# Patient Record
Sex: FEMALE | Race: WHITE | Employment: FULL TIME | URBAN - METROPOLITAN AREA
[De-identification: names, ages, dates, MRNs, and addresses within clinical notes are randomized per-mention and may not be internally consistent; named-entity substitution may affect disease eponyms.]

---

## 2017-01-12 RX ORDER — NEBIVOLOL 10 MG/1
10 TABLET ORAL DAILY
Qty: 90 TABLET | Refills: 1 | Status: SHIPPED | OUTPATIENT
Start: 2017-01-12 | End: 2017-05-10

## 2017-03-21 RX ORDER — AZITHROMYCIN 250 MG/1
TABLET, FILM COATED ORAL
Qty: 1 PACKET | Refills: 0 | Status: SHIPPED | OUTPATIENT
Start: 2017-03-21 | End: 2017-03-29

## 2017-03-24 ENCOUNTER — OFFICE VISIT (OUTPATIENT)
Dept: PRIMARY CARE CLINIC | Age: 62
End: 2017-03-24

## 2017-03-24 VITALS
OXYGEN SATURATION: 99 % | SYSTOLIC BLOOD PRESSURE: 110 MMHG | RESPIRATION RATE: 20 BRPM | TEMPERATURE: 100.4 F | HEIGHT: 65 IN | HEART RATE: 104 BPM | DIASTOLIC BLOOD PRESSURE: 70 MMHG

## 2017-03-24 DIAGNOSIS — M54.50 ACUTE BILATERAL LOW BACK PAIN WITHOUT SCIATICA: ICD-10-CM

## 2017-03-24 DIAGNOSIS — J02.9 SORE THROAT: ICD-10-CM

## 2017-03-24 DIAGNOSIS — R50.81 FEVER IN OTHER DISEASES: Primary | ICD-10-CM

## 2017-03-24 LAB
BILIRUBIN, POC: ABNORMAL
BLOOD URINE, POC: ABNORMAL
CLARITY, POC: CLEAR
COLOR, POC: YELLOW
GLUCOSE URINE, POC: ABNORMAL
INFLUENZA A ANTIBODY: ABNORMAL
INFLUENZA B ANTIBODY: ABNORMAL
KETONES, POC: ABNORMAL
LEUKOCYTE EST, POC: ABNORMAL
NITRITE, POC: ABNORMAL
PH, POC: 6
PROTEIN, POC: ABNORMAL
S PYO AG THROAT QL: NORMAL
SPECIFIC GRAVITY, POC: 1.02
UROBILINOGEN, POC: 3.5

## 2017-03-24 PROCEDURE — 3017F COLORECTAL CA SCREEN DOC REV: CPT | Performed by: FAMILY MEDICINE

## 2017-03-24 PROCEDURE — 87804 INFLUENZA ASSAY W/OPTIC: CPT | Performed by: FAMILY MEDICINE

## 2017-03-24 PROCEDURE — G8484 FLU IMMUNIZE NO ADMIN: HCPCS | Performed by: FAMILY MEDICINE

## 2017-03-24 PROCEDURE — 99213 OFFICE O/P EST LOW 20 MIN: CPT | Performed by: FAMILY MEDICINE

## 2017-03-24 PROCEDURE — 81003 URINALYSIS AUTO W/O SCOPE: CPT | Performed by: FAMILY MEDICINE

## 2017-03-24 PROCEDURE — 87880 STREP A ASSAY W/OPTIC: CPT | Performed by: FAMILY MEDICINE

## 2017-03-24 PROCEDURE — G8427 DOCREV CUR MEDS BY ELIG CLIN: HCPCS | Performed by: FAMILY MEDICINE

## 2017-03-24 PROCEDURE — 3014F SCREEN MAMMO DOC REV: CPT | Performed by: FAMILY MEDICINE

## 2017-03-24 PROCEDURE — G8420 CALC BMI NORM PARAMETERS: HCPCS | Performed by: FAMILY MEDICINE

## 2017-03-24 PROCEDURE — 1036F TOBACCO NON-USER: CPT | Performed by: FAMILY MEDICINE

## 2017-03-24 ASSESSMENT — ENCOUNTER SYMPTOMS
ABDOMINAL PAIN: 1
SHORTNESS OF BREATH: 0
CHEST TIGHTNESS: 1
BACK PAIN: 1
COUGH: 1
SORE THROAT: 1

## 2017-03-26 LAB — URINE CULTURE, ROUTINE: NORMAL

## 2017-03-29 ENCOUNTER — OFFICE VISIT (OUTPATIENT)
Dept: PRIMARY CARE CLINIC | Age: 62
End: 2017-03-29

## 2017-03-29 VITALS
HEART RATE: 59 BPM | SYSTOLIC BLOOD PRESSURE: 116 MMHG | WEIGHT: 119 LBS | HEIGHT: 69 IN | TEMPERATURE: 98.2 F | DIASTOLIC BLOOD PRESSURE: 80 MMHG | BODY MASS INDEX: 17.63 KG/M2 | RESPIRATION RATE: 16 BRPM | OXYGEN SATURATION: 98 %

## 2017-03-29 DIAGNOSIS — J10.1 INFLUENZA B: ICD-10-CM

## 2017-03-29 DIAGNOSIS — R05.9 COUGH: ICD-10-CM

## 2017-03-29 DIAGNOSIS — J40 BRONCHITIS: Primary | ICD-10-CM

## 2017-03-29 PROCEDURE — 96372 THER/PROPH/DIAG INJ SC/IM: CPT | Performed by: FAMILY MEDICINE

## 2017-03-29 PROCEDURE — G8427 DOCREV CUR MEDS BY ELIG CLIN: HCPCS | Performed by: FAMILY MEDICINE

## 2017-03-29 PROCEDURE — 3017F COLORECTAL CA SCREEN DOC REV: CPT | Performed by: FAMILY MEDICINE

## 2017-03-29 PROCEDURE — 3014F SCREEN MAMMO DOC REV: CPT | Performed by: FAMILY MEDICINE

## 2017-03-29 PROCEDURE — 99214 OFFICE O/P EST MOD 30 MIN: CPT | Performed by: FAMILY MEDICINE

## 2017-03-29 PROCEDURE — 1036F TOBACCO NON-USER: CPT | Performed by: FAMILY MEDICINE

## 2017-03-29 PROCEDURE — G8419 CALC BMI OUT NRM PARAM NOF/U: HCPCS | Performed by: FAMILY MEDICINE

## 2017-03-29 PROCEDURE — G8484 FLU IMMUNIZE NO ADMIN: HCPCS | Performed by: FAMILY MEDICINE

## 2017-03-29 RX ORDER — CEFTRIAXONE 1 G/1
1 INJECTION, POWDER, FOR SOLUTION INTRAMUSCULAR; INTRAVENOUS ONCE
Status: COMPLETED | OUTPATIENT
Start: 2017-03-29 | End: 2017-03-29

## 2017-03-29 RX ORDER — MOXIFLOXACIN HYDROCHLORIDE 400 MG/1
400 TABLET ORAL DAILY
Qty: 10 TABLET | Refills: 0 | Status: SHIPPED | OUTPATIENT
Start: 2017-03-29 | End: 2018-03-26 | Stop reason: SDUPTHER

## 2017-03-29 RX ADMIN — CEFTRIAXONE 1 G: 1 INJECTION, POWDER, FOR SOLUTION INTRAMUSCULAR; INTRAVENOUS at 11:40

## 2017-03-29 ASSESSMENT — ENCOUNTER SYMPTOMS
CONSTIPATION: 0
RHINORRHEA: 0
SHORTNESS OF BREATH: 0
WHEEZING: 0
HEMOPTYSIS: 0
HEARTBURN: 0
EYE DISCHARGE: 0
NAUSEA: 0
EYE REDNESS: 0
CHOKING: 0
FACIAL SWELLING: 0
ABDOMINAL PAIN: 0
PHOTOPHOBIA: 0
SORE THROAT: 1
COUGH: 1
STRIDOR: 0
DIARRHEA: 0
APNEA: 0
COLOR CHANGE: 0
CHEST TIGHTNESS: 0
EYE PAIN: 0

## 2017-04-03 ENCOUNTER — NURSE ONLY (OUTPATIENT)
Dept: PRIMARY CARE CLINIC | Age: 62
End: 2017-04-03

## 2017-04-03 DIAGNOSIS — J30.2 SEASONAL ALLERGIC RHINITIS, UNSPECIFIED ALLERGIC RHINITIS TRIGGER: Primary | ICD-10-CM

## 2017-04-03 PROCEDURE — 96372 THER/PROPH/DIAG INJ SC/IM: CPT | Performed by: FAMILY MEDICINE

## 2017-04-03 RX ORDER — TRIAMCINOLONE ACETONIDE 40 MG/ML
80 INJECTION, SUSPENSION INTRA-ARTICULAR; INTRAMUSCULAR ONCE
Status: COMPLETED | OUTPATIENT
Start: 2017-04-03 | End: 2017-04-03

## 2017-04-03 RX ADMIN — TRIAMCINOLONE ACETONIDE 80 MG: 40 INJECTION, SUSPENSION INTRA-ARTICULAR; INTRAMUSCULAR at 13:02

## 2017-04-05 ENCOUNTER — TELEPHONE (OUTPATIENT)
Dept: PRIMARY CARE CLINIC | Age: 62
End: 2017-04-05

## 2017-05-10 ENCOUNTER — OFFICE VISIT (OUTPATIENT)
Dept: PRIMARY CARE CLINIC | Age: 62
End: 2017-05-10

## 2017-05-10 VITALS
DIASTOLIC BLOOD PRESSURE: 80 MMHG | HEIGHT: 64 IN | OXYGEN SATURATION: 99 % | TEMPERATURE: 96.5 F | HEART RATE: 62 BPM | WEIGHT: 124.3 LBS | BODY MASS INDEX: 21.22 KG/M2 | SYSTOLIC BLOOD PRESSURE: 126 MMHG

## 2017-05-10 DIAGNOSIS — I10 ESSENTIAL HYPERTENSION: Primary | ICD-10-CM

## 2017-05-10 DIAGNOSIS — R05.9 COUGH: ICD-10-CM

## 2017-05-10 DIAGNOSIS — Z12.39 BREAST CANCER SCREENING: ICD-10-CM

## 2017-05-10 DIAGNOSIS — K58.9 IRRITABLE BOWEL SYNDROME, UNSPECIFIED TYPE: ICD-10-CM

## 2017-05-10 PROCEDURE — 3014F SCREEN MAMMO DOC REV: CPT | Performed by: FAMILY MEDICINE

## 2017-05-10 PROCEDURE — 1036F TOBACCO NON-USER: CPT | Performed by: FAMILY MEDICINE

## 2017-05-10 PROCEDURE — G8427 DOCREV CUR MEDS BY ELIG CLIN: HCPCS | Performed by: FAMILY MEDICINE

## 2017-05-10 PROCEDURE — 99214 OFFICE O/P EST MOD 30 MIN: CPT | Performed by: FAMILY MEDICINE

## 2017-05-10 PROCEDURE — 3017F COLORECTAL CA SCREEN DOC REV: CPT | Performed by: FAMILY MEDICINE

## 2017-05-10 PROCEDURE — G8420 CALC BMI NORM PARAMETERS: HCPCS | Performed by: FAMILY MEDICINE

## 2017-05-10 RX ORDER — ATENOLOL 25 MG/1
25 TABLET ORAL DAILY
Qty: 90 TABLET | Refills: 1 | Status: SHIPPED | OUTPATIENT
Start: 2017-05-10 | End: 2017-12-04 | Stop reason: SDUPTHER

## 2017-05-10 RX ORDER — PREDNISONE 10 MG/1
TABLET ORAL
Qty: 24 TABLET | Refills: 0 | Status: SHIPPED | OUTPATIENT
Start: 2017-05-10 | End: 2017-05-24

## 2017-05-10 ASSESSMENT — PATIENT HEALTH QUESTIONNAIRE - PHQ9
SUM OF ALL RESPONSES TO PHQ9 QUESTIONS 1 & 2: 0
1. LITTLE INTEREST OR PLEASURE IN DOING THINGS: 0
SUM OF ALL RESPONSES TO PHQ QUESTIONS 1-9: 0
2. FEELING DOWN, DEPRESSED OR HOPELESS: 0

## 2017-05-10 ASSESSMENT — ENCOUNTER SYMPTOMS
NAUSEA: 0
SINUS PRESSURE: 0
ORTHOPNEA: 0
COUGH: 1
ABDOMINAL PAIN: 0
SHORTNESS OF BREATH: 0
SORE THROAT: 0
DIARRHEA: 0
RHINORRHEA: 0
VOMITING: 0
BACK PAIN: 0
WHEEZING: 0
BLURRED VISION: 0
HEMOPTYSIS: 0
HEARTBURN: 0
CONSTIPATION: 0

## 2017-07-07 ENCOUNTER — NURSE ONLY (OUTPATIENT)
Dept: PRIMARY CARE CLINIC | Age: 62
End: 2017-07-07

## 2017-07-07 DIAGNOSIS — J30.9 ALLERGIC RHINITIS, UNSPECIFIED ALLERGIC RHINITIS TRIGGER, UNSPECIFIED RHINITIS SEASONALITY: Primary | ICD-10-CM

## 2017-07-07 PROCEDURE — 96372 THER/PROPH/DIAG INJ SC/IM: CPT | Performed by: FAMILY MEDICINE

## 2017-07-07 RX ORDER — TRIAMCINOLONE ACETONIDE 40 MG/ML
80 INJECTION, SUSPENSION INTRA-ARTICULAR; INTRAMUSCULAR ONCE
Status: COMPLETED | OUTPATIENT
Start: 2017-07-07 | End: 2017-07-07

## 2017-07-07 RX ADMIN — TRIAMCINOLONE ACETONIDE 80 MG: 40 INJECTION, SUSPENSION INTRA-ARTICULAR; INTRAMUSCULAR at 10:18

## 2017-08-17 ENCOUNTER — NURSE ONLY (OUTPATIENT)
Dept: PRIMARY CARE CLINIC | Age: 62
End: 2017-08-17

## 2017-08-17 VITALS — SYSTOLIC BLOOD PRESSURE: 142 MMHG | DIASTOLIC BLOOD PRESSURE: 94 MMHG

## 2017-08-17 DIAGNOSIS — I10 ESSENTIAL HYPERTENSION: Primary | ICD-10-CM

## 2017-09-27 DIAGNOSIS — F41.1 GAD (GENERALIZED ANXIETY DISORDER): ICD-10-CM

## 2017-09-27 RX ORDER — ALPRAZOLAM 0.25 MG/1
0.25 TABLET ORAL 3 TIMES DAILY PRN
Qty: 30 TABLET | Refills: 0 | Status: SHIPPED | OUTPATIENT
Start: 2017-09-27 | End: 2017-11-13

## 2017-10-20 ENCOUNTER — NURSE ONLY (OUTPATIENT)
Dept: PRIMARY CARE CLINIC | Age: 62
End: 2017-10-20

## 2017-10-20 DIAGNOSIS — J30.9 ALLERGIC RHINITIS, UNSPECIFIED CHRONICITY, UNSPECIFIED SEASONALITY, UNSPECIFIED TRIGGER: Primary | ICD-10-CM

## 2017-10-20 PROCEDURE — 96372 THER/PROPH/DIAG INJ SC/IM: CPT | Performed by: FAMILY MEDICINE

## 2017-10-20 RX ORDER — TRIAMCINOLONE ACETONIDE 40 MG/ML
80 INJECTION, SUSPENSION INTRA-ARTICULAR; INTRAMUSCULAR ONCE
Status: COMPLETED | OUTPATIENT
Start: 2017-10-20 | End: 2017-10-20

## 2017-10-20 RX ADMIN — TRIAMCINOLONE ACETONIDE 80 MG: 40 INJECTION, SUSPENSION INTRA-ARTICULAR; INTRAMUSCULAR at 09:03

## 2017-10-26 ENCOUNTER — TELEPHONE (OUTPATIENT)
Dept: PRIMARY CARE CLINIC | Age: 62
End: 2017-10-26

## 2017-10-26 NOTE — TELEPHONE ENCOUNTER
Pt requesting referral for urologist.   Pt has history of kidney stones. She had a CT done and it showed a lot of kidney problems. PATIENT:  [phone]    Please call patient when ready.

## 2017-11-07 ENCOUNTER — OFFICE VISIT (OUTPATIENT)
Dept: PRIMARY CARE CLINIC | Age: 62
End: 2017-11-07

## 2017-11-07 VITALS
HEART RATE: 64 BPM | SYSTOLIC BLOOD PRESSURE: 120 MMHG | BODY MASS INDEX: 21.34 KG/M2 | WEIGHT: 125 LBS | HEIGHT: 64 IN | TEMPERATURE: 97.1 F | RESPIRATION RATE: 16 BRPM | OXYGEN SATURATION: 97 % | DIASTOLIC BLOOD PRESSURE: 84 MMHG

## 2017-11-07 DIAGNOSIS — I10 ESSENTIAL HYPERTENSION: Primary | ICD-10-CM

## 2017-11-07 DIAGNOSIS — J01.00 SUBACUTE MAXILLARY SINUSITIS: ICD-10-CM

## 2017-11-07 PROCEDURE — 3017F COLORECTAL CA SCREEN DOC REV: CPT | Performed by: FAMILY MEDICINE

## 2017-11-07 PROCEDURE — 99213 OFFICE O/P EST LOW 20 MIN: CPT | Performed by: FAMILY MEDICINE

## 2017-11-07 PROCEDURE — 96372 THER/PROPH/DIAG INJ SC/IM: CPT | Performed by: FAMILY MEDICINE

## 2017-11-07 PROCEDURE — G8420 CALC BMI NORM PARAMETERS: HCPCS | Performed by: FAMILY MEDICINE

## 2017-11-07 PROCEDURE — G8484 FLU IMMUNIZE NO ADMIN: HCPCS | Performed by: FAMILY MEDICINE

## 2017-11-07 PROCEDURE — G8427 DOCREV CUR MEDS BY ELIG CLIN: HCPCS | Performed by: FAMILY MEDICINE

## 2017-11-07 PROCEDURE — 1036F TOBACCO NON-USER: CPT | Performed by: FAMILY MEDICINE

## 2017-11-07 PROCEDURE — 3014F SCREEN MAMMO DOC REV: CPT | Performed by: FAMILY MEDICINE

## 2017-11-07 RX ORDER — CEFTRIAXONE 1 G/1
1 INJECTION, POWDER, FOR SOLUTION INTRAMUSCULAR; INTRAVENOUS ONCE
Status: COMPLETED | OUTPATIENT
Start: 2017-11-07 | End: 2017-11-07

## 2017-11-07 RX ORDER — AZITHROMYCIN 250 MG/1
TABLET, FILM COATED ORAL
Qty: 6 TABLET | Refills: 0 | Status: SHIPPED | OUTPATIENT
Start: 2017-11-07 | End: 2018-01-07

## 2017-11-07 RX ADMIN — CEFTRIAXONE 1 G: 1 INJECTION, POWDER, FOR SOLUTION INTRAMUSCULAR; INTRAVENOUS at 16:00

## 2017-11-07 ASSESSMENT — ENCOUNTER SYMPTOMS
WHEEZING: 0
SINUS PRESSURE: 1
VOMITING: 0
SORE THROAT: 1
CONSTIPATION: 0
DIARRHEA: 0
SHORTNESS OF BREATH: 0
COUGH: 1
BACK PAIN: 0
ABDOMINAL PAIN: 0
NAUSEA: 0
SINUS PAIN: 1

## 2017-11-07 NOTE — PROGRESS NOTES
01/10/2017    DR. FAUSTIN    HYSTERECTOMY, TOTAL ABDOMINAL  10/08    [facility]    THUMB AMPUTATION  11/2007    joint replacement    TOE FUSION  4/2002    TOTAL HIP ARTHROPLASTY Left 10/6/2016    HIP TOTAL ARTHROPLASTY, LEFT, YOUNG MDM, ACCOLADE 2, TXA-IV  performed by Kinsey Massey MD at [facility] OR     Family History   Problem Relation Age of Onset    Coronary Art Dis Father      Social History     Social History    Marital status:      Spouse name: N/A    Number of children: N/A    Years of education: N/A     Occupational History    Not on file. Social History Main Topics    Smoking status: Former Smoker     Quit date: 1/1/1996    Smokeless tobacco: Never Used    Alcohol use Yes      Comment: rare    Drug use: No    Sexual activity: Not on file     Other Topics Concern    Not on file     Social History Narrative    No narrative on file     Allergies:  Levaquin [levofloxacin in d5w]    Review of Systems   Constitutional: Negative for chills, fatigue and fever. HENT: Positive for congestion, postnasal drip, sinus pain, sinus pressure and sore throat. Negative for ear pain. Respiratory: Positive for cough. Negative for shortness of breath and wheezing. Cardiovascular: Negative for chest pain and palpitations. Gastrointestinal: Negative for abdominal pain, constipation, diarrhea, nausea and vomiting. Musculoskeletal: Negative for back pain and neck pain. Neurological: Positive for headaches. Negative for dizziness. Objective:   /84 (Site: Right Arm, Position: Sitting, Cuff Size: Medium Adult)   Pulse 64   Temp 97.1 °F (36.2 °C) (Tympanic)   Resp 16   Ht 5' 4\" (1.626 m)   Wt 125 lb (56.7 kg)   SpO2 97%   Breastfeeding? No   BMI 21.46 kg/m²     Physical Exam   Constitutional: She is oriented to person, place, and time. She appears well-developed and well-nourished. HENT:   Head: Normocephalic and atraumatic.    Nose: Mucosal edema, rhinorrhea, nose

## 2017-11-13 ENCOUNTER — TELEPHONE (OUTPATIENT)
Dept: PRIMARY CARE CLINIC | Age: 62
End: 2017-11-13

## 2017-11-13 RX ORDER — GUAIFENESIN 600 MG/1
600 TABLET, EXTENDED RELEASE ORAL 2 TIMES DAILY
Qty: 60 TABLET | Refills: 0 | Status: SHIPPED | OUTPATIENT
Start: 2017-11-13 | End: 2018-04-12

## 2017-11-13 RX ORDER — ALPRAZOLAM 0.5 MG/1
0.5 TABLET ORAL 3 TIMES DAILY
Qty: 90 TABLET | Refills: 0 | Status: SHIPPED | OUTPATIENT
Start: 2017-11-13 | End: 2017-12-13

## 2017-11-13 RX ORDER — FEXOFENADINE HCL 180 MG/1
180 TABLET ORAL DAILY
Qty: 30 TABLET | Refills: 0 | Status: SHIPPED | OUTPATIENT
Start: 2017-11-13 | End: 2018-03-16

## 2017-12-04 DIAGNOSIS — I10 ESSENTIAL HYPERTENSION: ICD-10-CM

## 2017-12-04 RX ORDER — ATENOLOL 25 MG/1
TABLET ORAL
Qty: 90 TABLET | Refills: 1 | Status: SHIPPED | OUTPATIENT
Start: 2017-12-04 | End: 2018-05-29 | Stop reason: SDUPTHER

## 2018-01-24 ENCOUNTER — NURSE ONLY (OUTPATIENT)
Dept: PRIMARY CARE CLINIC | Age: 63
End: 2018-01-24
Payer: COMMERCIAL

## 2018-01-24 DIAGNOSIS — J30.2 CHRONIC SEASONAL ALLERGIC RHINITIS, UNSPECIFIED TRIGGER: Primary | ICD-10-CM

## 2018-01-24 PROCEDURE — 96372 THER/PROPH/DIAG INJ SC/IM: CPT | Performed by: FAMILY MEDICINE

## 2018-01-24 RX ORDER — TRIAMCINOLONE ACETONIDE 40 MG/ML
80 INJECTION, SUSPENSION INTRA-ARTICULAR; INTRAMUSCULAR ONCE
Status: COMPLETED | OUTPATIENT
Start: 2018-01-24 | End: 2018-01-24

## 2018-01-24 RX ADMIN — TRIAMCINOLONE ACETONIDE 80 MG: 40 INJECTION, SUSPENSION INTRA-ARTICULAR; INTRAMUSCULAR at 10:40

## 2018-01-26 ENCOUNTER — TELEPHONE (OUTPATIENT)
Dept: PRIMARY CARE CLINIC | Age: 63
End: 2018-01-26

## 2018-01-26 RX ORDER — NEBIVOLOL 10 MG/1
TABLET ORAL
Qty: 90 TABLET | Refills: 1 | Status: SHIPPED | OUTPATIENT
Start: 2018-01-26 | End: 2018-02-26

## 2018-01-30 ENCOUNTER — TELEPHONE (OUTPATIENT)
Dept: PRIMARY CARE CLINIC | Age: 63
End: 2018-01-30

## 2018-02-06 RX ORDER — ALPRAZOLAM 0.5 MG/1
0.5 TABLET ORAL 3 TIMES DAILY PRN
Qty: 90 TABLET | Refills: 0 | Status: SHIPPED | OUTPATIENT
Start: 2018-02-06 | End: 2018-03-08

## 2018-02-21 ENCOUNTER — OFFICE VISIT (OUTPATIENT)
Dept: PRIMARY CARE CLINIC | Age: 63
End: 2018-02-21
Payer: COMMERCIAL

## 2018-02-21 VITALS
HEIGHT: 64 IN | TEMPERATURE: 96.7 F | BODY MASS INDEX: 20.83 KG/M2 | RESPIRATION RATE: 16 BRPM | WEIGHT: 122 LBS | DIASTOLIC BLOOD PRESSURE: 82 MMHG | OXYGEN SATURATION: 98 % | SYSTOLIC BLOOD PRESSURE: 126 MMHG | HEART RATE: 61 BPM

## 2018-02-21 DIAGNOSIS — J34.89 SINUS PRESSURE: ICD-10-CM

## 2018-02-21 DIAGNOSIS — H61.23 BILATERAL IMPACTED CERUMEN: Primary | ICD-10-CM

## 2018-02-21 PROCEDURE — 99213 OFFICE O/P EST LOW 20 MIN: CPT | Performed by: NURSE PRACTITIONER

## 2018-02-21 PROCEDURE — G8420 CALC BMI NORM PARAMETERS: HCPCS | Performed by: NURSE PRACTITIONER

## 2018-02-21 PROCEDURE — 69210 REMOVE IMPACTED EAR WAX UNI: CPT | Performed by: NURSE PRACTITIONER

## 2018-02-21 PROCEDURE — G8427 DOCREV CUR MEDS BY ELIG CLIN: HCPCS | Performed by: NURSE PRACTITIONER

## 2018-02-21 PROCEDURE — G8484 FLU IMMUNIZE NO ADMIN: HCPCS | Performed by: NURSE PRACTITIONER

## 2018-02-21 PROCEDURE — 3017F COLORECTAL CA SCREEN DOC REV: CPT | Performed by: NURSE PRACTITIONER

## 2018-02-21 PROCEDURE — 1036F TOBACCO NON-USER: CPT | Performed by: NURSE PRACTITIONER

## 2018-02-21 PROCEDURE — 3014F SCREEN MAMMO DOC REV: CPT | Performed by: NURSE PRACTITIONER

## 2018-02-21 RX ORDER — CEFDINIR 300 MG/1
600 CAPSULE ORAL DAILY
Qty: 20 CAPSULE | Refills: 0 | Status: SHIPPED | OUTPATIENT
Start: 2018-02-21 | End: 2018-03-16

## 2018-02-21 ASSESSMENT — ENCOUNTER SYMPTOMS
SORE THROAT: 0
SINUS PRESSURE: 1
SINUS PAIN: 1
COUGH: 1
SHORTNESS OF BREATH: 0
WHEEZING: 0
RHINORRHEA: 1

## 2018-02-21 NOTE — PROGRESS NOTES
Subjective:      Patient ID: Haylie Agustin is a 58 y.o. female who presents today for:  Chief Complaint   Patient presents with    Sinusitis     pt has c.o sinus pressure, drianage, headache, cough and congestion. Sinusitis   This is a new problem. The current episode started in the past 7 days. The problem is unchanged. There has been no fever. Associated symptoms include congestion, coughing, ear pain (on left), headaches and sinus pressure (on left). Pertinent negatives include no chills, shortness of breath or sore throat. Past treatments include nothing. Past Medical History:   Diagnosis Date    Allergic Conjunctivitis 5/15/2013    Allergic rhinitis 5/15/2013    Anxiety 10/12/2015    Anxiety, not on Paxil, Lexapro 1/12/2016    Breast cyst 6/2006    aspirated    Carotid stenosis 8/2008    Cervical arthritis ([address] 2/20/2014    Chronic sinusitis 5/15/2013    Colitis     collagenus    Decreased libido 2/20/2014    Depression 1/5/2016    Family discord,  1/5/2016    Family discord,  10/12/2015    Fatigue 2/20/2014    ADILENE (generalized anxiety disorder)     HA (headache) 8/6/2015    Health maintenance examination 4/21/2015    HTN (hypertension)     HTN (hypertension), controlled w/ wt loss 6/9/2014    IBS (irritable bowel syndrome)     Infiltrate noted on imaging study 5/2/2016    Neck pain on left side 2/20/2014    Nocturnal headaches 8/6/2015    Primary insomnia 1/5/2016    Rosacea     S/P hysterectomy 4/21/2015    S/P total hysterectomy 2/20/2014    Sinusitis 8/6/2015    Social isolation 1/5/2016    Stress, daughter 8/6/2015    Uterine fibroid 10/2008     Past Surgical History:   Procedure Laterality Date    BACK SURGERY      BREAST BIOPSY  10/2009    COLONOSCOPY  01/10/2017    DR. FAUSTIN    HYSTERECTOMY, TOTAL ABDOMINAL  10/08    [facility]    THUMB AMPUTATION  11/2007    joint replacement    TOE FUSION  4/2002    TOTAL HIP ARTHROPLASTY Left 10/6/2016 [levofloxacin in d5w]    Review of Systems   Constitutional: Negative for chills, fatigue and fever. HENT: Positive for congestion, ear pain (on left), postnasal drip, rhinorrhea, sinus pain and sinus pressure (on left). Negative for ear discharge and sore throat. Respiratory: Positive for cough. Negative for shortness of breath and wheezing. Musculoskeletal: Negative for myalgias. Neurological: Positive for headaches. Objective:   /82   Pulse 61   Temp 96.7 °F (35.9 °C) (Tympanic)   Resp 16   Ht 5' 4\" (1.626 m)   Wt 122 lb (55.3 kg)   SpO2 98%   BMI 20.94 kg/m²     Physical Exam   Constitutional: She is oriented to person, place, and time. She appears well-developed and well-nourished. HENT:   Head: Normocephalic. Right Ear: External ear normal.   Left Ear: External ear normal.   Ears:    Nose: Rhinorrhea present. Mouth/Throat: Uvula is midline, oropharynx is clear and moist and mucous membranes are normal.   Eyes: Conjunctivae are normal. Right eye exhibits no discharge. Left eye exhibits no discharge. Neck: Normal range of motion. Cardiovascular: Normal rate, regular rhythm and normal heart sounds. Pulmonary/Chest: Effort normal and breath sounds normal. No accessory muscle usage. No respiratory distress. She has no decreased breath sounds. She has no wheezes. She has no rhonchi. She has no rales. Lymphadenopathy:     She has no cervical adenopathy. Neurological: She is alert and oriented to person, place, and time. Skin: Skin is warm and dry. Psychiatric: She has a normal mood and affect. Her behavior is normal.       Assessment:      1. Bilateral impacted cerumen  OR REMOVAL IMPACTED CERUMEN INSTRUMENTATION UNILAT    carbamide peroxide (DEBROX) 6.5 % otic solution   2.  Sinus pressure  cefdinir (OMNICEF) 300 MG capsule       Plan:      Orders Placed This Encounter   Procedures    OR REMOVAL IMPACTED CERUMEN INSTRUMENTATION UNILAT     Ear wax was removed with a

## 2018-02-21 NOTE — PATIENT INSTRUCTIONS
loss of hearing. ? Watch closely for changes in your health, and be sure to contact your doctor if:  ? · You have pain or reduced hearing after 1 week of home treatment. ? · You have any new symptoms, such as nausea or balance problems. Where can you learn more? Go to https://chpepiceweb.[employer]. org and sign in to your [employer] account. Enter S389 in the [employer] box to learn more about \"Earwax Blockage: Care Instructions. \"     If you do not have an account, please click on the \"Sign Up Now\" link. Current as of: March 20, 2017  Content Version: 11.5  © 7697-1793 Healthwise, [employer]. Care instructions adapted under license by [facility] ([facility]). If you have questions about a medical condition or this instruction, always ask your healthcare professional. [address] any warranty or liability for your use of this information.

## 2018-02-26 ENCOUNTER — OFFICE VISIT (OUTPATIENT)
Dept: PRIMARY CARE CLINIC | Age: 63
End: 2018-02-26
Payer: COMMERCIAL

## 2018-02-26 VITALS
RESPIRATION RATE: 16 BRPM | OXYGEN SATURATION: 99 % | DIASTOLIC BLOOD PRESSURE: 88 MMHG | SYSTOLIC BLOOD PRESSURE: 120 MMHG | HEIGHT: 64 IN | BODY MASS INDEX: 20.83 KG/M2 | TEMPERATURE: 97.4 F | HEART RATE: 64 BPM | WEIGHT: 122 LBS

## 2018-02-26 DIAGNOSIS — J30.1 ACUTE SEASONAL ALLERGIC RHINITIS DUE TO POLLEN: ICD-10-CM

## 2018-02-26 DIAGNOSIS — I10 ESSENTIAL HYPERTENSION: ICD-10-CM

## 2018-02-26 DIAGNOSIS — H61.22 HEARING LOSS OF LEFT EAR DUE TO CERUMEN IMPACTION: Primary | ICD-10-CM

## 2018-02-26 PROCEDURE — 1036F TOBACCO NON-USER: CPT | Performed by: FAMILY MEDICINE

## 2018-02-26 PROCEDURE — G8420 CALC BMI NORM PARAMETERS: HCPCS | Performed by: FAMILY MEDICINE

## 2018-02-26 PROCEDURE — 3014F SCREEN MAMMO DOC REV: CPT | Performed by: FAMILY MEDICINE

## 2018-02-26 PROCEDURE — 99213 OFFICE O/P EST LOW 20 MIN: CPT | Performed by: FAMILY MEDICINE

## 2018-02-26 PROCEDURE — G8484 FLU IMMUNIZE NO ADMIN: HCPCS | Performed by: FAMILY MEDICINE

## 2018-02-26 PROCEDURE — 3017F COLORECTAL CA SCREEN DOC REV: CPT | Performed by: FAMILY MEDICINE

## 2018-02-26 PROCEDURE — G8427 DOCREV CUR MEDS BY ELIG CLIN: HCPCS | Performed by: FAMILY MEDICINE

## 2018-02-26 NOTE — PROGRESS NOTES
0.005 % ophthalmic solution Use 1 Drop in both eyes daily at bedtime.  Multiple Vitamins-Minerals (WOMENS ONE DAILY) TABS Take  by mouth. Current Facility-Administered Medications on File Prior to Visit   Medication Dose Route Frequency Provider Last Rate Last Dose    cloNIDine (CATAPRES) tablet 0.1 mg  0.1 mg Oral Once Kriss Lopez CNP           Review of Systems   HENT: Positive for ear discharge, hearing loss and sinus pressure. Negative for ear pain, postnasal drip, rhinorrhea, sinus pain, sore throat, tinnitus and voice change. Respiratory: Negative for chest tightness and shortness of breath. Cardiovascular: Negative for chest pain, palpitations and leg swelling. Objective    Vitals:    02/26/18 1733   BP: 120/88   Site: Left Arm   Position: Sitting   Cuff Size: Small Adult   Pulse: 64   Resp: 16   Temp: 97.4 °F (36.3 °C)   TempSrc: Tympanic   SpO2: 99%   Weight: 122 lb (55.3 kg)   Height: 5' 4\" (1.626 m)     Physical Exam   Constitutional: She is oriented to person, place, and time. She appears well-developed and well-nourished. HENT:   Head: Normocephalic and atraumatic. Right Ear: No foreign bodies. No middle ear effusion. No decreased hearing is noted. Left Ear: A foreign body is present. A middle ear effusion is present. Decreased hearing is noted. Eyes: Conjunctivae and EOM are normal. Pupils are equal, round, and reactive to light. Neck: Normal range of motion. Neck supple. No thyromegaly present. Cardiovascular: Normal rate, regular rhythm and normal heart sounds. No murmur heard. Pulmonary/Chest: Effort normal and breath sounds normal. She has no wheezes. She exhibits no tenderness. Abdominal: Soft. Bowel sounds are normal. There is no tenderness. Musculoskeletal: Normal range of motion. She exhibits no edema or tenderness. Lymphadenopathy:     She has no cervical adenopathy. Neurological: She is alert and oriented to person, place, and time.  She has

## 2018-03-10 ASSESSMENT — ENCOUNTER SYMPTOMS
SINUS PRESSURE: 1
SORE THROAT: 0
CHEST TIGHTNESS: 0
SHORTNESS OF BREATH: 0
SINUS PAIN: 0
VOICE CHANGE: 0
RHINORRHEA: 0

## 2018-03-12 ENCOUNTER — TELEPHONE (OUTPATIENT)
Dept: PRIMARY CARE CLINIC | Age: 63
End: 2018-03-12

## 2018-03-12 RX ORDER — CEFDINIR 300 MG/1
600 CAPSULE ORAL DAILY
Qty: 20 CAPSULE | Refills: 0 | Status: SHIPPED | OUTPATIENT
Start: 2018-03-12 | End: 2018-03-16

## 2018-03-14 ENCOUNTER — TELEPHONE (OUTPATIENT)
Dept: PRIMARY CARE CLINIC | Age: 63
End: 2018-03-14

## 2018-03-16 ENCOUNTER — OFFICE VISIT (OUTPATIENT)
Dept: PRIMARY CARE CLINIC | Age: 63
End: 2018-03-16
Payer: COMMERCIAL

## 2018-03-16 VITALS
OXYGEN SATURATION: 95 % | TEMPERATURE: 96.2 F | HEIGHT: 64 IN | WEIGHT: 120 LBS | HEART RATE: 66 BPM | DIASTOLIC BLOOD PRESSURE: 98 MMHG | BODY MASS INDEX: 20.49 KG/M2 | RESPIRATION RATE: 16 BRPM | SYSTOLIC BLOOD PRESSURE: 172 MMHG

## 2018-03-16 DIAGNOSIS — I10 ESSENTIAL HYPERTENSION: Primary | ICD-10-CM

## 2018-03-16 DIAGNOSIS — F41.1 GAD (GENERALIZED ANXIETY DISORDER): ICD-10-CM

## 2018-03-16 PROCEDURE — 93000 ELECTROCARDIOGRAM COMPLETE: CPT | Performed by: FAMILY MEDICINE

## 2018-03-16 PROCEDURE — 3017F COLORECTAL CA SCREEN DOC REV: CPT | Performed by: FAMILY MEDICINE

## 2018-03-16 PROCEDURE — G8427 DOCREV CUR MEDS BY ELIG CLIN: HCPCS | Performed by: FAMILY MEDICINE

## 2018-03-16 PROCEDURE — 3014F SCREEN MAMMO DOC REV: CPT | Performed by: FAMILY MEDICINE

## 2018-03-16 PROCEDURE — G8420 CALC BMI NORM PARAMETERS: HCPCS | Performed by: FAMILY MEDICINE

## 2018-03-16 PROCEDURE — 99214 OFFICE O/P EST MOD 30 MIN: CPT | Performed by: FAMILY MEDICINE

## 2018-03-16 PROCEDURE — G8484 FLU IMMUNIZE NO ADMIN: HCPCS | Performed by: FAMILY MEDICINE

## 2018-03-16 PROCEDURE — 1036F TOBACCO NON-USER: CPT | Performed by: FAMILY MEDICINE

## 2018-03-16 RX ORDER — ALPRAZOLAM 0.25 MG/1
0.25 TABLET ORAL 3 TIMES DAILY PRN
Qty: 40 TABLET | Refills: 0 | Status: SHIPPED | OUTPATIENT
Start: 2018-03-16 | End: 2018-04-12 | Stop reason: SDUPTHER

## 2018-03-16 RX ORDER — AMLODIPINE BESYLATE 5 MG/1
5 TABLET ORAL DAILY
Qty: 30 TABLET | Refills: 2 | Status: SHIPPED | OUTPATIENT
Start: 2018-03-16 | End: 2018-05-15

## 2018-03-16 ASSESSMENT — ENCOUNTER SYMPTOMS
WHEEZING: 0
BLURRED VISION: 0
STRIDOR: 0
EYE DISCHARGE: 0
ABDOMINAL PAIN: 0
DIARRHEA: 1
EYE PAIN: 0
CONSTIPATION: 0
FACIAL SWELLING: 0
EYE REDNESS: 0
ORTHOPNEA: 0
NAUSEA: 0
SHORTNESS OF BREATH: 1
COLOR CHANGE: 0
PHOTOPHOBIA: 0
CHOKING: 0
APNEA: 0
CHEST TIGHTNESS: 1

## 2018-03-22 ENCOUNTER — TELEPHONE (OUTPATIENT)
Dept: PRIMARY CARE CLINIC | Age: 63
End: 2018-03-22

## 2018-03-22 ENCOUNTER — OFFICE VISIT (OUTPATIENT)
Dept: PRIMARY CARE CLINIC | Age: 63
End: 2018-03-22
Payer: COMMERCIAL

## 2018-03-22 VITALS
HEIGHT: 64 IN | WEIGHT: 120 LBS | RESPIRATION RATE: 16 BRPM | DIASTOLIC BLOOD PRESSURE: 82 MMHG | TEMPERATURE: 97.2 F | OXYGEN SATURATION: 95 % | SYSTOLIC BLOOD PRESSURE: 130 MMHG | BODY MASS INDEX: 20.49 KG/M2 | HEART RATE: 64 BPM

## 2018-03-22 DIAGNOSIS — F41.1 GAD (GENERALIZED ANXIETY DISORDER): ICD-10-CM

## 2018-03-22 DIAGNOSIS — I10 ESSENTIAL HYPERTENSION: Primary | ICD-10-CM

## 2018-03-22 DIAGNOSIS — K58.9 IRRITABLE BOWEL SYNDROME, UNSPECIFIED TYPE: ICD-10-CM

## 2018-03-22 PROCEDURE — 3017F COLORECTAL CA SCREEN DOC REV: CPT | Performed by: FAMILY MEDICINE

## 2018-03-22 PROCEDURE — G8484 FLU IMMUNIZE NO ADMIN: HCPCS | Performed by: FAMILY MEDICINE

## 2018-03-22 PROCEDURE — 3014F SCREEN MAMMO DOC REV: CPT | Performed by: FAMILY MEDICINE

## 2018-03-22 PROCEDURE — 1036F TOBACCO NON-USER: CPT | Performed by: FAMILY MEDICINE

## 2018-03-22 PROCEDURE — G8420 CALC BMI NORM PARAMETERS: HCPCS | Performed by: FAMILY MEDICINE

## 2018-03-22 PROCEDURE — 99213 OFFICE O/P EST LOW 20 MIN: CPT | Performed by: FAMILY MEDICINE

## 2018-03-22 PROCEDURE — G8427 DOCREV CUR MEDS BY ELIG CLIN: HCPCS | Performed by: FAMILY MEDICINE

## 2018-03-22 RX ORDER — LOSARTAN POTASSIUM 100 MG/1
TABLET ORAL
Qty: 30 TABLET | Refills: 2 | Status: SHIPPED | OUTPATIENT
Start: 2018-03-22 | End: 2018-04-12 | Stop reason: SDUPTHER

## 2018-03-22 ASSESSMENT — ENCOUNTER SYMPTOMS
NAUSEA: 0
EYE REDNESS: 0
EYE PAIN: 0
CHEST TIGHTNESS: 0
CONSTIPATION: 0
PHOTOPHOBIA: 0
APNEA: 0
FACIAL SWELLING: 0
ABDOMINAL PAIN: 0
COLOR CHANGE: 0
CHOKING: 0
DIARRHEA: 0
STRIDOR: 0
EYE DISCHARGE: 0
WHEEZING: 0

## 2018-03-22 NOTE — PROGRESS NOTES
documentation, as scribed by Juan Carlos Portillo in my presence, and it is both accurate and complete.  Electronically signed by: Kristin Livingston DO    3/25/18 6:09 PM    Kristin Livingston DO

## 2018-03-26 ENCOUNTER — TELEPHONE (OUTPATIENT)
Dept: PRIMARY CARE CLINIC | Age: 63
End: 2018-03-26

## 2018-03-26 DIAGNOSIS — J40 BRONCHITIS: ICD-10-CM

## 2018-03-26 DIAGNOSIS — R05.9 COUGH: ICD-10-CM

## 2018-03-26 RX ORDER — MOXIFLOXACIN HYDROCHLORIDE 400 MG/1
400 TABLET ORAL DAILY
Qty: 10 TABLET | Refills: 0 | Status: SHIPPED | OUTPATIENT
Start: 2018-03-26 | End: 2018-04-30 | Stop reason: SDUPTHER

## 2018-04-12 ENCOUNTER — OFFICE VISIT (OUTPATIENT)
Dept: PRIMARY CARE CLINIC | Age: 63
End: 2018-04-12
Payer: COMMERCIAL

## 2018-04-12 VITALS
RESPIRATION RATE: 12 BRPM | OXYGEN SATURATION: 99 % | SYSTOLIC BLOOD PRESSURE: 108 MMHG | HEIGHT: 64 IN | HEART RATE: 70 BPM | TEMPERATURE: 97 F | WEIGHT: 119.7 LBS | DIASTOLIC BLOOD PRESSURE: 72 MMHG | BODY MASS INDEX: 20.43 KG/M2

## 2018-04-12 DIAGNOSIS — I10 ESSENTIAL HYPERTENSION: ICD-10-CM

## 2018-04-12 DIAGNOSIS — F41.1 GAD (GENERALIZED ANXIETY DISORDER): ICD-10-CM

## 2018-04-12 PROCEDURE — G8420 CALC BMI NORM PARAMETERS: HCPCS | Performed by: FAMILY MEDICINE

## 2018-04-12 PROCEDURE — 1036F TOBACCO NON-USER: CPT | Performed by: FAMILY MEDICINE

## 2018-04-12 PROCEDURE — 3014F SCREEN MAMMO DOC REV: CPT | Performed by: FAMILY MEDICINE

## 2018-04-12 PROCEDURE — 3017F COLORECTAL CA SCREEN DOC REV: CPT | Performed by: FAMILY MEDICINE

## 2018-04-12 PROCEDURE — 99213 OFFICE O/P EST LOW 20 MIN: CPT | Performed by: FAMILY MEDICINE

## 2018-04-12 PROCEDURE — G8427 DOCREV CUR MEDS BY ELIG CLIN: HCPCS | Performed by: FAMILY MEDICINE

## 2018-04-12 RX ORDER — ALPRAZOLAM 0.25 MG/1
0.25 TABLET ORAL 3 TIMES DAILY PRN
Qty: 60 TABLET | Refills: 0 | Status: SHIPPED | OUTPATIENT
Start: 2018-04-12 | End: 2018-05-15 | Stop reason: SDUPTHER

## 2018-04-12 RX ORDER — AMLODIPINE BESYLATE 5 MG/1
5 TABLET ORAL DAILY
Qty: 30 TABLET | Refills: 0 | Status: CANCELLED | OUTPATIENT
Start: 2018-04-12

## 2018-04-12 RX ORDER — LOSARTAN POTASSIUM 100 MG/1
TABLET ORAL
Qty: 90 TABLET | Refills: 0 | Status: SHIPPED | OUTPATIENT
Start: 2018-04-12 | End: 2018-05-15 | Stop reason: SDUPTHER

## 2018-04-12 RX ORDER — ATENOLOL 25 MG/1
TABLET ORAL
Qty: 90 TABLET | Refills: 1 | Status: CANCELLED | OUTPATIENT
Start: 2018-04-12

## 2018-04-12 RX ORDER — AMLODIPINE BESYLATE 5 MG/1
5 TABLET ORAL DAILY
Qty: 90 TABLET | Refills: 0 | Status: CANCELLED | OUTPATIENT
Start: 2018-04-12

## 2018-04-12 ASSESSMENT — ENCOUNTER SYMPTOMS
PHOTOPHOBIA: 0
WHEEZING: 0
FACIAL SWELLING: 0
EYE PAIN: 0
APNEA: 0
NAUSEA: 0
STRIDOR: 0
DIARRHEA: 0
EYE REDNESS: 0
COLOR CHANGE: 0
ABDOMINAL PAIN: 0
CHEST TIGHTNESS: 0
BLURRED VISION: 0
CONSTIPATION: 0
ORTHOPNEA: 0
EYE DISCHARGE: 0
SHORTNESS OF BREATH: 0
CHOKING: 0

## 2018-04-30 DIAGNOSIS — R05.9 COUGH: ICD-10-CM

## 2018-04-30 DIAGNOSIS — J40 BRONCHITIS: ICD-10-CM

## 2018-04-30 RX ORDER — MOXIFLOXACIN HYDROCHLORIDE 400 MG/1
400 TABLET ORAL DAILY
Qty: 10 TABLET | Refills: 0 | Status: SHIPPED | OUTPATIENT
Start: 2018-04-30 | End: 2018-05-15

## 2018-04-30 RX ORDER — AZELASTINE HYDROCHLORIDE 137 UG/1
1 SPRAY, METERED NASAL 2 TIMES DAILY
Qty: 1 BOTTLE | Refills: 5 | Status: SHIPPED | OUTPATIENT
Start: 2018-04-30 | End: 2019-02-16

## 2018-05-15 ENCOUNTER — OFFICE VISIT (OUTPATIENT)
Dept: PRIMARY CARE CLINIC | Age: 63
End: 2018-05-15
Payer: COMMERCIAL

## 2018-05-15 VITALS
WEIGHT: 121 LBS | DIASTOLIC BLOOD PRESSURE: 86 MMHG | TEMPERATURE: 97 F | BODY MASS INDEX: 20.66 KG/M2 | RESPIRATION RATE: 14 BRPM | HEART RATE: 56 BPM | HEIGHT: 64 IN | OXYGEN SATURATION: 95 % | SYSTOLIC BLOOD PRESSURE: 132 MMHG

## 2018-05-15 DIAGNOSIS — J30.1 ACUTE SEASONAL ALLERGIC RHINITIS DUE TO POLLEN: ICD-10-CM

## 2018-05-15 DIAGNOSIS — I10 ESSENTIAL HYPERTENSION: Primary | ICD-10-CM

## 2018-05-15 DIAGNOSIS — F41.1 GAD (GENERALIZED ANXIETY DISORDER): ICD-10-CM

## 2018-05-15 PROCEDURE — G8427 DOCREV CUR MEDS BY ELIG CLIN: HCPCS | Performed by: FAMILY MEDICINE

## 2018-05-15 PROCEDURE — 1036F TOBACCO NON-USER: CPT | Performed by: FAMILY MEDICINE

## 2018-05-15 PROCEDURE — 3017F COLORECTAL CA SCREEN DOC REV: CPT | Performed by: FAMILY MEDICINE

## 2018-05-15 PROCEDURE — 99214 OFFICE O/P EST MOD 30 MIN: CPT | Performed by: FAMILY MEDICINE

## 2018-05-15 PROCEDURE — G8420 CALC BMI NORM PARAMETERS: HCPCS | Performed by: FAMILY MEDICINE

## 2018-05-15 PROCEDURE — 96372 THER/PROPH/DIAG INJ SC/IM: CPT | Performed by: FAMILY MEDICINE

## 2018-05-15 RX ORDER — ALPRAZOLAM 0.25 MG/1
0.25 TABLET ORAL 3 TIMES DAILY PRN
Qty: 60 TABLET | Refills: 1 | Status: SHIPPED | OUTPATIENT
Start: 2018-05-15 | End: 2018-11-09 | Stop reason: SDUPTHER

## 2018-05-15 RX ORDER — TRIAMCINOLONE ACETONIDE 40 MG/ML
80 INJECTION, SUSPENSION INTRA-ARTICULAR; INTRAMUSCULAR ONCE
Status: COMPLETED | OUTPATIENT
Start: 2018-05-15 | End: 2018-05-15

## 2018-05-15 RX ORDER — LOSARTAN POTASSIUM 100 MG/1
TABLET ORAL
Qty: 90 TABLET | Refills: 1 | Status: SHIPPED | OUTPATIENT
Start: 2018-05-15 | End: 2018-06-26 | Stop reason: SDUPTHER

## 2018-05-15 RX ADMIN — TRIAMCINOLONE ACETONIDE 80 MG: 40 INJECTION, SUSPENSION INTRA-ARTICULAR; INTRAMUSCULAR at 11:15

## 2018-05-15 ASSESSMENT — ENCOUNTER SYMPTOMS
RESPIRATORY NEGATIVE: 1
EYE REDNESS: 0
ABDOMINAL PAIN: 0
PHOTOPHOBIA: 0
WHEEZING: 0
BACK PAIN: 0
SHORTNESS OF BREATH: 0
EYE ITCHING: 0
ORTHOPNEA: 0
BLURRED VISION: 0
CONSTIPATION: 0
DIARRHEA: 0
GASTROINTESTINAL NEGATIVE: 1
EYES NEGATIVE: 1

## 2018-05-15 ASSESSMENT — PATIENT HEALTH QUESTIONNAIRE - PHQ9
SUM OF ALL RESPONSES TO PHQ9 QUESTIONS 1 & 2: 0
2. FEELING DOWN, DEPRESSED OR HOPELESS: 0
1. LITTLE INTEREST OR PLEASURE IN DOING THINGS: 0
SUM OF ALL RESPONSES TO PHQ QUESTIONS 1-9: 0

## 2018-05-29 DIAGNOSIS — I10 ESSENTIAL HYPERTENSION: ICD-10-CM

## 2018-05-30 ENCOUNTER — OFFICE VISIT (OUTPATIENT)
Dept: PRIMARY CARE CLINIC | Age: 63
End: 2018-05-30
Payer: COMMERCIAL

## 2018-05-30 VITALS
WEIGHT: 119.9 LBS | BODY MASS INDEX: 20.47 KG/M2 | HEART RATE: 77 BPM | SYSTOLIC BLOOD PRESSURE: 108 MMHG | OXYGEN SATURATION: 98 % | DIASTOLIC BLOOD PRESSURE: 78 MMHG | HEIGHT: 64 IN | TEMPERATURE: 97.8 F

## 2018-05-30 DIAGNOSIS — T14.8XXA BRUISING: Primary | ICD-10-CM

## 2018-05-30 DIAGNOSIS — I10 ESSENTIAL HYPERTENSION: ICD-10-CM

## 2018-05-30 PROCEDURE — G8420 CALC BMI NORM PARAMETERS: HCPCS | Performed by: FAMILY MEDICINE

## 2018-05-30 PROCEDURE — G8427 DOCREV CUR MEDS BY ELIG CLIN: HCPCS | Performed by: FAMILY MEDICINE

## 2018-05-30 PROCEDURE — 1036F TOBACCO NON-USER: CPT | Performed by: FAMILY MEDICINE

## 2018-05-30 PROCEDURE — 3017F COLORECTAL CA SCREEN DOC REV: CPT | Performed by: FAMILY MEDICINE

## 2018-05-30 PROCEDURE — 99213 OFFICE O/P EST LOW 20 MIN: CPT | Performed by: FAMILY MEDICINE

## 2018-05-30 RX ORDER — ATENOLOL 25 MG/1
25 TABLET ORAL 2 TIMES DAILY
Qty: 180 TABLET | Refills: 1 | Status: SHIPPED | OUTPATIENT
Start: 2018-05-30 | End: 2018-12-12 | Stop reason: SDUPTHER

## 2018-05-30 ASSESSMENT — ENCOUNTER SYMPTOMS
EYE REDNESS: 0
CHEST TIGHTNESS: 0
EYE PAIN: 0
DIARRHEA: 0
STRIDOR: 0
CHOKING: 0
ABDOMINAL PAIN: 0
COLOR CHANGE: 0
PHOTOPHOBIA: 0
FACIAL SWELLING: 0
CONSTIPATION: 0
SHORTNESS OF BREATH: 0
EYE DISCHARGE: 0
APNEA: 0
WHEEZING: 0
NAUSEA: 0

## 2018-05-31 DIAGNOSIS — T14.8XXA BRUISING: ICD-10-CM

## 2018-05-31 LAB
APTT: 25.7 SEC (ref 21.6–35.4)
BASOPHILS ABSOLUTE: 0 K/UL (ref 0–0.2)
BASOPHILS RELATIVE PERCENT: 0.7 %
EOSINOPHILS ABSOLUTE: 0.1 K/UL (ref 0–0.7)
EOSINOPHILS RELATIVE PERCENT: 1.4 %
HCT VFR BLD CALC: 42.2 % (ref 37–47)
HEMOGLOBIN: 14 G/DL (ref 12–16)
INR BLD: 1
LYMPHOCYTES ABSOLUTE: 1.9 K/UL (ref 1–4.8)
LYMPHOCYTES RELATIVE PERCENT: 32 %
MCH RBC QN AUTO: 32.5 PG (ref 27–31.3)
MCHC RBC AUTO-ENTMCNC: 33.1 % (ref 33–37)
MCV RBC AUTO: 98.3 FL (ref 82–100)
MONOCYTES ABSOLUTE: 0.6 K/UL (ref 0.2–0.8)
MONOCYTES RELATIVE PERCENT: 9.3 %
NEUTROPHILS ABSOLUTE: 3.4 K/UL (ref 1.4–6.5)
NEUTROPHILS RELATIVE PERCENT: 56.6 %
PDW BLD-RTO: 13.7 % (ref 11.5–14.5)
PLATELET # BLD: 258 K/UL (ref 130–400)
PROTHROMBIN TIME: 10.6 SEC (ref 9.6–12.3)
RBC # BLD: 4.3 M/UL (ref 4.2–5.4)
WBC # BLD: 6 K/UL (ref 4.8–10.8)

## 2018-06-03 LAB — VON WILLEBRAND AG: 215 % (ref 52–214)

## 2018-06-23 DIAGNOSIS — I10 ESSENTIAL HYPERTENSION: ICD-10-CM

## 2018-06-23 RX ORDER — LOSARTAN POTASSIUM 100 MG/1
TABLET ORAL
Qty: 90 TABLET | Refills: 0 | OUTPATIENT
Start: 2018-06-23

## 2018-06-26 DIAGNOSIS — I10 ESSENTIAL HYPERTENSION: ICD-10-CM

## 2018-06-27 RX ORDER — LOSARTAN POTASSIUM 100 MG/1
TABLET ORAL
Qty: 90 TABLET | Refills: 1 | Status: SHIPPED | OUTPATIENT
Start: 2018-06-27 | End: 2018-12-24 | Stop reason: SDUPTHER

## 2018-10-09 DIAGNOSIS — Z12.39 SCREENING BREAST EXAMINATION: Primary | ICD-10-CM

## 2018-10-13 ENCOUNTER — HOSPITAL ENCOUNTER (OUTPATIENT)
Dept: WOMENS IMAGING | Age: 63
Discharge: HOME | End: 2018-10-15
Payer: COMMERCIAL

## 2018-10-13 DIAGNOSIS — Z12.39 BREAST CANCER SCREENING: ICD-10-CM

## 2018-10-13 PROCEDURE — 77067 SCR MAMMO BI INCL CAD: CPT

## 2018-10-19 ENCOUNTER — OFFICE VISIT (OUTPATIENT)
Dept: PRIMARY CARE CLINIC | Age: 63
End: 2018-10-19
Payer: COMMERCIAL

## 2018-10-19 VITALS
RESPIRATION RATE: 16 BRPM | BODY MASS INDEX: 20.66 KG/M2 | SYSTOLIC BLOOD PRESSURE: 126 MMHG | HEIGHT: 64 IN | TEMPERATURE: 97.7 F | OXYGEN SATURATION: 96 % | WEIGHT: 121 LBS | HEART RATE: 79 BPM | DIASTOLIC BLOOD PRESSURE: 80 MMHG

## 2018-10-19 DIAGNOSIS — F41.1 GAD (GENERALIZED ANXIETY DISORDER): ICD-10-CM

## 2018-10-19 DIAGNOSIS — J01.90 ACUTE SINUSITIS, RECURRENCE NOT SPECIFIED, UNSPECIFIED LOCATION: ICD-10-CM

## 2018-10-19 DIAGNOSIS — K52.9 COLITIS: ICD-10-CM

## 2018-10-19 DIAGNOSIS — K58.9 IRRITABLE BOWEL SYNDROME, UNSPECIFIED TYPE: ICD-10-CM

## 2018-10-19 DIAGNOSIS — J30.1 SEASONAL ALLERGIC RHINITIS DUE TO POLLEN: ICD-10-CM

## 2018-10-19 DIAGNOSIS — I10 ESSENTIAL HYPERTENSION: Primary | ICD-10-CM

## 2018-10-19 PROCEDURE — 3017F COLORECTAL CA SCREEN DOC REV: CPT | Performed by: FAMILY MEDICINE

## 2018-10-19 PROCEDURE — 1036F TOBACCO NON-USER: CPT | Performed by: FAMILY MEDICINE

## 2018-10-19 PROCEDURE — G8420 CALC BMI NORM PARAMETERS: HCPCS | Performed by: FAMILY MEDICINE

## 2018-10-19 PROCEDURE — 96372 THER/PROPH/DIAG INJ SC/IM: CPT | Performed by: FAMILY MEDICINE

## 2018-10-19 PROCEDURE — G8427 DOCREV CUR MEDS BY ELIG CLIN: HCPCS | Performed by: FAMILY MEDICINE

## 2018-10-19 PROCEDURE — G8482 FLU IMMUNIZE ORDER/ADMIN: HCPCS | Performed by: FAMILY MEDICINE

## 2018-10-19 PROCEDURE — 99214 OFFICE O/P EST MOD 30 MIN: CPT | Performed by: FAMILY MEDICINE

## 2018-10-19 RX ORDER — ALPRAZOLAM 0.25 MG/1
TABLET ORAL
Refills: 1 | COMMUNITY
Start: 2018-10-09

## 2018-10-19 RX ORDER — BUDESONIDE 3 MG/1
CAPSULE, COATED PELLETS ORAL
Refills: 0 | COMMUNITY
Start: 2018-09-10 | End: 2018-12-26 | Stop reason: SDUPTHER

## 2018-10-19 RX ORDER — TRIAMCINOLONE ACETONIDE 40 MG/ML
80 INJECTION, SUSPENSION INTRA-ARTICULAR; INTRAMUSCULAR ONCE
Status: COMPLETED | OUTPATIENT
Start: 2018-10-19 | End: 2018-10-19

## 2018-10-19 RX ORDER — AZITHROMYCIN 250 MG/1
TABLET, FILM COATED ORAL
Qty: 6 TABLET | Refills: 0 | Status: SHIPPED | OUTPATIENT
Start: 2018-10-19 | End: 2018-12-10

## 2018-10-19 RX ADMIN — TRIAMCINOLONE ACETONIDE 80 MG: 40 INJECTION, SUSPENSION INTRA-ARTICULAR; INTRAMUSCULAR at 09:39

## 2018-10-19 ASSESSMENT — ENCOUNTER SYMPTOMS
RESPIRATORY NEGATIVE: 1
NAUSEA: 0
BACK PAIN: 0
SWOLLEN GLANDS: 0
SORE THROAT: 0
SINUS PRESSURE: 1
VOMITING: 0
SHORTNESS OF BREATH: 0
DIARRHEA: 0
RHINORRHEA: 1
WHEEZING: 0
HOARSE VOICE: 0
COUGH: 0
CONSTIPATION: 0
ABDOMINAL PAIN: 0
SINUS PAIN: 1

## 2018-10-19 NOTE — PROGRESS NOTES
tenderness present. No epistaxis. Eyes: Pupils are equal, round, and reactive to light. Conjunctivae and EOM are normal.   Neck: Normal range of motion. Neck supple. No thyromegaly present. Cardiovascular: Normal rate, regular rhythm and normal heart sounds. No murmur heard. Pulmonary/Chest: Effort normal and breath sounds normal. She has no wheezes. She exhibits no tenderness. Abdominal: Soft. Bowel sounds are normal. There is no tenderness. Musculoskeletal: Normal range of motion. She exhibits no edema or tenderness. Lymphadenopathy:     She has no cervical adenopathy. Neurological: She is alert and oriented to person, place, and time. She has normal reflexes. Coordination normal.   Skin: Skin is warm and dry. No rash noted. Psychiatric: She has a normal mood and affect. Thought content normal.   Nursing note and vitals reviewed. Assessment:      Diagnosis Orders   1. Essential hypertension     2. ADILENE (generalized anxiety disorder)  ALPRAZolam (XANAX) 0.25 MG tablet   3. Irritable bowel syndrome, unspecified type     4. Seasonal allergic rhinitis due to pollen  triamcinolone acetonide (KENALOG-40) injection 80 mg   5. Acute sinusitis, recurrence not specified, unspecified location  azithromycin (ZITHROMAX Z-LORAINE) 250 MG tablet   6. Colitis  budesonide (ENTOCORT EC) 3 MG extended release capsule       Plan:        Orders Placed This Encounter   Medications    triamcinolone acetonide (KENALOG-40) injection 80 mg    azithromycin (ZITHROMAX Z-LORAINE) 250 MG tablet     Sig: Take 2 pills today then one daily til finished     Dispense:  6 tablet     Refill:  0       Controlled Substances Monitoring:     Return in about 4 months (around 2/19/2019) for Review of Labs & Diagnostic Testing, Routine follow up. Anisa GREEN am scribing for and in the presence of Loren Salas DO. Electronically signed by : Loren Montgomery DO, personally performed the services described in this documentation, as scribed by [employer] Portillo in my presence, and it is both accurate and complete.  Electronically signed by: Jose Turcios DO    10/27/18 7:43 PM    Jose Turcios DO

## 2018-11-09 DIAGNOSIS — F41.1 GAD (GENERALIZED ANXIETY DISORDER): ICD-10-CM

## 2018-11-09 RX ORDER — ALPRAZOLAM 0.25 MG/1
0.25 TABLET ORAL 3 TIMES DAILY PRN
Qty: 60 TABLET | Refills: 1 | Status: SHIPPED | OUTPATIENT
Start: 2018-11-09 | End: 2019-01-16 | Stop reason: SDUPTHER

## 2018-11-09 RX ORDER — ALPRAZOLAM 0.25 MG/1
TABLET ORAL
Refills: 1 | OUTPATIENT
Start: 2018-11-09

## 2018-12-10 ENCOUNTER — OFFICE VISIT (OUTPATIENT)
Dept: PRIMARY CARE CLINIC | Age: 63
End: 2018-12-10
Payer: COMMERCIAL

## 2018-12-10 ENCOUNTER — TELEPHONE (OUTPATIENT)
Dept: PRIMARY CARE CLINIC | Age: 63
End: 2018-12-10

## 2018-12-10 VITALS
HEART RATE: 86 BPM | SYSTOLIC BLOOD PRESSURE: 134 MMHG | RESPIRATION RATE: 16 BRPM | TEMPERATURE: 97.7 F | HEIGHT: 64 IN | DIASTOLIC BLOOD PRESSURE: 86 MMHG | BODY MASS INDEX: 20.66 KG/M2 | WEIGHT: 121 LBS | OXYGEN SATURATION: 98 %

## 2018-12-10 DIAGNOSIS — J01.00 ACUTE NON-RECURRENT MAXILLARY SINUSITIS: Primary | ICD-10-CM

## 2018-12-10 PROCEDURE — 1036F TOBACCO NON-USER: CPT | Performed by: PHYSICIAN ASSISTANT

## 2018-12-10 PROCEDURE — G8420 CALC BMI NORM PARAMETERS: HCPCS | Performed by: PHYSICIAN ASSISTANT

## 2018-12-10 PROCEDURE — 99213 OFFICE O/P EST LOW 20 MIN: CPT | Performed by: PHYSICIAN ASSISTANT

## 2018-12-10 PROCEDURE — 3017F COLORECTAL CA SCREEN DOC REV: CPT | Performed by: PHYSICIAN ASSISTANT

## 2018-12-10 PROCEDURE — G8427 DOCREV CUR MEDS BY ELIG CLIN: HCPCS | Performed by: PHYSICIAN ASSISTANT

## 2018-12-10 PROCEDURE — G8482 FLU IMMUNIZE ORDER/ADMIN: HCPCS | Performed by: PHYSICIAN ASSISTANT

## 2018-12-10 RX ORDER — MOXIFLOXACIN HYDROCHLORIDE 400 MG/1
400 TABLET ORAL DAILY
Qty: 10 TABLET | Refills: 0 | Status: SHIPPED | OUTPATIENT
Start: 2018-12-10 | End: 2018-12-20

## 2018-12-10 RX ORDER — MOXIFLOXACIN HYDROCHLORIDE 400 MG/1
400 TABLET ORAL DAILY
Qty: 10 TABLET | Refills: 0 | Status: SHIPPED | OUTPATIENT
Start: 2018-12-10 | End: 2018-12-12 | Stop reason: SDUPTHER

## 2018-12-10 RX ORDER — FLUCONAZOLE 150 MG/1
150 TABLET ORAL ONCE
Qty: 2 TABLET | Refills: 0 | Status: SHIPPED | OUTPATIENT
Start: 2018-12-10 | End: 2018-12-10

## 2018-12-10 ASSESSMENT — ENCOUNTER SYMPTOMS
SINUS PRESSURE: 1
HOARSE VOICE: 0
COUGH: 1
SORE THROAT: 0

## 2018-12-10 NOTE — PATIENT INSTRUCTIONS
Patient Education        Sinusitis: Care Instructions  Your Care Instructions    Sinusitis is an infection of the lining of the sinus cavities in your head. Sinusitis often follows a cold. It causes pain and pressure in your head and face. In most cases, sinusitis gets better on its own in 1 to 2 weeks. But some mild symptoms may last for several weeks. Sometimes antibiotics are needed. Follow-up care is a key part of your treatment and safety. Be sure to make and go to all appointments, and call your doctor if you are having problems. It's also a good idea to know your test results and keep a list of the medicines you take. How can you care for yourself at home? · Take an over-the-counter pain medicine, such as acetaminophen (Tylenol), ibuprofen (Advil, Motrin), or naproxen (Aleve). Read and follow all instructions on the label. · If the doctor prescribed antibiotics, take them as directed. Do not stop taking them just because you feel better. You need to take the full course of antibiotics. · Be careful when taking over-the-counter cold or flu medicines and Tylenol at the same time. Many of these medicines have acetaminophen, which is Tylenol. Read the labels to make sure that you are not taking more than the recommended dose. Too much acetaminophen (Tylenol) can be harmful. · Breathe warm, moist air from a steamy shower, a hot bath, or a sink filled with hot water. Avoid cold, dry air. Using a humidifier in your home may help. Follow the directions for cleaning the machine. · Use saline (saltwater) nasal washes to help keep your nasal passages open and wash out mucus and bacteria. You can buy saline nose drops at a grocery store or drugstore. Or you can make your own at home by adding 1 teaspoon of salt and 1 teaspoon of baking soda to 2 cups of distilled water. If you make your own, fill a bulb syringe with the solution, insert the tip into your nostril, and squeeze gently. Patricia Locks your nose.   · Put a hot, wet towel or a warm gel pack on your face 3 or 4 times a day for 5 to 10 minutes each time. · Try a decongestant nasal spray like oxymetazoline (Afrin). Do not use it for more than 3 days in a row. Using it for more than 3 days can make your congestion worse. When should you call for help? Call your doctor now or seek immediate medical care if:    · You have new or worse swelling or redness in your face or around your eyes.     · You have a new or higher fever.    Watch closely for changes in your health, and be sure to contact your doctor if:    · You have new or worse facial pain.     · The mucus from your nose becomes thicker (like pus) or has new blood in it.     · You are not getting better as expected. Where can you learn more? Go to https://[email or web address]:USE [employer] Bankpegranteb.[employer]. org and sign in to your [employer] account. Enter W931 in the BTR box to learn more about \"Sinusitis: Care Instructions. \"     If you do not have an account, please click on the \"Sign Up Now\" link. Current as of: March 28, 2018  Content Version: 11.8  © 9291-7479 Healthwise, [employer]. Care instructions adapted under license by [facility] ([facility]). If you have questions about a medical condition or this instruction, always ask your healthcare professional. [address]  any warranty or liability for your use of this information.

## 2018-12-12 ENCOUNTER — OFFICE VISIT (OUTPATIENT)
Dept: PRIMARY CARE CLINIC | Age: 63
End: 2018-12-12
Payer: COMMERCIAL

## 2018-12-12 VITALS
DIASTOLIC BLOOD PRESSURE: 80 MMHG | HEART RATE: 113 BPM | WEIGHT: 117 LBS | RESPIRATION RATE: 16 BRPM | SYSTOLIC BLOOD PRESSURE: 140 MMHG | TEMPERATURE: 97.3 F | HEIGHT: 64 IN | OXYGEN SATURATION: 98 % | BODY MASS INDEX: 19.97 KG/M2

## 2018-12-12 DIAGNOSIS — F41.1 GAD (GENERALIZED ANXIETY DISORDER): ICD-10-CM

## 2018-12-12 DIAGNOSIS — J30.1 SEASONAL ALLERGIC RHINITIS DUE TO POLLEN: ICD-10-CM

## 2018-12-12 DIAGNOSIS — I10 ESSENTIAL HYPERTENSION: Primary | ICD-10-CM

## 2018-12-12 PROCEDURE — 99214 OFFICE O/P EST MOD 30 MIN: CPT | Performed by: FAMILY MEDICINE

## 2018-12-12 PROCEDURE — 93000 ELECTROCARDIOGRAM COMPLETE: CPT | Performed by: FAMILY MEDICINE

## 2018-12-12 PROCEDURE — G8420 CALC BMI NORM PARAMETERS: HCPCS | Performed by: FAMILY MEDICINE

## 2018-12-12 PROCEDURE — 3017F COLORECTAL CA SCREEN DOC REV: CPT | Performed by: FAMILY MEDICINE

## 2018-12-12 PROCEDURE — 1036F TOBACCO NON-USER: CPT | Performed by: FAMILY MEDICINE

## 2018-12-12 PROCEDURE — G8482 FLU IMMUNIZE ORDER/ADMIN: HCPCS | Performed by: FAMILY MEDICINE

## 2018-12-12 PROCEDURE — G8427 DOCREV CUR MEDS BY ELIG CLIN: HCPCS | Performed by: FAMILY MEDICINE

## 2018-12-12 RX ORDER — ATENOLOL 25 MG/1
25 TABLET ORAL DAILY
Qty: 90 TABLET | Refills: 1 | Status: SHIPPED | OUTPATIENT
Start: 2018-12-12

## 2018-12-12 ASSESSMENT — ENCOUNTER SYMPTOMS
DIARRHEA: 0
RESPIRATORY NEGATIVE: 1
BACK PAIN: 0
EYE REDNESS: 0
SHORTNESS OF BREATH: 0
EYES NEGATIVE: 1
WHEEZING: 0
EYE ITCHING: 0
ABDOMINAL PAIN: 0
GASTROINTESTINAL NEGATIVE: 1
ORTHOPNEA: 0
CONSTIPATION: 0
BLURRED VISION: 0
PHOTOPHOBIA: 0

## 2018-12-12 NOTE — PROGRESS NOTES
Subjective:      Patient ID: Aurora Brian is a 61 y.o. female who presents today for:  Chief Complaint   Patient presents with    Hypertension     pt has been having high /90 to 140/109. Pt admits to headaches and rapid heartbeats started Friday12/8/18, its usually under controll pt takes losartan 100mg and admits to stop taking atenolol 2 months ago due to BP being under control, pt was at walk in yesterday  for sinus infection and prescribed moxifloxacin 400 mg daily pt does admits to alot stress denies EKG. Hypertension   This is a chronic problem. The current episode started more than 1 year ago. The problem is unchanged. Associated symptoms include headaches. Pertinent negatives include no anxiety, blurred vision, chest pain, malaise/fatigue, neck pain, orthopnea, palpitations, peripheral edema, PND, shortness of breath or sweats. (Rapid heartbeats  ) There are no associated agents to hypertension. There are no known risk factors for coronary artery disease. Treatments tried: Losartan. The current treatment provides mild improvement. There are no compliance problems. There is no history of angina, kidney disease, CAD/MI, CVA, heart failure, left ventricular hypertrophy, PVD or retinopathy. There is no history of chronic renal disease, coarctation of the aorta, hyperaldosteronism, hypercortisolism, hyperparathyroidism, a hypertension causing med, pheochromocytoma, renovascular disease, sleep apnea or a thyroid problem.        Past Medical History:   Diagnosis Date    Allergic Conjunctivitis 5/15/2013    Allergic rhinitis 5/15/2013    Anxiety 10/12/2015    Anxiety, not on Paxil, Lexapro 1/12/2016    Breast cyst 6/2006    aspirated    Carotid stenosis 8/2008    Cervical arthritis 2/20/2014    Chronic sinusitis 5/15/2013    Colitis     collagenus    Decreased libido 2/20/2014    Depression 1/5/2016    Family discord,  1/5/2016    Family discord,  10/12/2015    Fatigue Respiratory: Negative. Negative for shortness of breath and wheezing. Cardiovascular: Negative. Negative for chest pain, palpitations, orthopnea and PND. Gastrointestinal: Negative. Negative for abdominal pain, constipation and diarrhea. Genitourinary: Negative. Negative for hematuria, pelvic pain and urgency. Musculoskeletal: Negative. Negative for back pain and neck pain. Skin: Negative. Neurological: Positive for headaches. Psychiatric/Behavioral: Negative. Negative for agitation and behavioral problems. The patient is not nervous/anxious. Objective:   BP (!) 140/80 (Site: Left Upper Arm, Position: Sitting, Cuff Size: Medium Adult)   Pulse 113   Temp 97.3 °F (36.3 °C)   Resp 16   Ht 5' 4\" (1.626 m)   Wt 117 lb (53.1 kg)   SpO2 98%   BMI 20.08 kg/m²     Physical Exam   Constitutional: She is oriented to person, place, and time. She appears well-developed and well-nourished. HENT:   Head: Normocephalic and atraumatic. Eyes: Pupils are equal, round, and reactive to light. Conjunctivae and EOM are normal.   Neck: Normal range of motion. Neck supple. No thyromegaly present. Cardiovascular: Normal rate, regular rhythm and normal heart sounds. No murmur heard. Pulmonary/Chest: Effort normal and breath sounds normal. She has no wheezes. She exhibits no tenderness. Abdominal: Soft. Bowel sounds are normal. There is no tenderness. Musculoskeletal: Normal range of motion. She exhibits no edema or tenderness. Lymphadenopathy:     She has no cervical adenopathy. Neurological: She is alert and oriented to person, place, and time. She has normal reflexes. Coordination normal.   Skin: Skin is warm and dry. No rash noted. Psychiatric: She has a normal mood and affect. Thought content normal.   Nursing note and vitals reviewed. Assessment:      Diagnosis Orders   1. Essential hypertension  EKG 12 Lead    atenolol (TENORMIN) 25 MG tablet   2.  ADILENE (generalized anxiety

## 2018-12-24 DIAGNOSIS — I10 ESSENTIAL HYPERTENSION: ICD-10-CM

## 2018-12-24 RX ORDER — LOSARTAN POTASSIUM 100 MG/1
TABLET ORAL
Qty: 90 TABLET | Refills: 1 | Status: SHIPPED | OUTPATIENT
Start: 2018-12-24

## 2018-12-26 ENCOUNTER — OFFICE VISIT (OUTPATIENT)
Dept: PRIMARY CARE CLINIC | Age: 63
End: 2018-12-26
Payer: COMMERCIAL

## 2018-12-26 VITALS
HEIGHT: 64 IN | TEMPERATURE: 97.4 F | HEART RATE: 57 BPM | BODY MASS INDEX: 20.14 KG/M2 | DIASTOLIC BLOOD PRESSURE: 80 MMHG | SYSTOLIC BLOOD PRESSURE: 128 MMHG | RESPIRATION RATE: 16 BRPM | WEIGHT: 118 LBS | OXYGEN SATURATION: 94 %

## 2018-12-26 DIAGNOSIS — K52.9 COLITIS: ICD-10-CM

## 2018-12-26 DIAGNOSIS — I10 ESSENTIAL HYPERTENSION: Primary | ICD-10-CM

## 2018-12-26 DIAGNOSIS — F41.9 ANXIETY: ICD-10-CM

## 2018-12-26 DIAGNOSIS — E78.5 DYSLIPIDEMIA: ICD-10-CM

## 2018-12-26 PROCEDURE — 99213 OFFICE O/P EST LOW 20 MIN: CPT | Performed by: FAMILY MEDICINE

## 2018-12-26 PROCEDURE — G8427 DOCREV CUR MEDS BY ELIG CLIN: HCPCS | Performed by: FAMILY MEDICINE

## 2018-12-26 PROCEDURE — 3017F COLORECTAL CA SCREEN DOC REV: CPT | Performed by: FAMILY MEDICINE

## 2018-12-26 PROCEDURE — G8420 CALC BMI NORM PARAMETERS: HCPCS | Performed by: FAMILY MEDICINE

## 2018-12-26 PROCEDURE — G8482 FLU IMMUNIZE ORDER/ADMIN: HCPCS | Performed by: FAMILY MEDICINE

## 2018-12-26 PROCEDURE — 1036F TOBACCO NON-USER: CPT | Performed by: FAMILY MEDICINE

## 2018-12-26 RX ORDER — BUDESONIDE 3 MG/1
CAPSULE, COATED PELLETS ORAL
Qty: 64 CAPSULE | Refills: 0 | Status: SHIPPED | OUTPATIENT
Start: 2018-12-26 | End: 2019-01-13 | Stop reason: SDUPTHER

## 2018-12-26 ASSESSMENT — ENCOUNTER SYMPTOMS
ORTHOPNEA: 0
PHOTOPHOBIA: 0
EYE REDNESS: 0
RESPIRATORY NEGATIVE: 1
CONSTIPATION: 0
GASTROINTESTINAL NEGATIVE: 1
DIARRHEA: 0
EYES NEGATIVE: 1
BLURRED VISION: 0
ABDOMINAL PAIN: 0
EYE ITCHING: 0
WHEEZING: 0
SHORTNESS OF BREATH: 0
BACK PAIN: 0

## 2018-12-26 NOTE — PROGRESS NOTES
 IBS (irritable bowel syndrome)     Infiltrate noted on imaging study 5/2/2016    Neck pain on left side 2/20/2014    Nocturnal headaches 8/6/2015    Primary insomnia 1/5/2016    Rosacea     S/P hysterectomy 4/21/2015    S/P total hysterectomy 2/20/2014    Sinusitis 8/6/2015    Social isolation 1/5/2016    Stress, daughter 8/6/2015    Uterine fibroid 10/2008     Past Surgical History:   Procedure Laterality Date    BACK SURGERY      BREAST BIOPSY  10/2009    COLONOSCOPY  01/10/2017    DR. FAUSTIN    HYSTERECTOMY, TOTAL ABDOMINAL  10/08    [facility]    SIGMOIDOSCOPY  07/30/2018    DR FAUSTIN    THUMB AMPUTATION  11/2007    joint replacement    TOE FUSION  4/2002    TOTAL HIP ARTHROPLASTY Left 10/6/2016    HIP TOTAL ARTHROPLASTY, LEFT, YOUNG MDM, ACCOLADE 2, TXA-IV  performed by Kala Hines MD at [facility] [place]     Family History   Problem Relation Age of Onset    Coronary Art Dis Father      Social History     Social History    Marital status:      Spouse name: N/A    Number of children: N/A    Years of education: N/A     Occupational History    Not on file. Social History Main Topics    Smoking status: Former Smoker     Packs/day: 0.25     Years: 5.00     Quit date: 1/1/1996    Smokeless tobacco: Never Used    Alcohol use Yes      Comment: rare    Drug use: No    Sexual activity: Not on file     Other Topics Concern    Not on file     Social History Narrative    No narrative on file     Allergies:  Cefdinir and Levaquin [levofloxacin in d5w]    Review of Systems   Constitutional: Negative. Negative for activity change, appetite change, fatigue and malaise/fatigue. HENT: Negative. Eyes: Negative. Negative for blurred vision, photophobia, redness and itching. Respiratory: Negative. Negative for shortness of breath and wheezing. Cardiovascular: Negative. Negative for chest pain, palpitations, orthopnea and PND. Gastrointestinal: Negative.   Negative for

## 2018-12-29 DIAGNOSIS — E78.5 DYSLIPIDEMIA: ICD-10-CM

## 2018-12-29 LAB
ALBUMIN SERPL-MCNC: 4.1 G/DL (ref 3.9–4.9)
ALP BLD-CCNC: 73 U/L (ref 40–130)
ALT SERPL-CCNC: 23 U/L (ref 0–33)
ANION GAP SERPL CALCULATED.3IONS-SCNC: 14 MEQ/L (ref 7–13)
AST SERPL-CCNC: 27 U/L (ref 0–35)
BILIRUB SERPL-MCNC: 0.5 MG/DL (ref 0–1.2)
BUN BLDV-MCNC: 14 MG/DL (ref 8–23)
CALCIUM SERPL-MCNC: 8.9 MG/DL (ref 8.6–10.2)
CHLORIDE BLD-SCNC: 100 MEQ/L (ref 98–107)
CHOLESTEROL, TOTAL: 221 MG/DL (ref 0–199)
CO2: 28 MEQ/L (ref 22–29)
CREAT SERPL-MCNC: 0.83 MG/DL (ref 0.5–0.9)
GFR AFRICAN AMERICAN: >60
GFR NON-AFRICAN AMERICAN: >60
GLOBULIN: 2.5 G/DL (ref 2.3–3.5)
GLUCOSE BLD-MCNC: 77 MG/DL (ref 74–109)
HDLC SERPL-MCNC: 75 MG/DL (ref 40–59)
LDL CHOLESTEROL CALCULATED: 129 MG/DL (ref 0–129)
POTASSIUM SERPL-SCNC: 4.1 MEQ/L (ref 3.5–5.1)
SODIUM BLD-SCNC: 142 MEQ/L (ref 132–144)
TOTAL PROTEIN: 6.6 G/DL (ref 6.4–8.1)
TRIGL SERPL-MCNC: 86 MG/DL (ref 0–200)

## 2019-01-11 RX ORDER — AZITHROMYCIN 250 MG/1
250 TABLET, FILM COATED ORAL SEE ADMIN INSTRUCTIONS
Qty: 6 TABLET | Refills: 0 | Status: SHIPPED | OUTPATIENT
Start: 2019-01-11 | End: 2019-01-16

## 2019-01-13 DIAGNOSIS — K52.9 COLITIS: ICD-10-CM

## 2019-01-14 RX ORDER — BUDESONIDE 3 MG/1
CAPSULE, COATED PELLETS ORAL
Qty: 64 CAPSULE | Refills: 0 | Status: SHIPPED | OUTPATIENT
Start: 2019-01-14

## 2019-01-16 DIAGNOSIS — F41.1 GAD (GENERALIZED ANXIETY DISORDER): ICD-10-CM

## 2019-01-16 RX ORDER — ALPRAZOLAM 0.25 MG/1
0.25 TABLET ORAL 3 TIMES DAILY PRN
Qty: 60 TABLET | Refills: 1 | Status: SHIPPED | OUTPATIENT
Start: 2019-01-16 | End: 2019-02-15

## 2019-02-16 ENCOUNTER — OFFICE VISIT (OUTPATIENT)
Dept: PRIMARY CARE CLINIC | Age: 64
End: 2019-02-16
Payer: COMMERCIAL

## 2019-02-16 VITALS
WEIGHT: 120 LBS | TEMPERATURE: 97.4 F | HEART RATE: 97 BPM | RESPIRATION RATE: 12 BRPM | HEIGHT: 64 IN | BODY MASS INDEX: 20.49 KG/M2 | DIASTOLIC BLOOD PRESSURE: 70 MMHG | SYSTOLIC BLOOD PRESSURE: 116 MMHG | OXYGEN SATURATION: 98 %

## 2019-02-16 DIAGNOSIS — E78.5 DYSLIPIDEMIA: ICD-10-CM

## 2019-02-16 DIAGNOSIS — J30.1 SEASONAL ALLERGIC RHINITIS DUE TO POLLEN: ICD-10-CM

## 2019-02-16 DIAGNOSIS — F41.1 GAD (GENERALIZED ANXIETY DISORDER): ICD-10-CM

## 2019-02-16 DIAGNOSIS — I10 ESSENTIAL HYPERTENSION: Primary | ICD-10-CM

## 2019-02-16 DIAGNOSIS — R05.9 COUGH: ICD-10-CM

## 2019-02-16 PROCEDURE — 99213 OFFICE O/P EST LOW 20 MIN: CPT | Performed by: FAMILY MEDICINE

## 2019-02-16 PROCEDURE — 3017F COLORECTAL CA SCREEN DOC REV: CPT | Performed by: FAMILY MEDICINE

## 2019-02-16 PROCEDURE — G8420 CALC BMI NORM PARAMETERS: HCPCS | Performed by: FAMILY MEDICINE

## 2019-02-16 PROCEDURE — G8482 FLU IMMUNIZE ORDER/ADMIN: HCPCS | Performed by: FAMILY MEDICINE

## 2019-02-16 PROCEDURE — G8427 DOCREV CUR MEDS BY ELIG CLIN: HCPCS | Performed by: FAMILY MEDICINE

## 2019-02-16 PROCEDURE — 1036F TOBACCO NON-USER: CPT | Performed by: FAMILY MEDICINE

## 2019-02-16 PROCEDURE — 96372 THER/PROPH/DIAG INJ SC/IM: CPT | Performed by: FAMILY MEDICINE

## 2019-02-16 RX ORDER — MONTELUKAST SODIUM 10 MG/1
10 TABLET ORAL DAILY
Qty: 30 TABLET | Refills: 3 | Status: SHIPPED | OUTPATIENT
Start: 2019-02-16 | End: 2019-03-19 | Stop reason: ALTCHOICE

## 2019-02-16 RX ORDER — TRIAMCINOLONE ACETONIDE 40 MG/ML
80 INJECTION, SUSPENSION INTRA-ARTICULAR; INTRAMUSCULAR ONCE
Status: COMPLETED | OUTPATIENT
Start: 2019-02-16 | End: 2019-02-16

## 2019-02-16 RX ADMIN — TRIAMCINOLONE ACETONIDE 80 MG: 40 INJECTION, SUSPENSION INTRA-ARTICULAR; INTRAMUSCULAR at 12:29

## 2019-02-24 ASSESSMENT — ENCOUNTER SYMPTOMS
SINUS PAIN: 0
GASTROINTESTINAL NEGATIVE: 1
RHINORRHEA: 1
SORE THROAT: 0
ABDOMINAL PAIN: 0
PHOTOPHOBIA: 0
WHEEZING: 0
EYE ITCHING: 0
SINUS PRESSURE: 0
SHORTNESS OF BREATH: 0
BACK PAIN: 0
CONSTIPATION: 0
DIARRHEA: 0
EYES NEGATIVE: 1
EYE REDNESS: 0
RESPIRATORY NEGATIVE: 1

## 2019-03-19 ENCOUNTER — OFFICE VISIT (OUTPATIENT)
Dept: PRIMARY CARE CLINIC | Age: 64
End: 2019-03-19
Payer: COMMERCIAL

## 2019-03-19 VITALS
WEIGHT: 119.8 LBS | RESPIRATION RATE: 16 BRPM | HEIGHT: 64 IN | TEMPERATURE: 97 F | DIASTOLIC BLOOD PRESSURE: 78 MMHG | BODY MASS INDEX: 20.45 KG/M2 | SYSTOLIC BLOOD PRESSURE: 124 MMHG | HEART RATE: 64 BPM

## 2019-03-19 DIAGNOSIS — R05.9 COUGH: ICD-10-CM

## 2019-03-19 DIAGNOSIS — J20.9 ACUTE BRONCHITIS, UNSPECIFIED ORGANISM: ICD-10-CM

## 2019-03-19 DIAGNOSIS — J01.00 ACUTE NON-RECURRENT MAXILLARY SINUSITIS: Primary | ICD-10-CM

## 2019-03-19 PROCEDURE — 1036F TOBACCO NON-USER: CPT | Performed by: FAMILY MEDICINE

## 2019-03-19 PROCEDURE — G8420 CALC BMI NORM PARAMETERS: HCPCS | Performed by: FAMILY MEDICINE

## 2019-03-19 PROCEDURE — 3017F COLORECTAL CA SCREEN DOC REV: CPT | Performed by: FAMILY MEDICINE

## 2019-03-19 PROCEDURE — G8482 FLU IMMUNIZE ORDER/ADMIN: HCPCS | Performed by: FAMILY MEDICINE

## 2019-03-19 PROCEDURE — G8427 DOCREV CUR MEDS BY ELIG CLIN: HCPCS | Performed by: FAMILY MEDICINE

## 2019-03-19 PROCEDURE — 99213 OFFICE O/P EST LOW 20 MIN: CPT | Performed by: FAMILY MEDICINE

## 2019-03-19 RX ORDER — DOXYCYCLINE 100 MG/1
100 TABLET ORAL 2 TIMES DAILY
Qty: 20 TABLET | Refills: 0 | Status: SHIPPED | OUTPATIENT
Start: 2019-03-19 | End: 2019-03-29

## 2019-03-19 RX ORDER — PROMETHAZINE HYDROCHLORIDE AND CODEINE PHOSPHATE 6.25; 1 MG/5ML; MG/5ML
5 SYRUP ORAL EVERY 4 HOURS PRN
Qty: 240 ML | Refills: 0 | Status: SHIPPED | OUTPATIENT
Start: 2019-03-19 | End: 2019-03-26

## 2019-03-19 ASSESSMENT — ENCOUNTER SYMPTOMS
HEMOPTYSIS: 0
RHINORRHEA: 0
ABDOMINAL PAIN: 0
GASTROINTESTINAL NEGATIVE: 1
PHOTOPHOBIA: 0
HEARTBURN: 0
SORE THROAT: 0
EYES NEGATIVE: 1
SHORTNESS OF BREATH: 0
EYE REDNESS: 0
CONSTIPATION: 0
EYE ITCHING: 0
BACK PAIN: 0
COUGH: 1
WHEEZING: 0
DIARRHEA: 0

## 2019-03-21 ENCOUNTER — TELEPHONE (OUTPATIENT)
Dept: PRIMARY CARE CLINIC | Age: 64
End: 2019-03-21

## 2019-03-21 RX ORDER — AZITHROMYCIN 250 MG/1
TABLET, FILM COATED ORAL
Qty: 6 TABLET | Refills: 0 | Status: ON HOLD | OUTPATIENT
Start: 2019-03-21 | End: 2020-01-24

## 2019-04-10 ENCOUNTER — TELEPHONE (OUTPATIENT)
Dept: FAMILY MEDICINE CLINIC | Age: 64
End: 2019-04-10

## 2020-01-24 ENCOUNTER — HOSPITAL ENCOUNTER (OUTPATIENT)
Age: 65
Setting detail: OUTPATIENT SURGERY
Discharge: HOME | End: 2020-01-24
Attending: PODIATRIST | Admitting: PODIATRIST
Payer: COMMERCIAL

## 2020-01-24 ENCOUNTER — ANESTHESIA (OUTPATIENT)
Dept: OPERATING ROOM | Age: 65
End: 2020-01-24
Payer: COMMERCIAL

## 2020-01-24 ENCOUNTER — ANESTHESIA EVENT (OUTPATIENT)
Dept: OPERATING ROOM | Age: 65
End: 2020-01-24
Payer: COMMERCIAL

## 2020-01-24 VITALS
DIASTOLIC BLOOD PRESSURE: 80 MMHG | OXYGEN SATURATION: 98 % | HEART RATE: 112 BPM | SYSTOLIC BLOOD PRESSURE: 138 MMHG | RESPIRATION RATE: 16 BRPM | WEIGHT: 117 LBS | BODY MASS INDEX: 19.97 KG/M2 | HEIGHT: 64 IN | TEMPERATURE: 97.8 F

## 2020-01-24 VITALS — DIASTOLIC BLOOD PRESSURE: 55 MMHG | OXYGEN SATURATION: 98 % | SYSTOLIC BLOOD PRESSURE: 99 MMHG

## 2020-01-24 PROCEDURE — C1713 ANCHOR/SCREW BN/BN,TIS/BN: HCPCS | Performed by: PODIATRIST

## 2020-01-24 PROCEDURE — 2580000003 HC RX 258: Performed by: PODIATRIST

## 2020-01-24 PROCEDURE — 2580000003 HC RX 258: Performed by: NURSE PRACTITIONER

## 2020-01-24 PROCEDURE — 3700000000 HC ANESTHESIA ATTENDED CARE: Performed by: PODIATRIST

## 2020-01-24 PROCEDURE — 7100000010 HC PHASE II RECOVERY - FIRST 15 MIN: Performed by: PODIATRIST

## 2020-01-24 PROCEDURE — 3700000001 HC ADD 15 MINUTES (ANESTHESIA): Performed by: PODIATRIST

## 2020-01-24 PROCEDURE — 2500000003 HC RX 250 WO HCPCS: Performed by: PODIATRIST

## 2020-01-24 PROCEDURE — 7100000011 HC PHASE II RECOVERY - ADDTL 15 MIN: Performed by: PODIATRIST

## 2020-01-24 PROCEDURE — 2500000003 HC RX 250 WO HCPCS: Performed by: STUDENT IN AN ORGANIZED HEALTH CARE EDUCATION/TRAINING PROGRAM

## 2020-01-24 PROCEDURE — 3600000003 HC SURGERY LEVEL 3 BASE: Performed by: PODIATRIST

## 2020-01-24 PROCEDURE — 6360000002 HC RX W HCPCS: Performed by: ANESTHESIOLOGY

## 2020-01-24 PROCEDURE — 2709999900 HC NON-CHARGEABLE SUPPLY: Performed by: PODIATRIST

## 2020-01-24 PROCEDURE — 3600000013 HC SURGERY LEVEL 3 ADDTL 15MIN: Performed by: PODIATRIST

## 2020-01-24 RX ORDER — PROPOFOL 10 MG/ML
INJECTION, EMULSION INTRAVENOUS CONTINUOUS PRN
Status: DISCONTINUED | OUTPATIENT
Start: 2020-01-24 | End: 2020-01-24 | Stop reason: SDUPTHER

## 2020-01-24 RX ORDER — LATANOPROST 50 UG/ML
SOLUTION/ DROPS OPHTHALMIC
COMMUNITY
Start: 2019-03-29

## 2020-01-24 RX ORDER — SODIUM CHLORIDE, SODIUM LACTATE, POTASSIUM CHLORIDE, CALCIUM CHLORIDE 600; 310; 30; 20 MG/100ML; MG/100ML; MG/100ML; MG/100ML
INJECTION, SOLUTION INTRAVENOUS CONTINUOUS
Status: DISCONTINUED | OUTPATIENT
Start: 2020-01-24 | End: 2020-01-24 | Stop reason: HOSPADM

## 2020-01-24 RX ORDER — METOCLOPRAMIDE HYDROCHLORIDE 5 MG/ML
10 INJECTION INTRAMUSCULAR; INTRAVENOUS
Status: DISCONTINUED | OUTPATIENT
Start: 2020-01-24 | End: 2020-01-24 | Stop reason: HOSPADM

## 2020-01-24 RX ORDER — ONDANSETRON 2 MG/ML
4 INJECTION INTRAMUSCULAR; INTRAVENOUS
Status: DISCONTINUED | OUTPATIENT
Start: 2020-01-24 | End: 2020-01-24 | Stop reason: HOSPADM

## 2020-01-24 RX ORDER — HYDROCODONE BITARTRATE AND ACETAMINOPHEN 5; 325 MG/1; MG/1
1 TABLET ORAL PRN
Status: DISCONTINUED | OUTPATIENT
Start: 2020-01-24 | End: 2020-01-24 | Stop reason: HOSPADM

## 2020-01-24 RX ORDER — BUPIVACAINE HYDROCHLORIDE 5 MG/ML
INJECTION, SOLUTION EPIDURAL; INTRACAUDAL PRN
Status: DISCONTINUED | OUTPATIENT
Start: 2020-01-24 | End: 2020-01-24 | Stop reason: ALTCHOICE

## 2020-01-24 RX ORDER — HYDROMORPHONE HCL 110MG/55ML
0.5 PATIENT CONTROLLED ANALGESIA SYRINGE INTRAVENOUS EVERY 10 MIN PRN
Status: DISCONTINUED | OUTPATIENT
Start: 2020-01-24 | End: 2020-01-24 | Stop reason: HOSPADM

## 2020-01-24 RX ORDER — LIDOCAINE HYDROCHLORIDE 10 MG/ML
1 INJECTION, SOLUTION EPIDURAL; INFILTRATION; INTRACAUDAL; PERINEURAL
Status: DISCONTINUED | OUTPATIENT
Start: 2020-01-24 | End: 2020-01-24 | Stop reason: HOSPADM

## 2020-01-24 RX ORDER — FENTANYL CITRATE 50 UG/ML
50 INJECTION, SOLUTION INTRAMUSCULAR; INTRAVENOUS EVERY 10 MIN PRN
Status: DISCONTINUED | OUTPATIENT
Start: 2020-01-24 | End: 2020-01-24 | Stop reason: HOSPADM

## 2020-01-24 RX ORDER — SODIUM CHLORIDE 0.9 % (FLUSH) 0.9 %
10 SYRINGE (ML) INJECTION PRN
Status: DISCONTINUED | OUTPATIENT
Start: 2020-01-24 | End: 2020-01-24 | Stop reason: HOSPADM

## 2020-01-24 RX ORDER — DIPHENHYDRAMINE HYDROCHLORIDE 50 MG/ML
12.5 INJECTION INTRAMUSCULAR; INTRAVENOUS
Status: DISCONTINUED | OUTPATIENT
Start: 2020-01-24 | End: 2020-01-24 | Stop reason: HOSPADM

## 2020-01-24 RX ORDER — PROPOFOL 10 MG/ML
INJECTION, EMULSION INTRAVENOUS PRN
Status: DISCONTINUED | OUTPATIENT
Start: 2020-01-24 | End: 2020-01-24 | Stop reason: SDUPTHER

## 2020-01-24 RX ORDER — MEPERIDINE HYDROCHLORIDE 50 MG/ML
12.5 INJECTION INTRAMUSCULAR; INTRAVENOUS; SUBCUTANEOUS EVERY 5 MIN PRN
Status: DISCONTINUED | OUTPATIENT
Start: 2020-01-24 | End: 2020-01-24 | Stop reason: HOSPADM

## 2020-01-24 RX ORDER — SERTRALINE HYDROCHLORIDE 25 MG/1
TABLET, FILM COATED ORAL
COMMUNITY
Start: 2019-06-20

## 2020-01-24 RX ORDER — ESZOPICLONE 2 MG/1
TABLET, FILM COATED ORAL
Status: ON HOLD | COMMUNITY
Start: 2019-06-20 | End: 2020-01-24

## 2020-01-24 RX ORDER — CLINDAMYCIN PHOSPHATE 900 MG/50ML
900 INJECTION INTRAVENOUS EVERY 8 HOURS
Status: DISCONTINUED | OUTPATIENT
Start: 2020-01-24 | End: 2020-01-24 | Stop reason: HOSPADM

## 2020-01-24 RX ORDER — MIDAZOLAM HYDROCHLORIDE 1 MG/ML
INJECTION INTRAMUSCULAR; INTRAVENOUS PRN
Status: DISCONTINUED | OUTPATIENT
Start: 2020-01-24 | End: 2020-01-24 | Stop reason: SDUPTHER

## 2020-01-24 RX ORDER — SODIUM CHLORIDE 0.9 % (FLUSH) 0.9 %
10 SYRINGE (ML) INJECTION EVERY 12 HOURS SCHEDULED
Status: DISCONTINUED | OUTPATIENT
Start: 2020-01-24 | End: 2020-01-24 | Stop reason: HOSPADM

## 2020-01-24 RX ORDER — MAGNESIUM HYDROXIDE 1200 MG/15ML
LIQUID ORAL CONTINUOUS PRN
Status: COMPLETED | OUTPATIENT
Start: 2020-01-24 | End: 2020-01-24

## 2020-01-24 RX ORDER — HYDROCODONE BITARTRATE AND ACETAMINOPHEN 5; 325 MG/1; MG/1
2 TABLET ORAL PRN
Status: DISCONTINUED | OUTPATIENT
Start: 2020-01-24 | End: 2020-01-24 | Stop reason: HOSPADM

## 2020-01-24 RX ORDER — OXYCODONE HYDROCHLORIDE AND ACETAMINOPHEN 5; 325 MG/1; MG/1
1 TABLET ORAL EVERY 6 HOURS PRN
Qty: 20 TABLET | Refills: 0 | Status: SHIPPED | OUTPATIENT
Start: 2020-01-24 | End: 2020-01-29

## 2020-01-24 RX ADMIN — MIDAZOLAM HYDROCHLORIDE 2 MG: 2 INJECTION, SOLUTION INTRAMUSCULAR; INTRAVENOUS at 09:33

## 2020-01-24 RX ADMIN — PROPOFOL 30 MG: 10 INJECTION, EMULSION INTRAVENOUS at 09:38

## 2020-01-24 RX ADMIN — SODIUM CHLORIDE, POTASSIUM CHLORIDE, SODIUM LACTATE AND CALCIUM CHLORIDE: 600; 310; 30; 20 INJECTION, SOLUTION INTRAVENOUS at 08:01

## 2020-01-24 RX ADMIN — CLINDAMYCIN PHOSPHATE 900 MG: 900 INJECTION, SOLUTION INTRAVENOUS at 09:40

## 2020-01-24 RX ADMIN — PROPOFOL 100 MCG/KG/MIN: 10 INJECTION, EMULSION INTRAVENOUS at 09:38

## 2020-01-24 ASSESSMENT — PULMONARY FUNCTION TESTS
PIF_VALUE: 0
PIF_VALUE: 1
PIF_VALUE: 0

## 2020-01-24 ASSESSMENT — PAIN - FUNCTIONAL ASSESSMENT: PAIN_FUNCTIONAL_ASSESSMENT: 0-10

## 2020-01-24 ASSESSMENT — LIFESTYLE VARIABLES: SMOKING_STATUS: 1

## 2020-01-24 ASSESSMENT — PAIN SCALES - GENERAL: PAINLEVEL_OUTOF10: 0

## 2020-01-24 NOTE — ANESTHESIA PRE PROCEDURE
Department of Anesthesiology  Preprocedure Note       Name:  Raul Lennon   Age:  59 y.o.  :  1955                                          MRN:  [id number]         Date:  2020      Surgeon: Domonique Edward):  Rebecca Park DPM    Procedure: AKIN OSTEOTOMY RIGHT 1ST TOE ARTHROPLASTY RIGHT 4TH TOE ARTHREX DA (OUTSIDE PAT DR. WOLFE) 60 MIN (Right Foot)    Medications prior to admission:   Prior to Admission medications    Medication Sig Start Date End Date Taking? Authorizing Provider   azithromycin (ZITHROMAX Z-LORAINE) 250 MG tablet Take 2 pills today then one daily til finished 3/21/19   Elle Monaes,    budesonide (ENTOCORT EC) 3 MG extended release capsule TAKE 3 CAPSULES BY MOUTH EVERY DAY for 6 (SIX) weeks then 2 (TWO) CAPSULES DAILY for 2 (TWO) weeks 19   Elle Grissom, DO   losartan (COZAAR) 100 MG tablet TAKE 1 TABLET DAILY 18   Elle Monaes, DO   atenolol (TENORMIN) 25 MG tablet Take 1 tablet by mouth daily 18   Elle Grissom, DO   ALPRAZolam Marvie Moment) 0.25 MG tablet TAKE 1 TABLET THREE TIMES DAILY AS NEEDED FOR ANXIETY 10/9/18   Historical Provider, MD   cetirizine (ZYRTEC) 10 MG tablet Take 10 mg by mouth daily as needed for Allergies    Historical Provider, MD   Lactobacillus-Inulin ([address] PO) Take by mouth One daily    Historical Provider, MD   latanoprost (XALATAN) 0.005 % ophthalmic solution Use 1 Drop in both eyes daily at bedtime. 16   Historical Provider, MD   Multiple Vitamins-Minerals (WOMENS ONE DAILY) TABS Take  by mouth.     Historical Provider, MD       Current medications:    Current Facility-Administered Medications   Medication Dose Route Frequency Provider Last Rate Last Dose    cloNIDine (CATAPRES) tablet 0.1 mg  0.1 mg Oral Once Debi Chary, APRN - CNP         Current Outpatient Medications   Medication Sig Dispense Refill    azithromycin (ZITHROMAX Z-LORAINE) 250 MG tablet Take 2 pills today then one daily til finished 6 Carotid stenosis 2008    Cervical arthritis 2014    Chronic sinusitis 5/15/2013    Colitis     collagenus    Decreased libido 2014    Depression 2016    Family discord,  2016    Family discord,  10/12/2015    Fatigue 2014    ADILENE (generalized anxiety disorder)     HA (headache) 2015    Health maintenance examination 2015    HTN (hypertension)     HTN (hypertension), controlled w/ wt loss 2014    IBS (irritable bowel syndrome)     Infiltrate noted on imaging study 2016    Neck pain on left side 2014    Nocturnal headaches 2015    Primary insomnia 2016    Rosacea     S/P hysterectomy 2015    S/P total hysterectomy 2014    Sinusitis 2015    Social isolation 2016    Stress, daughter 2015    Uterine fibroid 10/2008       Past Surgical History:        Procedure Laterality Date    BACK SURGERY      BREAST BIOPSY  10/2009    COLONOSCOPY  01/10/2017    DR. FAUSTIN    HYSTERECTOMY, TOTAL ABDOMINAL  10/08    [facility]    SIGMOIDOSCOPY  2018    DR FAUSTIN    THUMB AMPUTATION  2007    joint replacement    TOE FUSION  2002    TOTAL HIP ARTHROPLASTY Left 10/6/2016    HIP TOTAL ARTHROPLASTY, LEFT, YOUNG MDM, ACCOLADE 2, TXA-IV  performed by Lynn Vu MD at [facility] History:    Social History     Tobacco Use    Smoking status: Former Smoker     Packs/day: 0.25     Years: 5.00     Pack years: 1.25     Last attempt to quit: 1996     Years since quittin.0    Smokeless tobacco: Never Used   Substance Use Topics    Alcohol use: Yes     Comment: rare                                Counseling given: Not Answered      Vital Signs (Current): There were no vitals filed for this visit.                                            BP Readings from Last 3 Encounters:   19 124/78   19 116/70   18 128/80       NPO Status: Vascular: negative vascular ROS. Anesthesia Plan      MAC     ASA 3     (Popliteal nerve block)  Induction: intravenous. MIPS: Prophylactic antiemetics administered. Anesthetic plan and risks discussed with patient.         Attending anesthesiologist reviewed and agrees with Latisha Rodriguez MD   1/24/2020

## 2020-01-24 NOTE — OP NOTE
using 4-0 prolene suture in a simple interrupted fashion. The hallux wound was closed in layers with capsular closure of running suture of 4-0 vicryl. The subcutaneous tissue was closed with 4-0 Vicryl suture and skin was closed with 4-0 prolene in simple interrupted fashion. Dr. Elsa Cardenas scrubbed for the entire case. Elements of the case which included but were not limited to incision, dissection, preparation of site, implant, and closure were performed by resident Dr. Maribel Resendez under my direct supervision. All critical elements of this case were performed by Dr. Rashi Spears were dressed with betadine ointment adaptic, fluff dressings, Kerlix roll, and Ace wrap. Patient tolerated the anesthesia and procedure very well, was transferred to the PACU with all vital signs stable and brisk capillary refill time noted in all toes. Patient's intraoperative and postoperative disposition was discussed with the family in the postoperative consultation area. POSITION: Supine. ESTIMATED BLOOD LOSS: Minimal.     MATERIALS: Arthrex dynamite staples, 4-0   Vicryl, 4-0 prolene    DRAINS: None. SPECIMENS: None. PATHOLOGY: None. COMPLICATIONS: None. CONDITION: Satisfactory.      [facility] PGY-2

## 2020-01-24 NOTE — BRIEF OP NOTE
Brief Postoperative Note  ______________________________________________________________    Patient: Donna Morel  YOB: 1955  MRN: [id number]  Date of Procedure: 1/24/2020    Pre-Op Diagnosis: HALLUX VALGUS, HAMMERTOE    Post-Op Diagnosis: Same       Procedure(s):  AKIN OSTEOTOMY RIGHT 1ST TOE   ARTHROPLASTY RIGHT 4TH TOE      Anesthesia: Monitor Anesthesia Care    Surgeon(s):  Radha Gao DPM    Assistant:  Johnnie Gonzalez PGY2      Estimated Blood Loss (mL): less than 5 cc    Complications: None    Specimens:   * No specimens in log *    Implants:  * No implants in log *      Drains: * No LDAs found *    Findings: see op report    Adama Mckee DPM  Date: 1/24/2020  Time: 10:51 AM

## 2021-10-29 ENCOUNTER — HOSPITAL ENCOUNTER (OUTPATIENT)
Dept: WOMENS IMAGING | Age: 66
Discharge: HOME | End: 2021-10-31
Payer: MEDICARE

## 2021-10-29 VITALS — BODY MASS INDEX: 20.08 KG/M2 | HEIGHT: 64 IN

## 2021-10-29 DIAGNOSIS — Z12.31 ENCOUNTER FOR SCREENING MAMMOGRAM FOR BREAST CANCER: ICD-10-CM

## 2021-10-29 PROCEDURE — 77063 BREAST TOMOSYNTHESIS BI: CPT

## 2023-03-12 DIAGNOSIS — E78.5 HYPERLIPIDEMIA, UNSPECIFIED: ICD-10-CM

## 2023-03-12 DIAGNOSIS — I10 ESSENTIAL (PRIMARY) HYPERTENSION: ICD-10-CM

## 2023-03-12 RX ORDER — LOSARTAN POTASSIUM 100 MG/1
TABLET ORAL
Qty: 90 TABLET | Refills: 1 | Status: SHIPPED | OUTPATIENT
Start: 2023-03-12

## 2023-03-12 RX ORDER — SIMVASTATIN 10 MG/1
TABLET, FILM COATED ORAL
Qty: 90 TABLET | Refills: 1 | Status: SHIPPED | OUTPATIENT
Start: 2023-03-12 | End: 2023-09-11

## 2023-03-12 RX ORDER — ATENOLOL 25 MG/1
TABLET ORAL
Qty: 90 TABLET | Refills: 1 | Status: SHIPPED | OUTPATIENT
Start: 2023-03-12 | End: 2023-08-02 | Stop reason: ALTCHOICE

## 2023-03-20 ENCOUNTER — OFFICE VISIT (OUTPATIENT)
Dept: PRIMARY CARE | Facility: CLINIC | Age: 68
End: 2023-03-20
Payer: MEDICARE

## 2023-03-20 VITALS
DIASTOLIC BLOOD PRESSURE: 78 MMHG | HEART RATE: 81 BPM | BODY MASS INDEX: 20.66 KG/M2 | OXYGEN SATURATION: 97 % | RESPIRATION RATE: 16 BRPM | WEIGHT: 121 LBS | HEIGHT: 64 IN | SYSTOLIC BLOOD PRESSURE: 120 MMHG

## 2023-03-20 DIAGNOSIS — J40 BRONCHITIS: ICD-10-CM

## 2023-03-20 DIAGNOSIS — I10 PRIMARY HYPERTENSION: Primary | ICD-10-CM

## 2023-03-20 DIAGNOSIS — J01.80 OTHER ACUTE SINUSITIS, RECURRENCE NOT SPECIFIED: ICD-10-CM

## 2023-03-20 PROBLEM — F41.1 GENERALIZED ANXIETY DISORDER: Status: ACTIVE | Noted: 2023-03-20

## 2023-03-20 PROBLEM — F43.9 STRESS: Status: ACTIVE | Noted: 2023-03-20

## 2023-03-20 PROBLEM — M47.816 DEGENERATIVE ARTHRITIS OF LUMBAR SPINE: Status: ACTIVE | Noted: 2023-03-20

## 2023-03-20 PROBLEM — J30.1 ALLERGIC RHINITIS DUE TO POLLEN: Status: ACTIVE | Noted: 2023-03-20

## 2023-03-20 PROBLEM — E55.9 VITAMIN D DEFICIENCY: Status: ACTIVE | Noted: 2023-03-20

## 2023-03-20 PROBLEM — E78.5 DYSLIPIDEMIA: Status: ACTIVE | Noted: 2023-03-20

## 2023-03-20 PROBLEM — G47.00 INSOMNIA: Status: ACTIVE | Noted: 2023-03-20

## 2023-03-20 PROBLEM — M19.049 DEGENERATIVE JOINT DISEASE OF HAND: Status: ACTIVE | Noted: 2023-03-20

## 2023-03-20 PROCEDURE — 3078F DIAST BP <80 MM HG: CPT | Performed by: FAMILY MEDICINE

## 2023-03-20 PROCEDURE — 1036F TOBACCO NON-USER: CPT | Performed by: FAMILY MEDICINE

## 2023-03-20 PROCEDURE — 3074F SYST BP LT 130 MM HG: CPT | Performed by: FAMILY MEDICINE

## 2023-03-20 PROCEDURE — 96372 THER/PROPH/DIAG INJ SC/IM: CPT | Performed by: FAMILY MEDICINE

## 2023-03-20 PROCEDURE — 1159F MED LIST DOCD IN RCRD: CPT | Performed by: FAMILY MEDICINE

## 2023-03-20 PROCEDURE — 99214 OFFICE O/P EST MOD 30 MIN: CPT | Performed by: FAMILY MEDICINE

## 2023-03-20 RX ORDER — LATANOPROST 50 UG/ML
1 SOLUTION/ DROPS OPHTHALMIC NIGHTLY
COMMUNITY

## 2023-03-20 RX ORDER — BENZONATATE 100 MG/1
100 CAPSULE ORAL 3 TIMES DAILY PRN
Qty: 50 CAPSULE | Refills: 1 | Status: SHIPPED | OUTPATIENT
Start: 2023-03-20 | End: 2023-03-27

## 2023-03-20 RX ORDER — CEFTRIAXONE 1 G/1
1 INJECTION, POWDER, FOR SOLUTION INTRAMUSCULAR; INTRAVENOUS ONCE
Status: COMPLETED | OUTPATIENT
Start: 2023-03-20 | End: 2023-03-20

## 2023-03-20 RX ORDER — CEFTRIAXONE 1 G/1
1 INJECTION, POWDER, FOR SOLUTION INTRAMUSCULAR; INTRAVENOUS ONCE
Status: DISCONTINUED | OUTPATIENT
Start: 2023-03-20 | End: 2023-03-20

## 2023-03-20 RX ORDER — CEFDINIR 300 MG/1
300 CAPSULE ORAL 2 TIMES DAILY
Qty: 20 CAPSULE | Refills: 0 | Status: SHIPPED | OUTPATIENT
Start: 2023-03-20 | End: 2023-03-30

## 2023-03-20 RX ADMIN — CEFTRIAXONE 1 G: 1 INJECTION, POWDER, FOR SOLUTION INTRAMUSCULAR; INTRAVENOUS at 13:28

## 2023-03-20 NOTE — PROGRESS NOTES
Subjective   Patient ID: Tonie Polanco is a 67 y.o. female who presents for Sinusitis (Patient presents in office with a complaint of a sinus infection. Patient admits she has been experiencing a productive cough and congestion. Patient admits she has tried Azithromycin but received no relief. ).    HPI     Review of Systems  12 Systems have been reviewed as follows.  Constitutional: Fever, weight gain, weight loss, appetite change, night sweats, fatigue, chills.  Eyes : blurry, double vision, vision, loss, tearing, redness, pain, sensitivity to light, glaucoma.  Ears, nose, mouth, and throat: Hearing loss, ringing in the ears, ear pain, nasal congestion, nasal drainage, nosebleeds, mouth, throat, irritation tooth problem.  Cardiovascular :chest pain, pressure, heart racing, palpitations, sweating, leg swelling, high or low blood pressure  Pulmonary: Cough, yellow or green sputum, blood and sputum, shortness of breath, wheezing  Gastrointestinal: Nausea, vomiting, diarrhea, constipation, pain, blood in stool, or vomitus, heartburn, difficulty swallowing  Genitourinary: incontinence, abnormal bleeding, abnormal discharge, urinary frequency, urinary hesitancy, pain, impotence sexual problem, infection, urinary retention  Musculoskeletal: Pain, stiffness, joint, redness or warmth, arthritis, back pain, weakness, muscle wasting, sprain or fracture  Neuro: Weight weakness, dizziness, change in voice, change in taste change in vision, change in hearing, loss, or change of sensation, trouble walking, balance problems coordination problems, shaking, speech problem  Endocrine , cold or heat intolerance, blood sugar problem, weight gain or loss missed periods hot flashes, sweats, change in body hair, change in libido, increased thirst, increased urination  Heme/lymph: Swelling, bleeding, problem anemia, bruising, enlarged lymph nodes  Allergic/immunologic: H. plus nasal drip, watery itchy eyes, nasal drainage,  "immunosuppressed  The above were reviewed and noted negative except as noted in HPI and Problem List.    Objective   /78 (BP Location: Left arm, Patient Position: Sitting, BP Cuff Size: Adult)   Pulse 81   Resp 16   Ht 1.626 m (5' 4\")   Wt 54.9 kg (121 lb)   SpO2 97%   BMI 20.77 kg/m²     Physical Exam  Constitutional: Well developed, well nourished, alert and in no acute distress   Eyes: Normal external exam. Pupils equally round and reactive to light with normal accommodation and extraocular movements intact.  Neck: Supple, no lymphadenopathy or masses.   Cardiovascular: Regular rate and rhythm, normal S1 and S2, no murmurs, gallops, or rubs. Radial pulses normal. No peripheral edema.  Pulmonary: No respiratory distress, lungs clear to auscultation bilaterally. No wheezes, rhonchi, rales.  Abdomen: soft,non tender, non distended, without masses or HSM  Skin: Warm, well perfused, normal skin turgor and color.   Neurologic: Cranial nerves II-XII grossly intact.   Psychiatric: Mood calm and affect normal  Musculoskeletal: Moving all extremities without restriction      Assessment/Plan   Problem List Items Addressed This Visit          Respiratory    Bronchitis    Relevant Medications    benzonatate (Tessalon Perles) 100 mg capsule    Other Relevant Orders    Follow Up In [facility] - PCP       Circulatory    HTN (hypertension) - Primary    Relevant Orders    Follow Up In [facility] - PCP    Follow Up In [facility] - PCP       Infectious/Inflammatory    Other acute sinusitis    Relevant Medications    cefdinir (Omnicef) 300 mg capsule    benzonatate (Tessalon Perles) 100 mg capsule    cefTRIAXone (Rocephin) vial 1 g (Start on 3/20/2023  1:00 PM)    Other Relevant Orders    Follow Up In [facility] - PCP    Follow Up In [facility] - PCP       Call back in a week if symptoms do not resolve    "

## 2023-04-14 ENCOUNTER — APPOINTMENT (OUTPATIENT)
Dept: PRIMARY CARE | Facility: CLINIC | Age: 68
End: 2023-04-14
Payer: MEDICARE

## 2023-04-18 ENCOUNTER — OFFICE VISIT (OUTPATIENT)
Dept: PRIMARY CARE | Facility: CLINIC | Age: 68
End: 2023-04-18
Payer: MEDICARE

## 2023-04-18 VITALS
HEIGHT: 64 IN | WEIGHT: 120 LBS | RESPIRATION RATE: 14 BRPM | BODY MASS INDEX: 20.49 KG/M2 | OXYGEN SATURATION: 98 % | HEART RATE: 70 BPM | SYSTOLIC BLOOD PRESSURE: 132 MMHG | DIASTOLIC BLOOD PRESSURE: 74 MMHG

## 2023-04-18 DIAGNOSIS — R07.81 RIB PAIN: Primary | ICD-10-CM

## 2023-04-18 DIAGNOSIS — R05.2 SUBACUTE COUGH: ICD-10-CM

## 2023-04-18 DIAGNOSIS — I10 PRIMARY HYPERTENSION: ICD-10-CM

## 2023-04-18 DIAGNOSIS — R07.81 PLEURITIC PAIN: ICD-10-CM

## 2023-04-18 PROBLEM — R05.9 COUGH: Status: ACTIVE | Noted: 2023-04-18

## 2023-04-18 PROCEDURE — 99214 OFFICE O/P EST MOD 30 MIN: CPT | Performed by: FAMILY MEDICINE

## 2023-04-18 RX ORDER — PREDNISONE 10 MG/1
TABLET ORAL
Qty: 20 TABLET | Refills: 0 | Status: SHIPPED | OUTPATIENT
Start: 2023-04-18 | End: 2023-05-30 | Stop reason: ALTCHOICE

## 2023-04-18 RX ORDER — METHOCARBAMOL 500 MG/1
500 TABLET, FILM COATED ORAL NIGHTLY
Qty: 30 TABLET | Refills: 0 | Status: SHIPPED | OUTPATIENT
Start: 2023-04-18 | End: 2023-05-30 | Stop reason: ALTCHOICE

## 2023-04-18 RX ORDER — CEFUROXIME AXETIL 500 MG/1
500 TABLET ORAL 2 TIMES DAILY
Qty: 20 TABLET | Refills: 0 | Status: SHIPPED | OUTPATIENT
Start: 2023-04-18 | End: 2023-04-28

## 2023-04-18 ASSESSMENT — ENCOUNTER SYMPTOMS
NEUROLOGICAL NEGATIVE: 1
COUGH: 1
CONSTITUTIONAL NEGATIVE: 1
EYES NEGATIVE: 1
PSYCHIATRIC NEGATIVE: 1
GASTROINTESTINAL NEGATIVE: 1
OCCASIONAL FEELINGS OF UNSTEADINESS: 0
CARDIOVASCULAR NEGATIVE: 1
ALLERGIC/IMMUNOLOGIC NEGATIVE: 1
HEMATOLOGIC/LYMPHATIC NEGATIVE: 1
ENDOCRINE NEGATIVE: 1
DEPRESSION: 0
LOSS OF SENSATION IN FEET: 0

## 2023-04-18 NOTE — PROGRESS NOTES
"Subjective   Patient ID: Tonie Polanco is a 67 y.o. female who presents for Cough.    HPI Pt states she is coughing up yellow phelm and states she has a pain under her right rib. Pain is dull and states it is worse at night when she lays down. Pt states that it is irritated with certain positions.     Review of Systems   Constitutional: Negative.    HENT:  Positive for congestion.    Eyes: Negative.    Respiratory:  Positive for cough.    Cardiovascular: Negative.    Gastrointestinal: Negative.    Endocrine: Negative.    Genitourinary: Negative.    Musculoskeletal:  Back pain: rib pain right side..   Skin: Negative.    Allergic/Immunologic: Negative.    Neurological: Negative.    Hematological: Negative.    Psychiatric/Behavioral: Negative.          Objective   /74 (BP Location: Left arm, Patient Position: Sitting, BP Cuff Size: Adult)   Pulse 70   Resp 14   Ht 1.626 m (5' 4\")   Wt 54.4 kg (120 lb)   SpO2 98%   BMI 20.60 kg/m²     Physical Exam CONSTITUTIONAL- NAD, Pt is A & O x3, Vital signs reviewed per chart.  General Appearance- normal , good hygiene,talks easily  EYES-PERRLA conjunctiva and lids- normal  EARS/NOSE-TM's normal, head normocephalic and atraumatic, naso pharynx-no nasal discharge, no trismus,  oropharynx- normal without exudate  NECK- supple, FROM, without mass  thyroid- NT, normal size, no nodule noted  LYMPH- WNL  CV- RRR without murmur, gallop, or other abnormality.  PULM- CTA bilaterally  Respiratory effort- normal respiratory effort , no retractions or nasal flaring  ABDOMEN- normoactive BS's , soft , NT, no hepatosplenomegaly palpated, or masses. No pulsatile masses noted  extremities no edema,NT  SKIN- no abnormal skin lesions on inspection or palpation  neuro- no focal deficits  PSYCH- pleasant, normal judgement and insight     Assessment/Plan   Problem List Items Addressed This Visit       HTN (hypertension)    Pleuritic pain    Relevant Medications    predniSONE (Deltasone) " 10 mg tablet    cefuroxime (Ceftin) 500 mg tablet    methocarbamol (Robaxin) 500 mg tablet    Cough    Relevant Medications    predniSONE (Deltasone) 10 mg tablet    cefuroxime (Ceftin) 500 mg tablet    methocarbamol (Robaxin) 500 mg tablet     Other Visit Diagnoses       Rib pain    -  Primary    Relevant Medications    predniSONE (Deltasone) 10 mg tablet    cefuroxime (Ceftin) 500 mg tablet    methocarbamol (Robaxin) 500 mg tablet    Other Relevant Orders    XR chest 2 views             Scribe Attestation  By signing my name below, I, Vimal Roblero DO  , Scribe   attest that this documentation has been prepared under the direction and in the presence of Vimal Roblero DO.

## 2023-05-01 ENCOUNTER — TELEPHONE (OUTPATIENT)
Dept: PRIMARY CARE | Facility: CLINIC | Age: 68
End: 2023-05-01
Payer: MEDICARE

## 2023-05-01 DIAGNOSIS — J01.00 SUBACUTE MAXILLARY SINUSITIS: Primary | ICD-10-CM

## 2023-05-01 NOTE — TELEPHONE ENCOUNTER
Tonie Polanco     Pt called she has a sinus infection asking for an antibiotic, declined appointment.  Sinus pressure, congestion, sore throat. She completed the medication given by Dr Roblero.    [address]

## 2023-05-02 ENCOUNTER — OFFICE VISIT (OUTPATIENT)
Dept: PRIMARY CARE | Facility: CLINIC | Age: 68
End: 2023-05-02
Payer: MEDICARE

## 2023-05-02 VITALS
OXYGEN SATURATION: 95 % | DIASTOLIC BLOOD PRESSURE: 74 MMHG | SYSTOLIC BLOOD PRESSURE: 106 MMHG | HEIGHT: 64 IN | WEIGHT: 123.4 LBS | BODY MASS INDEX: 21.07 KG/M2 | HEART RATE: 104 BPM

## 2023-05-02 DIAGNOSIS — J06.9 VIRAL UPPER RESPIRATORY TRACT INFECTION: Primary | ICD-10-CM

## 2023-05-02 DIAGNOSIS — R43.2 LOSS OF TASTE: ICD-10-CM

## 2023-05-02 PROCEDURE — U0005 INFEC AGEN DETEC AMPLI PROBE: HCPCS

## 2023-05-02 PROCEDURE — 1159F MED LIST DOCD IN RCRD: CPT | Performed by: FAMILY MEDICINE

## 2023-05-02 PROCEDURE — 1160F RVW MEDS BY RX/DR IN RCRD: CPT | Performed by: FAMILY MEDICINE

## 2023-05-02 PROCEDURE — 3074F SYST BP LT 130 MM HG: CPT | Performed by: FAMILY MEDICINE

## 2023-05-02 PROCEDURE — 1036F TOBACCO NON-USER: CPT | Performed by: FAMILY MEDICINE

## 2023-05-02 PROCEDURE — 99213 OFFICE O/P EST LOW 20 MIN: CPT | Performed by: FAMILY MEDICINE

## 2023-05-02 PROCEDURE — 3078F DIAST BP <80 MM HG: CPT | Performed by: FAMILY MEDICINE

## 2023-05-02 PROCEDURE — U0004 COV-19 TEST NON-CDC HGH THRU: HCPCS

## 2023-05-02 RX ORDER — AMOXICILLIN AND CLAVULANATE POTASSIUM 875; 125 MG/1; MG/1
875 TABLET, FILM COATED ORAL 2 TIMES DAILY
Qty: 20 TABLET | Refills: 0 | Status: SHIPPED | OUTPATIENT
Start: 2023-05-02 | End: 2023-05-12

## 2023-05-02 RX ORDER — CEFTRIAXONE 1 G/1
1 INJECTION, POWDER, FOR SOLUTION INTRAMUSCULAR; INTRAVENOUS ONCE
Status: COMPLETED | OUTPATIENT
Start: 2023-05-02 | End: 2023-05-19

## 2023-05-02 ASSESSMENT — ENCOUNTER SYMPTOMS
ACTIVITY CHANGE: 0
FATIGUE: 0
SHORTNESS OF BREATH: 0
PALPITATIONS: 0
ADENOPATHY: 0
EYE PAIN: 0
ABDOMINAL DISTENTION: 0
SLEEP DISTURBANCE: 0
AGITATION: 0
FEVER: 0
COLOR CHANGE: 0
ARTHRALGIAS: 0
DECREASED CONCENTRATION: 0
SINUS PAIN: 0
ABDOMINAL PAIN: 0
CHILLS: 1
CHEST TIGHTNESS: 0
POLYDIPSIA: 0
MYALGIAS: 0
NECK STIFFNESS: 0
RECTAL PAIN: 0
TROUBLE SWALLOWING: 0
APPETITE CHANGE: 0
PHOTOPHOBIA: 0
CONSTIPATION: 0
RHINORRHEA: 0
BLOOD IN STOOL: 0
CONFUSION: 0
COUGH: 1
SORE THROAT: 0
SPEECH DIFFICULTY: 0
HEADACHES: 1
DYSURIA: 0
DYSPHORIC MOOD: 0
DIZZINESS: 0
SEIZURES: 0
NERVOUS/ANXIOUS: 0
HEMATURIA: 0
FLANK PAIN: 0
STRIDOR: 0
DIARRHEA: 0
POLYPHAGIA: 0
SINUS PRESSURE: 0

## 2023-05-02 NOTE — PROGRESS NOTES
Subjective   Patient ID: Tonie Polanco is a 67 y.o. female who presents for URI.    URI   Associated symptoms include coughing and headaches. Pertinent negatives include no abdominal pain, chest pain, congestion, diarrhea, dysuria, ear pain, rash, rhinorrhea, sinus pain or sore throat.      Patient presents today with c/o headache, yellowish mucus, cough and chills. Onset about 3 days ago. Patient has not taken anything for her symptoms. Patient states her symptoms are worsening. Patient to at home covid test yesterday and it was negative.     Review of Systems   Constitutional:  Positive for chills. Negative for activity change, appetite change, fatigue and fever.   HENT:  Negative for congestion, dental problem, ear discharge, ear pain, mouth sores, rhinorrhea, sinus pressure, sinus pain, sore throat, tinnitus and trouble swallowing.    Eyes:  Negative for photophobia, pain and visual disturbance.   Respiratory:  Positive for cough. Negative for chest tightness, shortness of breath and stridor.    Cardiovascular:  Negative for chest pain and palpitations.   Gastrointestinal:  Negative for abdominal distention, abdominal pain, blood in stool, constipation, diarrhea and rectal pain.   Endocrine: Negative for cold intolerance, heat intolerance, polydipsia, polyphagia and polyuria.   Genitourinary:  Negative for dysuria, flank pain, hematuria and urgency.   Musculoskeletal:  Negative for arthralgias, gait problem, myalgias and neck stiffness.   Skin:  Negative for color change and rash.   Allergic/Immunologic: Negative for environmental allergies and food allergies.   Neurological:  Positive for headaches. Negative for dizziness, seizures, syncope and speech difficulty.   Hematological:  Negative for adenopathy.   Psychiatric/Behavioral:  Negative for agitation, confusion, decreased concentration, dysphoric mood and sleep disturbance. The patient is not nervous/anxious.        Objective   /74   Pulse 104   Ht  "1.626 m (5' 4\")   Wt 56 kg (123 lb 6.4 oz)   SpO2 95%   BMI 21.18 kg/m²     Physical Exam  Vitals reviewed.   Constitutional:       General: She is not in acute distress.     Appearance: Normal appearance. She is normal weight. She is not ill-appearing or diaphoretic.   HENT:      Head: Normocephalic.      Right Ear: Tympanic membrane and external ear normal.      Left Ear: Tympanic membrane and external ear normal.      Nose: Nose normal. No congestion.      Mouth/Throat:      Mouth: Mucous membranes are dry.      Pharynx: No posterior oropharyngeal erythema.   Eyes:      General:         Right eye: No discharge.         Left eye: No discharge.      Extraocular Movements: Extraocular movements intact.      Conjunctiva/sclera: Conjunctivae normal.      Pupils: Pupils are equal, round, and reactive to light.   Cardiovascular:      Rate and Rhythm: Normal rate and regular rhythm.      Pulses: Normal pulses.      Heart sounds: Normal heart sounds. No murmur heard.  Pulmonary:      Effort: No respiratory distress.      Breath sounds: Rhonchi present. No wheezing or rales.      Comments: Moderate congestion on cough wheezing.  Respiratory rate 20  Chest:      Chest wall: Tenderness present.   Abdominal:      General: Abdomen is flat. Bowel sounds are normal. There is no distension.      Palpations: There is no mass.      Tenderness: There is no abdominal tenderness. There is no guarding.   Genitourinary:     Rectum: Normal.   Musculoskeletal:         General: No tenderness. Normal range of motion.      Cervical back: Normal range of motion and neck supple. No tenderness.      Right lower leg: No edema.      Left lower leg: No edema.   Skin:     General: Skin is warm and dry.      Coloration: Skin is not jaundiced.      Findings: No bruising or erythema.   Neurological:      General: No focal deficit present.      Mental Status: She is alert and oriented to person, place, and time. Mental status is at baseline.      " Cranial Nerves: No cranial nerve deficit.      Sensory: No sensory deficit.      Coordination: Coordination normal.      Gait: Gait normal.   Psychiatric:         Mood and Affect: Mood normal.         Thought Content: Thought content normal.         Judgment: Judgment normal.         Assessment/Plan   Problem List Items Addressed This Visit    None  Visit Diagnoses       Viral upper respiratory tract infection    -  Primary    Relevant Medications    cefTRIAXone (Rocephin) vial 1 g    Other Relevant Orders    Sars-CoV-2 PCR, Symptomatic    Influenza A, and B PCR    Loss of taste        Relevant Medications    cefTRIAXone (Rocephin) vial 1 g    Other Relevant Orders    Sars-CoV-2 PCR, Symptomatic    Influenza A, and B PCR              Scribe Attestation  By signing my name below, IArely RMA , [employer]   attest that this documentation has been prepared under the direction and in the presence of Mathew Batista DO.   Provider Attestation - Scribe documentation    All medical record entries made by the Scribe were at my direction and personally dictated by me. I have reviewed the chart and agree that the record accurately reflects my personal performance of the history, physical exam, discussion and plan.

## 2023-05-02 NOTE — LETTER
May 4, 2023     Tonie Polanco  [address] [place] [address] [address]  [place] [address]      Dear MsLatia Polanco:    Below are the results from your recent visit:  INFLUENZA/COVID RESULT NEGATIVE    Resulted Orders   Sars-CoV-2 PCR, Symptomatic   Result Value Ref Range    SARS-COV-2 RESULT NOT DETECTED Not Detected      Comment:      .  This assay is designed to detect the ORF1a/b and E genes of SARS-CoV-2 via   nucleic acid amplification. A Not Detected result does not preclude   2019-nCoV infection since the adequacy of sample collection and/or low viral   burden may result in presence of viral nucleic acids below the clinical   sensitivity of this test method.     Fact sheet for providers: https://www.fda.gov/media/363050/download   Fact sheet for patients: https://www.fda.gov/media/379250/download     This test has received FDA Emergency Use Authorization (EUA) and has been   verified for use by [facility] ([facility]).   This test is only authorized for the duration of time that circumstances   exist to justify the authorization of the emergency use of in vitro   diagnostic tests for the detection of SARS-CoV-2 virus and/or diagnosis of   COVID-19 infection under section 564(b)(1) of the Act, 21 U.S.C.   360bbb-3(b)(1), unless the authorization is terminated or revoked  sooner.    [facility] is certified under CLIA-88 as   qualified to perform high complexity testing. Testing is performed in the   [facility] laboratories located at [address] [place] [address].   Influenza A, and B PCR   Result Value Ref Range    Flu A Result NOT DETECTED Not Detected      Comment:       Respiratory virus testing is performed routinely by PCR    for Influenza A/B and RSV. If Influenza and RSV PCR are    negative, testing for parainfluenza 1,2,3 viruses and    adenovirus is routinely performed for oncology inpatients    and intensive care unit patients at [facility] and is  available   on request on other patients by calling Laboratory Client   Services at [phone].   Not Detected results do not preclude Influenza A/B or RSV   infections since the adequacy of sample collection or low   viral burden may impact the clinical sensitivity of this   test method.    Flu B Result NOT DETECTED Not Detected      Comment:       Respiratory virus testing is performed routinely by PCR    for Influenza A/B and RSV. If Influenza and RSV PCR are    negative, testing for parainfluenza 1,2,3 viruses and    adenovirus is routinely performed for oncology inpatients    and intensive care unit patients at [facility] and is available   on request on other patients by calling Laboratory Client   Services at [phone]   Not Detected results do not preclude Influenza A/B or RSV   infections since the adequacy of sample collection or low   viral burden may impact the clinical sensitivity of this   test method.     The test results show that your current treatment is working. Please continue your current medication and plan.     If you have any questions or concerns, please don't hesitate to call.         Sincerely,        Mathew Batista, DO

## 2023-05-04 LAB
FLU A RESULT: NOT DETECTED
FLU B RESULT: NOT DETECTED
SARS-COV-2 RESULT: NOT DETECTED

## 2023-05-08 DIAGNOSIS — R05.2 SUBACUTE COUGH: ICD-10-CM

## 2023-05-08 RX ORDER — PROMETHAZINE HYDROCHLORIDE AND CODEINE PHOSPHATE 6.25; 1 MG/5ML; MG/5ML
5 SOLUTION ORAL EVERY 6 HOURS PRN
Qty: 120 ML | Refills: 0 | Status: SHIPPED | OUTPATIENT
Start: 2023-05-08 | End: 2023-05-14 | Stop reason: ALTCHOICE

## 2023-05-08 NOTE — TELEPHONE ENCOUNTER
Dr. Batista patient    Patient says that cough syrup was never called in after her last visit. Pharmacy is [facility] on [facility] in [place].     Please advise thank you.

## 2023-05-11 ENCOUNTER — TELEPHONE (OUTPATIENT)
Dept: PRIMARY CARE | Facility: CLINIC | Age: 68
End: 2023-05-11
Payer: MEDICARE

## 2023-05-11 DIAGNOSIS — R05.1 ACUTE COUGH: Primary | ICD-10-CM

## 2023-05-11 NOTE — TELEPHONE ENCOUNTER
Pt chaka pt   [facility] will not fill promethazine-codeine plz send medicine with out codeine.  Thank you!

## 2023-05-14 RX ORDER — BENZONATATE 100 MG/1
100-200 CAPSULE ORAL 3 TIMES DAILY PRN
Qty: 60 CAPSULE | Refills: 1 | Status: SHIPPED | OUTPATIENT
Start: 2023-05-14 | End: 2023-05-30 | Stop reason: ALTCHOICE

## 2023-05-19 PROCEDURE — 96372 THER/PROPH/DIAG INJ SC/IM: CPT | Performed by: FAMILY MEDICINE

## 2023-05-19 RX ADMIN — CEFTRIAXONE 1 G: 1 INJECTION, POWDER, FOR SOLUTION INTRAMUSCULAR; INTRAVENOUS at 10:40

## 2023-05-22 ENCOUNTER — OFFICE VISIT (OUTPATIENT)
Dept: PRIMARY CARE | Facility: CLINIC | Age: 68
End: 2023-05-22
Payer: MEDICARE

## 2023-05-22 VITALS
OXYGEN SATURATION: 95 % | HEART RATE: 67 BPM | WEIGHT: 122 LBS | HEIGHT: 64 IN | SYSTOLIC BLOOD PRESSURE: 102 MMHG | DIASTOLIC BLOOD PRESSURE: 70 MMHG | BODY MASS INDEX: 20.83 KG/M2 | RESPIRATION RATE: 16 BRPM

## 2023-05-22 DIAGNOSIS — J32.9 SINUSITIS, UNSPECIFIED CHRONICITY, UNSPECIFIED LOCATION: ICD-10-CM

## 2023-05-22 DIAGNOSIS — J30.1 SEASONAL ALLERGIC RHINITIS DUE TO POLLEN: ICD-10-CM

## 2023-05-22 DIAGNOSIS — E78.5 DYSLIPIDEMIA: ICD-10-CM

## 2023-05-22 DIAGNOSIS — I10 PRIMARY HYPERTENSION: ICD-10-CM

## 2023-05-22 PROBLEM — J40 BRONCHITIS: Status: RESOLVED | Noted: 2023-03-20 | Resolved: 2023-05-22

## 2023-05-22 PROBLEM — R07.81 PLEURITIC PAIN: Status: RESOLVED | Noted: 2023-04-18 | Resolved: 2023-05-22

## 2023-05-22 PROBLEM — J01.80 OTHER ACUTE SINUSITIS: Status: RESOLVED | Noted: 2023-03-20 | Resolved: 2023-05-22

## 2023-05-22 PROBLEM — R05.9 COUGH: Status: RESOLVED | Noted: 2023-04-18 | Resolved: 2023-05-22

## 2023-05-22 PROCEDURE — 1036F TOBACCO NON-USER: CPT | Performed by: FAMILY MEDICINE

## 2023-05-22 PROCEDURE — 96372 THER/PROPH/DIAG INJ SC/IM: CPT | Performed by: FAMILY MEDICINE

## 2023-05-22 PROCEDURE — 99214 OFFICE O/P EST MOD 30 MIN: CPT | Performed by: FAMILY MEDICINE

## 2023-05-22 PROCEDURE — 1160F RVW MEDS BY RX/DR IN RCRD: CPT | Performed by: FAMILY MEDICINE

## 2023-05-22 PROCEDURE — 3074F SYST BP LT 130 MM HG: CPT | Performed by: FAMILY MEDICINE

## 2023-05-22 PROCEDURE — 3078F DIAST BP <80 MM HG: CPT | Performed by: FAMILY MEDICINE

## 2023-05-22 PROCEDURE — 1159F MED LIST DOCD IN RCRD: CPT | Performed by: FAMILY MEDICINE

## 2023-05-22 RX ORDER — TRIAMCINOLONE ACETONIDE 40 MG/ML
80 INJECTION, SUSPENSION INTRA-ARTICULAR; INTRAMUSCULAR ONCE
Status: COMPLETED | OUTPATIENT
Start: 2023-05-22 | End: 2023-05-22

## 2023-05-22 RX ORDER — BUDESONIDE 3 MG/1
CAPSULE, COATED PELLETS ORAL
COMMUNITY
Start: 2019-01-14

## 2023-05-22 RX ORDER — CEFDINIR 300 MG/1
300 CAPSULE ORAL 2 TIMES DAILY
Qty: 20 CAPSULE | Refills: 0 | Status: SHIPPED | OUTPATIENT
Start: 2023-05-22 | End: 2023-06-01

## 2023-05-22 RX ADMIN — TRIAMCINOLONE ACETONIDE 80 MG: 40 INJECTION, SUSPENSION INTRA-ARTICULAR; INTRAMUSCULAR at 15:19

## 2023-05-22 ASSESSMENT — ENCOUNTER SYMPTOMS
SINUS COMPLAINT: 1
SINUS PRESSURE: 1
SORE THROAT: 1

## 2023-05-22 NOTE — PROGRESS NOTES
Subjective   Patient ID: Tonie Polanco is a 67 y.o. female who presents for Sinus Problem.    Pt also states she had a shingles shot last Thursday and seems to be having an allergic reaction.     Sinus Problem  This is a new problem. The problem is unchanged. Associated symptoms include congestion, sinus pressure and a sore throat. (sweating) Treatments tried: advil. The treatment provided no relief.        Review of Systems   HENT:  Positive for congestion, sinus pressure and sore throat.      12 Systems have been reviewed as follows.  Constitutional: Fever, weight gain, weight loss, appetite change, night sweats, fatigue, chills.  Eyes : blurry, double vision, vision, loss, tearing, redness, pain, sensitivity to light, glaucoma.  Ears, nose, mouth, and throat: Hearing loss, ringing in the ears, ear pain, nasal congestion, nasal drainage, nosebleeds, mouth, throat, irritation tooth problem.  Cardiovascular :chest pain, pressure, heart racing, palpitations, sweating, leg swelling, high or low blood pressure  Pulmonary: Cough, yellow or green sputum, blood and sputum, shortness of breath, wheezing  Gastrointestinal: Nausea, vomiting, diarrhea, constipation, pain, blood in stool, or vomitus, heartburn, difficulty swallowing  Genitourinary: incontinence, abnormal bleeding, abnormal discharge, urinary frequency, urinary hesitancy, pain, impotence sexual problem, infection, urinary retention  Musculoskeletal: Pain, stiffness, joint, redness or warmth, arthritis, back pain, weakness, muscle wasting, sprain or fracture  Neuro: Weight weakness, dizziness, change in voice, change in taste change in vision, change in hearing, loss, or change of sensation, trouble walking, balance problems coordination problems, shaking, speech problem  Endocrine , cold or heat intolerance, blood sugar problem, weight gain or loss missed periods hot flashes, sweats, change in body hair, change in libido, increased thirst, increased  "urination  Heme/lymph: Swelling, bleeding, problem anemia, bruising, enlarged lymph nodes  Allergic/immunologic: H. plus nasal drip, watery itchy eyes, nasal drainage, immunosuppressed  The above were reviewed and noted negative except as noted in HPI and Problem List.      Objective   /70   Pulse 67   Resp 16   Ht 1.626 m (5' 4\")   Wt 55.3 kg (122 lb)   SpO2 95%   BMI 20.94 kg/m²     Physical Exam  Constitutional: Well developed, well nourished, alert and in no acute distress   Eyes: Normal external exam. Pupils equally round and reactive to light with normal accommodation and extraocular movements intact.  Neck: Supple, no lymphadenopathy or masses.   Cardiovascular: Regular rate and rhythm, normal S1 and S2, no murmurs, gallops, or rubs. Radial pulses normal. No peripheral edema.  Pulmonary: No respiratory distress, lungs clear to auscultation bilaterally. No wheezes, rhonchi, rales.  Abdomen: soft,non tender, non distended, without masses or HSM  Skin: Warm, well perfused, normal skin turgor and color.   Neurologic: Cranial nerves II-XII grossly intact.   Psychiatric: Mood calm and affect normal  Musculoskeletal: Moving all extremities without restriction    Assessment/Plan   Problem List Items Addressed This Visit          Circulatory    HTN (hypertension)    Relevant Orders    Follow Up In [facility] - PCP       Other    Allergic rhinitis due to pollen    Relevant Medications    triamcinolone acetonide (Kenalog-40) injection 80 mg (Start on 5/22/2023  2:15 PM)    Dyslipidemia    Relevant Orders    Follow Up In [facility] - PCP     Other Visit Diagnoses       Sinusitis, unspecified chronicity, unspecified location        Relevant Medications    cefdinir (Omnicef) 300 mg capsule    Other Relevant Orders    Follow Up In [facility] - PCP                 Continue current medications and therapy for chronic medical conditions    Provider Attestation - Scribe " documentation    All medical record entries made by the Scribe were at my direction and personally dictated by me. I have reviewed the chart and agree that the record accurately reflects my personal performance of the history, physical exam, discussion and plan.    Scribe Attestation  By signing my name below, I, Solitario Polo MD   , Scribe   attest that this documentation has been prepared under the direction and in the presence of Solitario Polo MD.    Continue zyrtec

## 2023-05-30 ENCOUNTER — OFFICE VISIT (OUTPATIENT)
Dept: PRIMARY CARE | Facility: CLINIC | Age: 68
End: 2023-05-30
Payer: MEDICARE

## 2023-05-30 VITALS
HEART RATE: 73 BPM | OXYGEN SATURATION: 100 % | DIASTOLIC BLOOD PRESSURE: 68 MMHG | RESPIRATION RATE: 16 BRPM | SYSTOLIC BLOOD PRESSURE: 118 MMHG | WEIGHT: 120 LBS | BODY MASS INDEX: 20.6 KG/M2

## 2023-05-30 DIAGNOSIS — J30.1 SEASONAL ALLERGIC RHINITIS DUE TO POLLEN: ICD-10-CM

## 2023-05-30 DIAGNOSIS — F41.1 GENERALIZED ANXIETY DISORDER: ICD-10-CM

## 2023-05-30 DIAGNOSIS — J32.9 CHRONIC SINUSITIS, UNSPECIFIED LOCATION: Primary | ICD-10-CM

## 2023-05-30 DIAGNOSIS — T78.40XS ALLERGY, SEQUELA: ICD-10-CM

## 2023-05-30 DIAGNOSIS — I10 PRIMARY HYPERTENSION: ICD-10-CM

## 2023-05-30 PROCEDURE — 99214 OFFICE O/P EST MOD 30 MIN: CPT | Performed by: FAMILY MEDICINE

## 2023-05-30 RX ORDER — FLUTICASONE FUROATE 27.5 UG/1
1 SPRAY, METERED NASAL
Qty: 10 G | Refills: 5 | Status: SHIPPED | OUTPATIENT
Start: 2023-05-30 | End: 2024-05-29

## 2023-05-30 ASSESSMENT — ENCOUNTER SYMPTOMS
ABDOMINAL PAIN: 0
PHOTOPHOBIA: 0
SPEECH DIFFICULTY: 0
CONSTIPATION: 0
DIARRHEA: 0
HEADACHES: 0
NERVOUS/ANXIOUS: 0
SHORTNESS OF BREATH: 0
APPETITE CHANGE: 0
FATIGUE: 0
SINUS PAIN: 0
COUGH: 0
FEVER: 0
DECREASED CONCENTRATION: 0
NECK STIFFNESS: 0
RECTAL PAIN: 0
ARTHRALGIAS: 0
FLANK PAIN: 0
DYSPHORIC MOOD: 0
EYE PAIN: 0
ADENOPATHY: 0
CONFUSION: 0
SEIZURES: 0
ABDOMINAL DISTENTION: 0
SINUS PRESSURE: 0
CHEST TIGHTNESS: 0
SLEEP DISTURBANCE: 0
MYALGIAS: 0
SORE THROAT: 0
RHINORRHEA: 0
HEMATURIA: 0
TROUBLE SWALLOWING: 0
DYSURIA: 0
ACTIVITY CHANGE: 0
AGITATION: 0
PALPITATIONS: 0
COLOR CHANGE: 0
STRIDOR: 0
POLYDIPSIA: 0
POLYPHAGIA: 0
CONSTITUTIONAL NEGATIVE: 1
BLOOD IN STOOL: 0
DIZZINESS: 0

## 2023-05-30 NOTE — PROGRESS NOTES
Subjective   Patient ID: Tonie Polanco is a 67 y.o. female who presents for Sinusitis, Cough, Nasal Congestion, and loss of smell.    Sinusitis  Pertinent negatives include no congestion, coughing, ear pain, headaches, shortness of breath, sinus pressure or sore throat.        Patient is here to discuss continued sinus infections, continued productive cough but mostly dry, nasal congestion, lost smell sensation and spontaneous sweating.   She is on Cefdinir still and has been on antibiotics for the last 6 weeks. She had the allergy shot on 5/21/23 and takes zyrtec daily.     Review of Systems   Constitutional: Negative.  Negative for activity change, appetite change, fatigue and fever.   HENT:  Negative for congestion, dental problem, ear discharge, ear pain, mouth sores, rhinorrhea, sinus pressure, sinus pain, sore throat, tinnitus and trouble swallowing.    Eyes:  Negative for photophobia, pain and visual disturbance.   Respiratory:  Negative for cough, chest tightness, shortness of breath and stridor.    Cardiovascular:  Negative for chest pain and palpitations.   Gastrointestinal:  Negative for abdominal distention, abdominal pain, blood in stool, constipation, diarrhea and rectal pain.   Endocrine: Negative for cold intolerance, heat intolerance, polydipsia, polyphagia and polyuria.   Genitourinary:  Negative for dysuria, flank pain, hematuria and urgency.   Musculoskeletal:  Negative for arthralgias, gait problem, myalgias and neck stiffness.   Skin:  Negative for color change and rash.   Allergic/Immunologic: Negative for environmental allergies and food allergies.   Neurological:  Negative for dizziness, seizures, syncope, speech difficulty and headaches.   Hematological:  Negative for adenopathy.   Psychiatric/Behavioral:  Negative for agitation, confusion, decreased concentration, dysphoric mood and sleep disturbance. The patient is not nervous/anxious.        Objective   /68 (BP Location: Right  arm, Patient Position: Sitting, BP Cuff Size: Adult)   Pulse 73   Resp 16   Wt 54.4 kg (120 lb)   SpO2 100%   BMI 20.60 kg/m²     Physical Exam  Vitals reviewed.   Constitutional:       General: She is not in acute distress.     Appearance: Normal appearance. She is normal weight. She is not ill-appearing or diaphoretic.   HENT:      Head: Normocephalic.      Right Ear: Tympanic membrane and external ear normal.      Left Ear: Tympanic membrane and external ear normal.      Nose: Nose normal. No congestion.      Mouth/Throat:      Mouth: Mucous membranes are dry.      Pharynx: No posterior oropharyngeal erythema.   Eyes:      General:         Right eye: No discharge.         Left eye: No discharge.      Extraocular Movements: Extraocular movements intact.      Conjunctiva/sclera: Conjunctivae normal.      Pupils: Pupils are equal, round, and reactive to light.   Cardiovascular:      Rate and Rhythm: Normal rate and regular rhythm.      Pulses: Normal pulses.      Heart sounds: Normal heart sounds. No murmur heard.  Pulmonary:      Effort: Pulmonary effort is normal. No respiratory distress.      Breath sounds: Normal breath sounds. No wheezing or rales.   Chest:      Chest wall: No tenderness.   Abdominal:      General: Abdomen is flat. Bowel sounds are normal. There is no distension.      Palpations: There is no mass.      Tenderness: There is no abdominal tenderness. There is no guarding.   Genitourinary:     Rectum: Normal.   Musculoskeletal:         General: No tenderness. Normal range of motion.      Cervical back: Normal range of motion and neck supple. No tenderness.      Right lower leg: No edema.      Left lower leg: No edema.   Skin:     General: Skin is warm and dry.      Coloration: Skin is not jaundiced.      Findings: No bruising or erythema.   Neurological:      General: No focal deficit present.      Mental Status: She is alert and oriented to person, place, and time. Mental status is at  baseline.      Cranial Nerves: No cranial nerve deficit.      Sensory: No sensory deficit.      Coordination: Coordination normal.      Gait: Gait normal.   Psychiatric:         Mood and Affect: Mood normal.         Thought Content: Thought content normal.         Judgment: Judgment normal.         Assessment/Plan   Problem List Items Addressed This Visit          Circulatory    HTN (hypertension)       Other    Allergic rhinitis due to pollen    Generalized anxiety disorder     Other Visit Diagnoses       Chronic sinusitis, unspecified location    -  Primary    Relevant Medications    fluticasone (Flonase Sensimist) 27.5 mcg/actuation nasal spray    Other Relevant Orders    CT sinus wo IV contrast    Referral to ENT    Allergy, sequela             Scribe Attestation  By signing my name below, I, Pebbles LOMELI , Scribe   attest that this documentation has been prepared under the direction and in the presence of Mathew Batista DO.  Provider Attestation - Scribe documentation  All medical record entries made by the Scribe were at my direction and personally dictated by me. I have reviewed the chart and agree that the record accurately reflects my personal performance of the history, physical exam, discussion and plan.

## 2023-05-30 NOTE — PATIENT INSTRUCTIONS
Follow up 5 days post CT scan of sinuses    Continue current medications and therapy for chronic medical conditions.    Patient was advised importance of proper diet/nutrition in addition adequate hydration. Patient was encouraged moderate exercise program to include 30 minutes daily for 5 days of the week or 150 minutes weekly. Patient will follow-up with us as scheduled.    ENT referral ordered    Complete antibiotic therapy/Omnicef    CT scan of sinuses    Start fluticasone Sensimist 1 spray intranasally twice daily    CT sinus ordered     Use Flonase Sensimist

## 2023-06-07 ENCOUNTER — OFFICE VISIT (OUTPATIENT)
Dept: PRIMARY CARE | Facility: CLINIC | Age: 68
End: 2023-06-07
Payer: MEDICARE

## 2023-06-07 ENCOUNTER — APPOINTMENT (OUTPATIENT)
Dept: PRIMARY CARE | Facility: CLINIC | Age: 68
End: 2023-06-07
Payer: MEDICARE

## 2023-06-07 VITALS
HEIGHT: 64 IN | BODY MASS INDEX: 21.24 KG/M2 | SYSTOLIC BLOOD PRESSURE: 104 MMHG | WEIGHT: 124.4 LBS | OXYGEN SATURATION: 97 % | TEMPERATURE: 97.8 F | HEART RATE: 75 BPM | RESPIRATION RATE: 16 BRPM | DIASTOLIC BLOOD PRESSURE: 64 MMHG

## 2023-06-07 DIAGNOSIS — M00.9: ICD-10-CM

## 2023-06-07 DIAGNOSIS — M25.429 REDNESS AND SWELLING OF ELBOW: ICD-10-CM

## 2023-06-07 DIAGNOSIS — R23.8 REDNESS AND SWELLING OF ELBOW: ICD-10-CM

## 2023-06-07 DIAGNOSIS — L03.114 CELLULITIS OF LEFT ELBOW: Primary | ICD-10-CM

## 2023-06-07 PROCEDURE — 99213 OFFICE O/P EST LOW 20 MIN: CPT | Performed by: NURSE PRACTITIONER

## 2023-06-07 RX ORDER — DOXYCYCLINE 100 MG/1
100 CAPSULE ORAL 2 TIMES DAILY
Qty: 20 CAPSULE | Refills: 0 | Status: SHIPPED | OUTPATIENT
Start: 2023-06-07 | End: 2023-06-17

## 2023-06-07 ASSESSMENT — ENCOUNTER SYMPTOMS
OCCASIONAL FEELINGS OF UNSTEADINESS: 0
LOSS OF SENSATION IN FEET: 0
DEPRESSION: 0

## 2023-06-07 ASSESSMENT — PAIN SCALES - GENERAL: PAINLEVEL: 0-NO PAIN

## 2023-06-07 NOTE — PROGRESS NOTES
Subjective   Patient ID: Tonie Polanco is a 67 y.o. female who is with complaint of redness, pain, tenderness, and swelling of the left elbow.    HPI  Patient is a 67 y.o. female who CONSULTED AT [facility] today. Patient is with complaints of redness, pain, tenderness, and swelling of the left elbow. She states that she woke up this morning with the symptoms. She denies any injuries, insect bites or itching. She denies paralysis, paresthesia fever, chills, nor changes in the color of the nail bed nor skin on the tip of finger of involved extremity. Patient has not taken any medication for relief of symptoms.     Review of Systems  General: no weight loss, generally healthy, no fatigue  Head:  no headaches / sinus pain, no vertigo, no injury  Eyes: no diplopia, no tearing, no pain,   Ears: no change in hearing, no tinnitus, no bleeding, no vertigo  Mouth:  no dental difficulties, no gingival bleeding, no sore throat, no loss of sense of taste  Nose: no congestion, no  discharge, no bleeding, no obstruction, no loss of sense of smell  Neck: no stiffness, no pain, no tenderness, no masses, no bruit  Pulmonary: no dyspnea, no wheezing, no hemoptysis, no cough  Cardiovascular: no chest pain, no palpitations, no syncope, no orthopnea  Gastrointestinal: no change in appetite, no dysphagia, no abdominal pains, no diarrhea, no emesis, no melena  Genito Urinary: no dysuria, no urinary urgency, no nocturia, no incontinence, no change in nature of urine  Musculoskeletal: no muscle ache,  no limitation of range of motion, no paresthesia, no numbness  Skin: (+) redness, pain, tenderness and swelling on the left elbow,   Constitutional: no fever, no chills, no night sweats    Objective   Physical Exam  General: ambulatory, in no acute distress  Musculoskeletal: no limitation of range of motion, no paralysis, no deformity  Extremities: full and equal peripheral pulses, no edema, capillary refill is < 2  seconds on all fingers   toes  Skin: (+) erythema, swelling and tenderness on the left elbow,   Assessment/Plan   Problem List Items Addressed This Visit    None  Visit Diagnoses       Cellulitis of left elbow    -  Primary    Relevant Medications    doxycycline (Vibramycin) 100 mg capsule    Redness and swelling of elbow        Relevant Medications    doxycycline (Vibramycin) 100 mg capsule    Infection of left elbow (CMS/HCC)        Relevant Medications    doxycycline (Vibramycin) 100 mg capsule        DISCHARGE SUMMARY:   Patient seen and examined. Diagnosis, differential diagnosis, treatment options, and probable complications discussed and explained to patient. Patient to take medication/s associated with today's consultation. Patient may also take OTC analgesic for pain if needed. Patient advised on daily wound cleaning and care. Patient was advised to come back if there is worsening or persistent symptoms.     Patient advised to follow up in 5- 7 days. Patient verbalized understanding about plan of care.

## 2023-06-07 NOTE — PROGRESS NOTES
Subjective   Patient ID: Tonie Polanco is a 67 y.o. female who presents for elbow    HPI Patient states woke up with left elbow swollen red and warm to the touch.    Review of Systems    Objective   There were no vitals taken for this visit.    Physical Exam    Assessment/Plan

## 2023-06-08 NOTE — PATIENT INSTRUCTIONS
DISCHARGE SUMMARY:   Patient seen and examined. Diagnosis, differential diagnosis, treatment options, and probable complications discussed and explained to patient. Patient to take medication/s associated with today's consultation. Patient may also take OTC analgesic for pain if needed. Patient advised on daily wound cleaning and care. Patient was advised to come back if there is worsening or persistent symptoms.     Patient advised to follow up in 5- 7 days. Patient verbalized understanding about plan of care.

## 2023-06-13 ENCOUNTER — OFFICE VISIT (OUTPATIENT)
Dept: PRIMARY CARE | Facility: CLINIC | Age: 68
End: 2023-06-13
Payer: MEDICARE

## 2023-06-13 VITALS
HEART RATE: 73 BPM | OXYGEN SATURATION: 97 % | BODY MASS INDEX: 20.73 KG/M2 | WEIGHT: 121.4 LBS | SYSTOLIC BLOOD PRESSURE: 114 MMHG | DIASTOLIC BLOOD PRESSURE: 60 MMHG | HEIGHT: 64 IN

## 2023-06-13 DIAGNOSIS — J32.9 CHRONIC SINUSITIS, UNSPECIFIED LOCATION: ICD-10-CM

## 2023-06-13 DIAGNOSIS — Z71.2 ENCOUNTER TO DISCUSS TEST RESULTS: Primary | ICD-10-CM

## 2023-06-13 PROBLEM — I65.29 CAROTID STENOSIS: Status: ACTIVE | Noted: 2023-06-13

## 2023-06-13 PROBLEM — I49.9 ARRHYTHMIA: Status: ACTIVE | Noted: 2023-06-13

## 2023-06-13 PROBLEM — G45.9 TIA (TRANSIENT ISCHEMIC ATTACK): Status: ACTIVE | Noted: 2023-06-13

## 2023-06-13 PROCEDURE — 99213 OFFICE O/P EST LOW 20 MIN: CPT | Performed by: FAMILY MEDICINE

## 2023-06-13 ASSESSMENT — ENCOUNTER SYMPTOMS
TROUBLE SWALLOWING: 0
NECK STIFFNESS: 0
SINUS PRESSURE: 0
DECREASED CONCENTRATION: 0
CONSTITUTIONAL NEGATIVE: 1
EYE PAIN: 0
ABDOMINAL PAIN: 0
COLOR CHANGE: 0
MYALGIAS: 0
SPEECH DIFFICULTY: 0
STRIDOR: 0
NERVOUS/ANXIOUS: 0
BLOOD IN STOOL: 0
FEVER: 0
SHORTNESS OF BREATH: 0
DIARRHEA: 0
SEIZURES: 0
DIZZINESS: 0
CHEST TIGHTNESS: 0
DYSPHORIC MOOD: 0
RECTAL PAIN: 0
APPETITE CHANGE: 0
DYSURIA: 0
PALPITATIONS: 0
SLEEP DISTURBANCE: 0
ARTHRALGIAS: 0
FATIGUE: 0
ABDOMINAL DISTENTION: 0
ADENOPATHY: 0
AGITATION: 0
HEADACHES: 0
POLYPHAGIA: 0
CONSTIPATION: 0
POLYDIPSIA: 0
ACTIVITY CHANGE: 0
COUGH: 0
HEMATURIA: 0
SORE THROAT: 0
CONFUSION: 0
RHINORRHEA: 0
PHOTOPHOBIA: 0
SINUS PAIN: 0
FLANK PAIN: 0

## 2023-06-13 ASSESSMENT — PATIENT HEALTH QUESTIONNAIRE - PHQ9
SUM OF ALL RESPONSES TO PHQ9 QUESTIONS 1 AND 2: 0
1. LITTLE INTEREST OR PLEASURE IN DOING THINGS: NOT AT ALL
2. FEELING DOWN, DEPRESSED OR HOPELESS: NOT AT ALL

## 2023-06-13 NOTE — PROGRESS NOTES
"Subjective   Patient ID: Tonie Polanco is a 67 y.o. female who presents for Follow-up (CT scan results ).    HPI   Patient presents today following up after having CT and would like to review the results.     Review of Systems   Constitutional: Negative.  Negative for activity change, appetite change, fatigue and fever.   HENT:  Negative for congestion, dental problem, ear discharge, ear pain, mouth sores, rhinorrhea, sinus pressure, sinus pain, sore throat, tinnitus and trouble swallowing.    Eyes:  Negative for photophobia, pain and visual disturbance.   Respiratory:  Negative for cough, chest tightness, shortness of breath and stridor.    Cardiovascular:  Negative for chest pain and palpitations.   Gastrointestinal:  Negative for abdominal distention, abdominal pain, blood in stool, constipation, diarrhea and rectal pain.   Endocrine: Negative for cold intolerance, heat intolerance, polydipsia, polyphagia and polyuria.   Genitourinary:  Negative for dysuria, flank pain, hematuria and urgency.   Musculoskeletal:  Negative for arthralgias, gait problem, myalgias and neck stiffness.   Skin:  Negative for color change and rash.   Allergic/Immunologic: Negative for environmental allergies and food allergies.   Neurological:  Negative for dizziness, seizures, syncope, speech difficulty and headaches.   Hematological:  Negative for adenopathy.   Psychiatric/Behavioral:  Negative for agitation, confusion, decreased concentration, dysphoric mood and sleep disturbance. The patient is not nervous/anxious.        Objective   /60   Pulse 73   Ht 1.626 m (5' 4\")   Wt 55.1 kg (121 lb 6.4 oz)   SpO2 97%   BMI 20.84 kg/m²     Physical Exam  Vitals reviewed.   Constitutional:       General: She is not in acute distress.     Appearance: Normal appearance. She is normal weight. She is not ill-appearing or diaphoretic.   HENT:      Head: Normocephalic.      Right Ear: Tympanic membrane and external ear normal.      Left " Ear: Tympanic membrane and external ear normal.      Nose: Nose normal. No congestion.      Mouth/Throat:      Mouth: Mucous membranes are dry.      Pharynx: No posterior oropharyngeal erythema.   Eyes:      General:         Right eye: No discharge.         Left eye: No discharge.      Extraocular Movements: Extraocular movements intact.      Conjunctiva/sclera: Conjunctivae normal.      Pupils: Pupils are equal, round, and reactive to light.   Cardiovascular:      Rate and Rhythm: Normal rate and regular rhythm.      Pulses: Normal pulses.      Heart sounds: Normal heart sounds. No murmur heard.  Pulmonary:      Effort: Pulmonary effort is normal. No respiratory distress.      Breath sounds: Normal breath sounds. No wheezing or rales.   Chest:      Chest wall: No tenderness.   Abdominal:      General: Abdomen is flat. Bowel sounds are normal. There is no distension.      Palpations: There is no mass.      Tenderness: There is no abdominal tenderness. There is no guarding.   Genitourinary:     Rectum: Normal.   Musculoskeletal:         General: No tenderness. Normal range of motion.      Cervical back: Normal range of motion and neck supple. No tenderness.      Right lower leg: No edema.      Left lower leg: No edema.   Skin:     General: Skin is warm and dry.      Coloration: Skin is not jaundiced.      Findings: No bruising or erythema.   Neurological:      General: No focal deficit present.      Mental Status: She is alert and oriented to person, place, and time. Mental status is at baseline.      Cranial Nerves: No cranial nerve deficit.      Sensory: No sensory deficit.      Coordination: Coordination normal.      Gait: Gait normal.   Psychiatric:         Mood and Affect: Mood normal.         Thought Content: Thought content normal.         Judgment: Judgment normal.         Assessment/Plan   Problem List Items Addressed This Visit          Infectious/Inflammatory    Chronic sinusitis     Other Visit Diagnoses        Encounter to discuss test results    -  Primary              Scribe Attestation  By signing my name below, I, ARMANI Ribeiro , [place]   attest that this documentation has been prepared under the direction and in the presence of Mathew Batista DO.   Provider Attestation - Scribe documentation    All medical record entries made by the Scribe were at my direction and personally dictated by me. I have reviewed the chart and agree that the record accurately reflects my personal performance of the history, physical exam, discussion and plan.

## 2023-06-13 NOTE — PATIENT INSTRUCTIONS
Follow up in    Continue current medications and therapy for chronic medical conditions.    Patient was advised importance of proper diet/nutrition in addition adequate hydration. Patient was encouraged moderate exercise program to include 30 minutes daily for 5 days of the week or 150 minutes weekly. Patient will follow-up with us as scheduled.    REVIEWED CT OF SINUS FROM 05/30/2023

## 2023-06-22 LAB
IGA (MG/DL) IN SER/PLAS: 180 MG/DL (ref 70–400)
IGG (MG/DL) IN SER/PLAS: 1020 MG/DL (ref 700–1600)
IGG (MG/DL) IN SER/PLAS: 1020 MG/DL (ref 700–1600)
IGG SUBCLASS 1 (MG/DL) IN SERUM: 679 MG/DL (ref 490–1140)
IGG SUBCLASS 2 (MG/DL) IN SERUM: 375 MG/DL (ref 150–640)
IGG SUBCLASS 3 (MG/DL) IN SERUM: 31 MG/DL (ref 11–85)
IGG SUBCLASS 4 (MG/DL) IN SERUM: 63 MG/DL (ref 3–200)
IGM (MG/DL) IN SER/PLAS: 235 MG/DL (ref 40–230)

## 2023-06-26 LAB
COMPLEMENT TOTAL (CH50): 66.7 U/ML (ref 38.7–89.9)
DIPHTHERIA ANTIBODY: 0.3 IU/ML
HAEMOPHILUS INFLUENZAE B AB IGG: 0 UG/ML
PNEUMO SEROTYPE INTERPRETATION: NORMAL
SEROTYPE 1, VIRC: >12.18 UG/ML
SEROTYPE 10A(34), VIRC: 9.86 UG/ML
SEROTYPE 11A(43), VIRC: 22.79 UG/ML
SEROTYPE 12F, VIRC: 2.69 UG/ML
SEROTYPE 14, VIRC: 6.38 UG/ML
SEROTYPE 15B(54), VIRC: 6.95 UG/ML
SEROTYPE 17F, VIRC: 1.89 UG/ML
SEROTYPE 18C(56), VIRC: 20.05 UG/ML
SEROTYPE 19A(57), VIRC: 12.63 UG/ML
SEROTYPE 19F, VIRC: 13.33 UG/ML
SEROTYPE 2, VIRC: 6.8 UG/ML
SEROTYPE 20, VIRC: 20.67 UG/ML
SEROTYPE 22F, VIRC: 6.03 UG/ML
SEROTYPE 23F, VIRC: 8.7 UG/ML
SEROTYPE 3, VIRC: >19.53 UG/ML
SEROTYPE 33F(70), VIRC: 3.27 UG/ML
SEROTYPE 4, VIRC: 9.38 UG/ML
SEROTYPE 5, VIRC: 17.09 UG/ML
SEROTYPE 6B(26), VIRC: 13.34 UG/ML
SEROTYPE 7F(51), VIRC: 19.26 UG/ML
SEROTYPE 8, VIRC: 11.3 UG/ML
SEROTYPE 9N, VIRC: 7.8 UG/ML
SEROTYPE 9V(68), VIRC: 7.7 UG/ML
TETANUS AB IGG: 4.2 IU/ML

## 2023-07-17 ENCOUNTER — APPOINTMENT (OUTPATIENT)
Dept: PRIMARY CARE | Facility: CLINIC | Age: 68
End: 2023-07-17
Payer: MEDICARE

## 2023-08-02 ENCOUNTER — OFFICE VISIT (OUTPATIENT)
Dept: PRIMARY CARE | Facility: CLINIC | Age: 68
End: 2023-08-02
Payer: MEDICARE

## 2023-08-02 VITALS
HEART RATE: 77 BPM | OXYGEN SATURATION: 98 % | RESPIRATION RATE: 14 BRPM | HEIGHT: 64 IN | SYSTOLIC BLOOD PRESSURE: 110 MMHG | DIASTOLIC BLOOD PRESSURE: 62 MMHG | BODY MASS INDEX: 20.32 KG/M2 | WEIGHT: 119 LBS

## 2023-08-02 DIAGNOSIS — J32.9 CHRONIC SINUSITIS, UNSPECIFIED LOCATION: ICD-10-CM

## 2023-08-02 DIAGNOSIS — I10 PRIMARY HYPERTENSION: ICD-10-CM

## 2023-08-02 DIAGNOSIS — E78.5 DYSLIPIDEMIA: Primary | ICD-10-CM

## 2023-08-02 PROBLEM — J30.1 ALLERGIC RHINITIS DUE TO POLLEN: Status: RESOLVED | Noted: 2023-03-20 | Resolved: 2023-08-02

## 2023-08-02 PROCEDURE — 99214 OFFICE O/P EST MOD 30 MIN: CPT | Performed by: FAMILY MEDICINE

## 2023-08-02 RX ORDER — CEFUROXIME AXETIL 500 MG/1
500 TABLET ORAL 2 TIMES DAILY
Qty: 20 TABLET | Refills: 0 | Status: SHIPPED | OUTPATIENT
Start: 2023-08-02 | End: 2023-08-12

## 2023-08-02 ASSESSMENT — ENCOUNTER SYMPTOMS
EYES NEGATIVE: 1
RESPIRATORY NEGATIVE: 1
CONSTITUTIONAL NEGATIVE: 1
CARDIOVASCULAR NEGATIVE: 1
RHINORRHEA: 1
HEMATOLOGIC/LYMPHATIC NEGATIVE: 1
ENDOCRINE NEGATIVE: 1
MUSCULOSKELETAL NEGATIVE: 1
ALLERGIC/IMMUNOLOGIC NEGATIVE: 1
HEADACHES: 1
GASTROINTESTINAL NEGATIVE: 1
SINUS PRESSURE: 1
PSYCHIATRIC NEGATIVE: 1

## 2023-08-02 NOTE — PROGRESS NOTES
"Subjective   Patient ID: Tonie Polanco is a 68 y.o. female who presents for Hypertension and Sinusitis.    HPI  Pt presents for HTN and states her BP is running high at times, she states it has dropped pretty low and she stopped taking it, but has started it back up.     Pt states she has sinus pressure and a headache and would like to discuss this today.Pt states her left ear is feeling full and feels pressure in her ear. Pt has not tried anything OTC.    Review of Systems   Constitutional: Negative.    HENT:  Positive for congestion, rhinorrhea and sinus pressure.    Eyes: Negative.    Respiratory: Negative.     Cardiovascular: Negative.    Gastrointestinal: Negative.    Endocrine: Negative.    Genitourinary: Negative.    Musculoskeletal: Negative.    Skin: Negative.    Allergic/Immunologic: Negative.    Neurological:  Positive for headaches.   Hematological: Negative.    Psychiatric/Behavioral: Negative.         Objective   /62 (BP Location: Left arm, Patient Position: Sitting, BP Cuff Size: Adult)   Pulse 77   Resp 14   Ht 1.626 m (5' 4\")   Wt 54 kg (119 lb)   SpO2 98%   BMI 20.43 kg/m²     Physical Exam CONSTITUTIONAL- NAD, Pt is A & O x3, Vital signs reviewed per chart.  General Appearance- normal , good hygiene,talks easily  EYES-PERRLA conjunctiva and lids- normal  EARS/NOSE-TM's normal, head normocephalic and atraumatic, naso pharynx-no nasal discharge, no trismus,  oropharynx- normal without exudate  NECK- supple, FROM, without mass  thyroid- NT, normal size, no nodule noted  LYMPH- WNL  CV- RRR without murmur, gallop, or other abnormality.  PULM- CTA bilaterally  Respiratory effort- normal respiratory effort , no retractions or nasal flaring  ABDOMEN- normoactive BS's , soft , NT, no hepatosplenomegaly palpated, or masses. No pulsatile masses noted  extremities no edema,NT  SKIN- no abnormal skin lesions on inspection or palpation  neuro- no focal deficits  PSYCH- pleasant, normal judgement " and insight     Assessment/Plan   Problem List Items Addressed This Visit       Dyslipidemia - Primary    HTN (hypertension)     Other Visit Diagnoses       Chronic sinusitis, unspecified location        Relevant Medications    cefuroxime (Ceftin) 500 mg tablet             Scribe Attestation  By signing my name below, I, Vimal Roblero DO  , Scribe   attest that this documentation has been prepared under the direction and in the presence of Vimal Roblero DO.

## 2023-08-16 ENCOUNTER — OFFICE VISIT (OUTPATIENT)
Dept: PRIMARY CARE | Facility: CLINIC | Age: 68
End: 2023-08-16
Payer: MEDICARE

## 2023-08-16 VITALS
SYSTOLIC BLOOD PRESSURE: 110 MMHG | BODY MASS INDEX: 20.66 KG/M2 | WEIGHT: 121 LBS | DIASTOLIC BLOOD PRESSURE: 72 MMHG | HEIGHT: 64 IN | RESPIRATION RATE: 14 BRPM | HEART RATE: 78 BPM | OXYGEN SATURATION: 98 %

## 2023-08-16 DIAGNOSIS — E78.5 DYSLIPIDEMIA: ICD-10-CM

## 2023-08-16 DIAGNOSIS — I10 PRIMARY HYPERTENSION: ICD-10-CM

## 2023-08-16 DIAGNOSIS — L03.114 CELLULITIS OF LEFT UPPER EXTREMITY: Primary | ICD-10-CM

## 2023-08-16 PROCEDURE — 99214 OFFICE O/P EST MOD 30 MIN: CPT | Performed by: FAMILY MEDICINE

## 2023-08-16 RX ORDER — SERTRALINE HYDROCHLORIDE 25 MG/1
25 TABLET, FILM COATED ORAL DAILY
COMMUNITY
Start: 2022-09-10 | End: 2023-11-10 | Stop reason: ALTCHOICE

## 2023-08-16 RX ORDER — CEPHALEXIN 500 MG/1
500 CAPSULE ORAL 3 TIMES DAILY
Qty: 42 CAPSULE | Refills: 0 | Status: SHIPPED | OUTPATIENT
Start: 2023-08-16 | End: 2023-08-30

## 2023-08-16 NOTE — PROGRESS NOTES
Subjective   Patient ID: Tonie Polanco is a 68 y.o. female who presents for Joint Swelling.    HPI Pt is here for left elbow swelling. she saw Pedrito in our clinic in June 7th for the same thing.Pt was put on a antibiotic.  Pt states onset was 08/14/23 and states it is warm to the touch. She states that she bumped it.     12 Systems have been reviewed as follows.  Constitutional: Fever, weight gain, weight loss, appetite change, night sweats, fatigue, chills.  Eyes : blurry, double vision, vision, loss, tearing, redness, pain, sensitivity to light, glaucoma.  Ears: nose, mouth, and throat: Hearing loss, ringing in the ears, ear pain, nasal congestion, nasal drainage, nosebleeds, mouth, throat, irritation tooth problem.  Cardiovascular: chest pain, pressure, heart racing, palpitations, sweating, leg swelling, high or low blood pressure  Pulmonary: Cough, yellow or green sputum, blood and sputum, shortness of breath, wheezing  Gastrointestinal: Nausea, vomiting, diarrhea, constipation, pain, blood in stool, or vomitus, heartburn, difficulty swallowing  Genitourinary: incontinence, abnormal bleeding, abnormal discharge, urinary frequency, urinary hesitancy, pain, impotence sexual problem, infection, urinary retention  Musculoskeletal: Pain, stiffness, joint, redness or warmth, arthritis, back pain, weakness, muscle wasting, sprain or fracture  Neuro: Weight weakness, dizziness, change in voice, change in taste change in vision, change in hearing, loss, or change of sensation, trouble walking, balance problems coordination problems, shaking, speech problem  Endocrine: cold or heat intolerance, blood sugar problem, weight gain or loss missed periods hot flashes, sweats, change in body hair, change in libido, increased thirst, increased urination  Heme/lymph: Swelling, bleeding, problem anemia, bruising, enlarged lymph nodes  Allergic/immunologic: H. plus nasal drip, watery itchy eyes, nasal drainage,  "immunosuppressed  The above were reviewed and noted negative except as noted in HPI and Problem List.      Objective   /72 (BP Location: Left arm, Patient Position: Sitting, BP Cuff Size: Adult)   Pulse 78   Resp 14   Ht 1.626 m (5' 4\")   Wt 54.9 kg (121 lb)   SpO2 98%   BMI 20.77 kg/m²     Physical Exam CONSTITUTIONAL- NAD, Pt is A & O x3, Vital signs reviewed per chart.  General Appearance- normal , good hygiene,talks easily  EYES-PERRLA conjunctiva and lids- normal  EARS/NOSE-TM's normal, head normocephalic and atraumatic, naso pharynx-no nasal discharge, no trismus,  oropharynx- normal without exudate  NECK- supple, FROM, without mass  thyroid- NT, normal size, no nodule noted  LYMPH- WNL  CV- RRR without murmur, gallop, or other abnormality.  PULM- CTA bilaterally  Respiratory effort- normal respiratory effort , no retractions or nasal flaring  ABDOMEN- normoactive BS's , soft , NT, no hepatosplenomegaly palpated, or masses. No pulsatile masses noted  extremities no edema,NT  SKIN-left elbow specifically the area of the olecranon shows a central abrasion and erythema extending out approximately 3 cm radius from the abrasion.  Negative epitrochlear adenopathy or axillary adenopathy.  Full range of motion and no palpable bursal inflammation at this point.  neuro- no focal deficits  PSYCH- pleasant, normal judgement and insight     Assessment/Plan   Problem List Items Addressed This Visit       Dyslipidemia    HTN (hypertension)     Other Visit Diagnoses       Cellulitis of left upper extremity    -  Primary    Relevant Medications    cephalexin (Keflex) 500 mg capsule             Scribe Attestation  By signing my name below, I, Vimal Roblero DO  , Scribe   attest that this documentation has been prepared under the direction and in the presence of Vimal Roblero DO.  "

## 2023-08-19 PROBLEM — U07.1 COVID: Status: ACTIVE | Noted: 2023-08-19

## 2023-08-19 PROBLEM — N95.1 VASOMOTOR SYMPTOMS DUE TO MENOPAUSE: Status: ACTIVE | Noted: 2023-08-19

## 2023-08-19 PROBLEM — M70.21 OLECRANON BURSITIS OF RIGHT ELBOW: Status: ACTIVE | Noted: 2023-08-19

## 2023-08-19 PROBLEM — H54.62 VISION LOSS OF LEFT EYE: Status: ACTIVE | Noted: 2023-08-19

## 2023-08-19 PROBLEM — M81.0 OSTEOPOROSIS: Status: ACTIVE | Noted: 2023-08-19

## 2023-08-19 RX ORDER — LEVOCETIRIZINE DIHYDROCHLORIDE 5 MG/1
5 TABLET, FILM COATED ORAL DAILY
COMMUNITY

## 2023-08-19 RX ORDER — GLUCOSAMINE HCL 500 MG
1 TABLET ORAL DAILY
COMMUNITY

## 2023-08-19 RX ORDER — ATENOLOL 25 MG/1
1 TABLET ORAL DAILY
COMMUNITY
End: 2023-11-10 | Stop reason: ALTCHOICE

## 2023-08-28 ENCOUNTER — TELEPHONE (OUTPATIENT)
Dept: PRIMARY CARE | Facility: CLINIC | Age: 68
End: 2023-08-28
Payer: MEDICARE

## 2023-08-28 DIAGNOSIS — E55.9 VITAMIN D DEFICIENCY: ICD-10-CM

## 2023-08-28 DIAGNOSIS — E78.5 DYSLIPIDEMIA: ICD-10-CM

## 2023-08-30 ENCOUNTER — OFFICE VISIT (OUTPATIENT)
Dept: PRIMARY CARE | Facility: CLINIC | Age: 68
End: 2023-08-30
Payer: MEDICARE

## 2023-08-30 VITALS
SYSTOLIC BLOOD PRESSURE: 126 MMHG | HEIGHT: 64 IN | HEART RATE: 74 BPM | OXYGEN SATURATION: 98 % | WEIGHT: 124 LBS | BODY MASS INDEX: 21.17 KG/M2 | RESPIRATION RATE: 16 BRPM | DIASTOLIC BLOOD PRESSURE: 78 MMHG

## 2023-08-30 DIAGNOSIS — L03.114 CELLULITIS OF LEFT ELBOW: ICD-10-CM

## 2023-08-30 DIAGNOSIS — J30.1 SEASONAL ALLERGIC RHINITIS DUE TO POLLEN: ICD-10-CM

## 2023-08-30 DIAGNOSIS — I10 PRIMARY HYPERTENSION: Primary | ICD-10-CM

## 2023-08-30 PROBLEM — U07.1 COVID: Status: RESOLVED | Noted: 2023-08-19 | Resolved: 2023-08-30

## 2023-08-30 PROCEDURE — 96372 THER/PROPH/DIAG INJ SC/IM: CPT | Performed by: FAMILY MEDICINE

## 2023-08-30 PROCEDURE — 99213 OFFICE O/P EST LOW 20 MIN: CPT | Performed by: FAMILY MEDICINE

## 2023-08-30 RX ORDER — TRIAMCINOLONE ACETONIDE 40 MG/ML
80 INJECTION, SUSPENSION INTRA-ARTICULAR; INTRAMUSCULAR ONCE
Status: COMPLETED | OUTPATIENT
Start: 2023-08-30 | End: 2023-08-30

## 2023-08-30 RX ADMIN — TRIAMCINOLONE ACETONIDE 80 MG: 40 INJECTION, SUSPENSION INTRA-ARTICULAR; INTRAMUSCULAR at 11:54

## 2023-08-30 NOTE — PROGRESS NOTES
Subjective   Patient ID: Tonie Polanco is a 68 y.o. female who presents for Elbow Pain.    Elbow Pain    Pt is here today to follow up on left elbow pain and cellulitis. Pt was started on keflex and had an xray of that shoulder as well. Pt states that pain and swelling has gone down.     12 Systems have been reviewed as follows.  Constitutional: Fever, weight gain, weight loss, appetite change, night sweats, fatigue, chills.  Eyes : blurry, double vision, vision, loss, tearing, redness, pain, sensitivity to light, glaucoma.  Ears: nose, mouth, and throat: Hearing loss, ringing in the ears, ear pain, nasal congestion, nasal drainage, nosebleeds, mouth, throat, irritation tooth problem.  Cardiovascular: chest pain, pressure, heart racing, palpitations, sweating, leg swelling, high or low blood pressure  Pulmonary: Cough, yellow or green sputum, blood and sputum, shortness of breath, wheezing  Gastrointestinal: Nausea, vomiting, diarrhea, constipation, pain, blood in stool, or vomitus, heartburn, difficulty swallowing  Genitourinary: incontinence, abnormal bleeding, abnormal discharge, urinary frequency, urinary hesitancy, pain, impotence sexual problem, infection, urinary retention  Musculoskeletal: Pain, stiffness, joint, redness or warmth, arthritis, back pain, weakness, muscle wasting, sprain or fracture  Neuro: Weight weakness, dizziness, change in voice, change in taste change in vision, change in hearing, loss, or change of sensation, trouble walking, balance problems coordination problems, shaking, speech problem  Endocrine: cold or heat intolerance, blood sugar problem, weight gain or loss missed periods hot flashes, sweats, change in body hair, change in libido, increased thirst, increased urination  Heme/lymph: Swelling, bleeding, problem anemia, bruising, enlarged lymph nodes  Allergic/immunologic: H. plus nasal drip, watery itchy eyes, nasal drainage, immunosuppressed  The above were reviewed and noted  "negative except as noted in HPI and Problem List.      Objective   /78 (BP Location: Left arm, Patient Position: Sitting, BP Cuff Size: Adult)   Pulse 74   Resp 16   Ht 1.626 m (5' 4\")   Wt 56.2 kg (124 lb)   SpO2 98%   BMI 21.28 kg/m²     Physical Exam CONSTITUTIONAL- NAD, Pt is A & O x3, Vital signs reviewed per chart.  General Appearance- normal , good hygiene,talks easily  EYES-PERRLA conjunctiva and lids- normal  EARS/NOSE-TM's normal, head normocephalic and atraumatic, naso pharynx-no nasal discharge, no trismus,  oropharynx- normal without exudate  NECK- supple, FROM, without mass  thyroid- NT, normal size, no nodule noted  LYMPH- WNL  CV- RRR without murmur, gallop, or other abnormality.  PULM- CTA bilaterally  Respiratory effort- normal respiratory effort , no retractions or nasal flaring  ABDOMEN- normoactive BS's , soft , NT, no hepatosplenomegaly palpated, or masses. No pulsatile masses noted  extremities no edema,NT  SKIN- no abnormal skin lesions on inspection or palpation  neuro- no focal deficits  PSYCH- pleasant, normal judgement and insight     Assessment/Plan   Problem List Items Addressed This Visit       HTN (hypertension) - Primary     Other Visit Diagnoses       Cellulitis of left elbow        Seasonal allergic rhinitis due to pollen        Relevant Medications    triamcinolone acetonide (Kenalog-40) injection 80 mg (Completed)             Scribe Attestation  By signing my name below, I, Vimal Roblero DO  , Scribe   attest that this documentation has been prepared under the direction and in the presence of Vimal Roblero DO.  "

## 2023-09-07 ENCOUNTER — APPOINTMENT (OUTPATIENT)
Dept: PRIMARY CARE | Facility: CLINIC | Age: 68
End: 2023-09-07
Payer: MEDICARE

## 2023-09-11 ENCOUNTER — TELEMEDICINE (OUTPATIENT)
Dept: PRIMARY CARE | Facility: CLINIC | Age: 68
End: 2023-09-11
Payer: MEDICARE

## 2023-09-11 DIAGNOSIS — J32.9 SINUSITIS, UNSPECIFIED CHRONICITY, UNSPECIFIED LOCATION: ICD-10-CM

## 2023-09-11 DIAGNOSIS — R05.1 ACUTE COUGH: Primary | ICD-10-CM

## 2023-09-11 DIAGNOSIS — E78.5 HYPERLIPIDEMIA, UNSPECIFIED: ICD-10-CM

## 2023-09-11 PROCEDURE — 99213 OFFICE O/P EST LOW 20 MIN: CPT | Performed by: FAMILY MEDICINE

## 2023-09-11 RX ORDER — PROMETHAZINE HYDROCHLORIDE AND CODEINE PHOSPHATE 6.25; 1 MG/5ML; MG/5ML
5 SOLUTION ORAL EVERY 6 HOURS PRN
Qty: 120 ML | Refills: 0 | Status: SHIPPED | OUTPATIENT
Start: 2023-09-11 | End: 2023-09-18

## 2023-09-11 RX ORDER — CEFUROXIME AXETIL 500 MG/1
500 TABLET ORAL 2 TIMES DAILY
Qty: 20 TABLET | Refills: 0 | Status: SHIPPED | OUTPATIENT
Start: 2023-09-11 | End: 2023-09-21

## 2023-09-11 RX ORDER — SIMVASTATIN 10 MG/1
TABLET, FILM COATED ORAL
Qty: 90 TABLET | Refills: 1 | Status: SHIPPED | OUTPATIENT
Start: 2023-09-11 | End: 2024-03-11

## 2023-09-11 ASSESSMENT — ENCOUNTER SYMPTOMS
RHINORRHEA: 0
GASTROINTESTINAL NEGATIVE: 1
MYALGIAS: 0
PSYCHIATRIC NEGATIVE: 1
EYE PAIN: 0
COUGH: 0
CONSTITUTIONAL NEGATIVE: 1
SPEECH DIFFICULTY: 0
SINUS PAIN: 0
FATIGUE: 0
DECREASED CONCENTRATION: 0
POLYPHAGIA: 0
SLEEP DISTURBANCE: 0
CONFUSION: 0
CARDIOVASCULAR NEGATIVE: 1
NEUROLOGICAL NEGATIVE: 1
CHEST TIGHTNESS: 0
NECK STIFFNESS: 0
SINUS PRESSURE: 0
HEMATURIA: 0
HEADACHES: 0
TROUBLE SWALLOWING: 0
ARTHRALGIAS: 0
NERVOUS/ANXIOUS: 0
HEMATOLOGIC/LYMPHATIC NEGATIVE: 1
FLANK PAIN: 0
RECTAL PAIN: 0
MUSCULOSKELETAL NEGATIVE: 1
APPETITE CHANGE: 0
ABDOMINAL PAIN: 0
SHORTNESS OF BREATH: 0
ADENOPATHY: 0
STRIDOR: 0
DYSURIA: 0
ENDOCRINE NEGATIVE: 1
BLOOD IN STOOL: 0
FEVER: 0
ALLERGIC/IMMUNOLOGIC NEGATIVE: 1
POLYDIPSIA: 0
DIZZINESS: 0
AGITATION: 0
PHOTOPHOBIA: 0
DYSPHORIC MOOD: 0
EYES NEGATIVE: 1
RESPIRATORY NEGATIVE: 1
ACTIVITY CHANGE: 0
ABDOMINAL DISTENTION: 0
PALPITATIONS: 0
SEIZURES: 0
SORE THROAT: 1
COLOR CHANGE: 0
DIARRHEA: 0
CONSTIPATION: 0

## 2023-09-11 NOTE — PROGRESS NOTES
Subjective   Patient ID: Tonie Polanco is a 68 y.o. female who presents for Sore Throat.    Sore Throat   Associated symptoms include congestion. Pertinent negatives include no abdominal pain, coughing, diarrhea, ear discharge, ear pain, headaches, shortness of breath, stridor or trouble swallowing.    Pt states her throat is very sore,and has yellow mucous and her chest hurts from coughing.    Review of Systems   Constitutional: Negative.  Negative for activity change, appetite change, fatigue and fever.   HENT:  Positive for congestion and sore throat. Negative for dental problem, ear discharge, ear pain, mouth sores, rhinorrhea, sinus pressure, sinus pain, tinnitus and trouble swallowing.    Eyes: Negative.  Negative for photophobia, pain and visual disturbance.   Respiratory: Negative.  Negative for cough, chest tightness, shortness of breath and stridor.    Cardiovascular: Negative.  Negative for chest pain and palpitations.   Gastrointestinal: Negative.  Negative for abdominal distention, abdominal pain, blood in stool, constipation, diarrhea and rectal pain.   Endocrine: Negative.  Negative for cold intolerance, heat intolerance, polydipsia, polyphagia and polyuria.   Genitourinary: Negative.  Negative for dysuria, flank pain, hematuria and urgency.   Musculoskeletal: Negative.  Negative for arthralgias, gait problem, myalgias and neck stiffness.   Skin: Negative.  Negative for color change and rash.   Allergic/Immunologic: Negative.  Negative for environmental allergies and food allergies.   Neurological: Negative.  Negative for dizziness, seizures, syncope, speech difficulty and headaches.   Hematological: Negative.  Negative for adenopathy.   Psychiatric/Behavioral: Negative.  Negative for agitation, confusion, decreased concentration, dysphoric mood and sleep disturbance. The patient is not nervous/anxious.        Objective   There were no vitals taken for this visit.    Physical Exam  Constitutional:  Well developed, well nourished, alert and in no acute distress   Psychiatric: Mood calm and affect normal  Virtual Visit - Audio and Visual Communication Real Time    Assessment/Plan   Problem List Items Addressed This Visit    None  Visit Diagnoses       Acute cough    -  Primary    Relevant Medications    promethazine-codeine (Phenergan W/Codeine) 6.25-10 mg/5 mL syrup    Sinusitis, unspecified chronicity, unspecified location        Relevant Medications    promethazine-codeine (Phenergan W/Codeine) 6.25-10 mg/5 mL syrup    cefuroxime (Ceftin) 500 mg tablet

## 2023-09-13 ENCOUNTER — APPOINTMENT (OUTPATIENT)
Dept: PRIMARY CARE | Facility: CLINIC | Age: 68
End: 2023-09-13
Payer: MEDICARE

## 2023-09-30 ENCOUNTER — LAB (OUTPATIENT)
Dept: LAB | Facility: LAB | Age: 68
End: 2023-09-30
Payer: MEDICARE

## 2023-09-30 DIAGNOSIS — E78.5 DYSLIPIDEMIA: ICD-10-CM

## 2023-09-30 DIAGNOSIS — E55.9 VITAMIN D DEFICIENCY: ICD-10-CM

## 2023-09-30 LAB
25(OH)D3 SERPL-MCNC: 44 NG/ML (ref 30–100)
ALBUMIN SERPL BCP-MCNC: 4.1 G/DL (ref 3.4–5)
ALP SERPL-CCNC: 87 U/L (ref 33–136)
ALT SERPL W P-5'-P-CCNC: 20 U/L (ref 7–45)
ANION GAP SERPL CALC-SCNC: 11 MMOL/L (ref 10–20)
AST SERPL W P-5'-P-CCNC: 26 U/L (ref 9–39)
BASOPHILS # BLD AUTO: 0.04 X10*3/UL (ref 0–0.1)
BASOPHILS NFR BLD AUTO: 0.9 %
BILIRUB SERPL-MCNC: 0.9 MG/DL (ref 0–1.2)
BUN SERPL-MCNC: 11 MG/DL (ref 6–23)
CALCIUM SERPL-MCNC: 9.8 MG/DL (ref 8.6–10.3)
CHLORIDE SERPL-SCNC: 105 MMOL/L (ref 98–107)
CHOLEST SERPL-MCNC: 188 MG/DL (ref 0–199)
CHOLESTEROL/HDL RATIO: 2.3
CO2 SERPL-SCNC: 32 MMOL/L (ref 21–32)
CREAT SERPL-MCNC: 0.82 MG/DL (ref 0.5–1.05)
EOSINOPHIL # BLD AUTO: 0.16 X10*3/UL (ref 0–0.7)
EOSINOPHIL NFR BLD AUTO: 3.5 %
ERYTHROCYTE [DISTWIDTH] IN BLOOD BY AUTOMATED COUNT: 13.7 % (ref 11.5–14.5)
GFR SERPL CREATININE-BSD FRML MDRD: 78 ML/MIN/1.73M*2
GLUCOSE SERPL-MCNC: 79 MG/DL (ref 74–99)
HCT VFR BLD AUTO: 46.8 % (ref 36–46)
HDLC SERPL-MCNC: 82.2 MG/DL
HGB BLD-MCNC: 14.9 G/DL (ref 12–16)
IMM GRANULOCYTES # BLD AUTO: 0.01 X10*3/UL (ref 0–0.7)
IMM GRANULOCYTES NFR BLD AUTO: 0.2 % (ref 0–0.9)
LDLC SERPL CALC-MCNC: 91 MG/DL (ref 140–190)
LYMPHOCYTES # BLD AUTO: 1.64 X10*3/UL (ref 1.2–4.8)
LYMPHOCYTES NFR BLD AUTO: 36 %
MCH RBC QN AUTO: 31.1 PG (ref 26–34)
MCHC RBC AUTO-ENTMCNC: 31.8 G/DL (ref 32–36)
MCV RBC AUTO: 98 FL (ref 80–100)
MONOCYTES # BLD AUTO: 0.42 X10*3/UL (ref 0.1–1)
MONOCYTES NFR BLD AUTO: 9.2 %
NEUTROPHILS # BLD AUTO: 2.28 X10*3/UL (ref 1.2–7.7)
NEUTROPHILS NFR BLD AUTO: 50.2 %
NON HDL CHOLESTEROL: 106 MG/DL (ref 0–149)
NRBC BLD-RTO: 0 /100 WBCS (ref 0–0)
PLATELET # BLD AUTO: 326 X10*3/UL (ref 150–450)
PMV BLD AUTO: 12.9 FL (ref 7.5–11.5)
POTASSIUM SERPL-SCNC: 5 MMOL/L (ref 3.5–5.3)
PROT SERPL-MCNC: 6.9 G/DL (ref 6.4–8.2)
RBC # BLD AUTO: 4.79 X10*6/UL (ref 4–5.2)
SODIUM SERPL-SCNC: 143 MMOL/L (ref 136–145)
TRIGL SERPL-MCNC: 76 MG/DL (ref 0–149)
VLDL: 15 MG/DL (ref 0–40)
WBC # BLD AUTO: 4.6 X10*3/UL (ref 4.4–11.3)

## 2023-09-30 PROCEDURE — 36415 COLL VENOUS BLD VENIPUNCTURE: CPT

## 2023-10-02 ENCOUNTER — TELEPHONE (OUTPATIENT)
Dept: PRIMARY CARE | Facility: CLINIC | Age: 68
End: 2023-10-02
Payer: MEDICARE

## 2023-10-02 NOTE — TELEPHONE ENCOUNTER
Dr. Batista/Osbaldo Pt    Pt wanting MA, ARMANDO or LUIS ANTONIO to call and discuss blood work results- there are a couple of levels she is really concerned about. Please advise, thank you    Tonie  [phone]

## 2023-10-12 ENCOUNTER — TELEPHONE (OUTPATIENT)
Dept: ORTHOPEDIC SURGERY | Facility: CLINIC | Age: 68
End: 2023-10-12

## 2023-10-12 ENCOUNTER — OFFICE VISIT (OUTPATIENT)
Dept: ORTHOPEDIC SURGERY | Facility: CLINIC | Age: 68
End: 2023-10-12
Payer: MEDICARE

## 2023-10-12 ENCOUNTER — CLINICAL SUPPORT (OUTPATIENT)
Dept: ORTHOPEDIC SURGERY | Facility: CLINIC | Age: 68
End: 2023-10-12
Payer: MEDICARE

## 2023-10-12 DIAGNOSIS — S32.040A COMPRESSION FRACTURE OF L4 LUMBAR VERTEBRA, CLOSED, INITIAL ENCOUNTER (MULTI): Primary | ICD-10-CM

## 2023-10-12 DIAGNOSIS — M47.26 OSTEOARTHRITIS OF SPINE WITH RADICULOPATHY, LUMBAR REGION: ICD-10-CM

## 2023-10-12 PROCEDURE — 1126F AMNT PAIN NOTED NONE PRSNT: CPT | Performed by: PHYSICIAN ASSISTANT

## 2023-10-12 PROCEDURE — 99214 OFFICE O/P EST MOD 30 MIN: CPT | Performed by: PHYSICIAN ASSISTANT

## 2023-10-12 PROCEDURE — 1036F TOBACCO NON-USER: CPT | Performed by: PHYSICIAN ASSISTANT

## 2023-10-12 PROCEDURE — 72110 X-RAY EXAM L-2 SPINE 4/>VWS: CPT | Performed by: ORTHOPAEDIC SURGERY

## 2023-10-12 PROCEDURE — 1159F MED LIST DOCD IN RCRD: CPT | Performed by: PHYSICIAN ASSISTANT

## 2023-10-12 PROCEDURE — 1160F RVW MEDS BY RX/DR IN RCRD: CPT | Performed by: PHYSICIAN ASSISTANT

## 2023-10-12 RX ORDER — TIZANIDINE HYDROCHLORIDE 4 MG/1
4 CAPSULE, GELATIN COATED ORAL 3 TIMES DAILY
Qty: 30 CAPSULE | Refills: 0 | Status: SHIPPED | OUTPATIENT
Start: 2023-10-12 | End: 2023-11-10 | Stop reason: ALTCHOICE

## 2023-10-12 RX ORDER — PREDNISONE 10 MG/1
TABLET ORAL
Qty: 20 TABLET | Refills: 0 | Status: SHIPPED | OUTPATIENT
Start: 2023-10-12 | End: 2023-10-25 | Stop reason: ALTCHOICE

## 2023-10-12 NOTE — PROGRESS NOTES
New patient to us new to the practice comes to the office complaining of low back pain.  She had a fall walking out of the house slipping on the stairs and has been having low back pain since.  It goes down into the groin and some into the hip a little.  She had a hip replacement and she also had a spine surgery in the past.  1996.  She has no radiculopathy down the legs no paresthesia no bowel or bladder complaints no saddle anesthesia no gait or balance changes    Past medical history review of systems are were all reviewed and unchanged    Physical exam well-nourished, well kept.  No lymphangitis or lymphadenopathy in the examined extremities.  Good perfusion to the extremities ×4.  Radial and dorsalis pedis pulses 2+.  Capillary refill to all 4 digits brisk.  No distal edema x 4.  Gait normal.  Can walk on heels and toes.  Examination of the neck reveals no swelling, step-off, or point tenderness.  Range of motion with flexion, extension, side bending and rotation is well maintained without crepitance, instability, or exacerbation of pain.  Strength is within normal limits.  There is decreased range of motion flexion extension rotation lumbar spine tender in the lumbar spinal musculature good strength no instability examination of the upper extremities reveals no point tenderness, swelling, or deformity.  Range of motion of the shoulders, elbows, wrists, and fingers are all full without crepitance, instability, or exacerbation of pain.  Strength is 5/5 throughout.  Examination of the lower extremities reveals no point tenderness, swelling, or deformity.  Range of motion of the hips, knees, and ankles are full without crepitance, instability, or exacerbation of pain.  Strength is 5/5 throughout.  No redness, abrasions, or lesions on all 4 extremities, head and neck, or trunk.  Gross sensation intact in the extremities ×4.  Deep tendon reflexes 2+ and symmetric bilaterally.  Alex, clonus, and Babinski were  negative.  Straight leg raise negative.  Affect normal.  Alert and oriented ×3.  Coordination normal.  X-rays taken today AP lateral flexion-extension show arthritic degenerative changes diffusely in the lumbar spine.  There is a mild compression superior endplate of lumbar 4 and lumbar 1.    Assessment this is a patient with low back pain after a fall and we have some concerns for an acute compression fracture.  We talked to her and her  who is with her also gives a history of her injury and treatment.    Plan we will try some muscle relaxants and steroids to reduce inflammation and spasm in her back we will obtain an MRI of the lumbar spine for the compression fracture acuteness we will pre-CERT for a LSO brace if there is an acute fracture we will get her into that.  She will follow-up with me in [facility] after the MRI is completed that MRIs to be done at [place]

## 2023-10-17 ENCOUNTER — ANCILLARY PROCEDURE (OUTPATIENT)
Dept: RADIOLOGY | Facility: CLINIC | Age: 68
End: 2023-10-17
Payer: MEDICARE

## 2023-10-17 DIAGNOSIS — M47.26 OSTEOARTHRITIS OF SPINE WITH RADICULOPATHY, LUMBAR REGION: ICD-10-CM

## 2023-10-17 DIAGNOSIS — S32.040A COMPRESSION FRACTURE OF L4 LUMBAR VERTEBRA, CLOSED, INITIAL ENCOUNTER (MULTI): ICD-10-CM

## 2023-10-17 PROCEDURE — 72148 MRI LUMBAR SPINE W/O DYE: CPT | Performed by: RADIOLOGY

## 2023-10-17 PROCEDURE — 72148 MRI LUMBAR SPINE W/O DYE: CPT | Mod: ME

## 2023-10-18 ENCOUNTER — TELEPHONE (OUTPATIENT)
Dept: PRIMARY CARE | Facility: CLINIC | Age: 68
End: 2023-10-18
Payer: MEDICARE

## 2023-10-18 DIAGNOSIS — M47.26 OSTEOARTHRITIS OF SPINE WITH RADICULOPATHY, LUMBAR REGION: ICD-10-CM

## 2023-10-18 DIAGNOSIS — Z96.643 S/P HIP REPLACEMENT, BILATERAL: ICD-10-CM

## 2023-10-18 NOTE — TELEPHONE ENCOUNTER
Dr Batista pt    Pt phoned office and is requesting a handicap placard. Pt has had hip surgery on both hips and is going in for back surgery.

## 2023-10-20 ENCOUNTER — APPOINTMENT (OUTPATIENT)
Dept: ORTHOPEDIC SURGERY | Facility: CLINIC | Age: 68
End: 2023-10-20
Payer: MEDICARE

## 2023-10-23 ENCOUNTER — OFFICE VISIT (OUTPATIENT)
Dept: PRIMARY CARE | Facility: CLINIC | Age: 68
End: 2023-10-23
Payer: MEDICARE

## 2023-10-23 VITALS
HEIGHT: 64 IN | BODY MASS INDEX: 20.38 KG/M2 | SYSTOLIC BLOOD PRESSURE: 110 MMHG | WEIGHT: 119.4 LBS | OXYGEN SATURATION: 99 % | DIASTOLIC BLOOD PRESSURE: 70 MMHG | HEART RATE: 60 BPM | RESPIRATION RATE: 16 BRPM

## 2023-10-23 DIAGNOSIS — E78.5 DYSLIPIDEMIA: ICD-10-CM

## 2023-10-23 DIAGNOSIS — I10 PRIMARY HYPERTENSION: ICD-10-CM

## 2023-10-23 DIAGNOSIS — G43.019 INTRACTABLE MIGRAINE WITHOUT AURA AND WITHOUT STATUS MIGRAINOSUS: Primary | ICD-10-CM

## 2023-10-23 DIAGNOSIS — J01.01 ACUTE RECURRENT MAXILLARY SINUSITIS: ICD-10-CM

## 2023-10-23 DIAGNOSIS — F41.1 GENERALIZED ANXIETY DISORDER: ICD-10-CM

## 2023-10-23 PROCEDURE — 1126F AMNT PAIN NOTED NONE PRSNT: CPT | Performed by: FAMILY MEDICINE

## 2023-10-23 PROCEDURE — 1159F MED LIST DOCD IN RCRD: CPT | Performed by: FAMILY MEDICINE

## 2023-10-23 PROCEDURE — 99214 OFFICE O/P EST MOD 30 MIN: CPT | Performed by: FAMILY MEDICINE

## 2023-10-23 PROCEDURE — 96372 THER/PROPH/DIAG INJ SC/IM: CPT | Performed by: FAMILY MEDICINE

## 2023-10-23 PROCEDURE — 3078F DIAST BP <80 MM HG: CPT | Performed by: FAMILY MEDICINE

## 2023-10-23 PROCEDURE — 3074F SYST BP LT 130 MM HG: CPT | Performed by: FAMILY MEDICINE

## 2023-10-23 PROCEDURE — 1160F RVW MEDS BY RX/DR IN RCRD: CPT | Performed by: FAMILY MEDICINE

## 2023-10-23 PROCEDURE — 1036F TOBACCO NON-USER: CPT | Performed by: FAMILY MEDICINE

## 2023-10-23 RX ORDER — KETOROLAC TROMETHAMINE 30 MG/ML
45 INJECTION, SOLUTION INTRAMUSCULAR; INTRAVENOUS ONCE
Status: COMPLETED | OUTPATIENT
Start: 2023-10-23 | End: 2023-10-23

## 2023-10-23 RX ORDER — CEFTRIAXONE 1 G/1
1 INJECTION, POWDER, FOR SOLUTION INTRAMUSCULAR; INTRAVENOUS ONCE
Status: COMPLETED | OUTPATIENT
Start: 2023-10-23 | End: 2023-10-23

## 2023-10-23 RX ORDER — CEFDINIR 300 MG/1
300 CAPSULE ORAL 2 TIMES DAILY
Qty: 20 CAPSULE | Refills: 0 | Status: SHIPPED | OUTPATIENT
Start: 2023-10-23 | End: 2023-11-02

## 2023-10-23 RX ADMIN — CEFTRIAXONE 1 G: 1 INJECTION, POWDER, FOR SOLUTION INTRAMUSCULAR; INTRAVENOUS at 10:28

## 2023-10-23 RX ADMIN — KETOROLAC TROMETHAMINE 45 MG: 30 INJECTION, SOLUTION INTRAMUSCULAR; INTRAVENOUS at 10:29

## 2023-10-23 ASSESSMENT — ENCOUNTER SYMPTOMS
CONSTIPATION: 0
ACTIVITY CHANGE: 0
AGITATION: 0
POLYDIPSIA: 0
ADENOPATHY: 0
SINUS PRESSURE: 0
FATIGUE: 0
HEMATURIA: 0
DYSPHORIC MOOD: 0
CHEST TIGHTNESS: 0
APPETITE CHANGE: 0
POLYPHAGIA: 0
RHINORRHEA: 0
FEVER: 0
TROUBLE SWALLOWING: 0
DECREASED CONCENTRATION: 0
PALPITATIONS: 0
NERVOUS/ANXIOUS: 0
DIZZINESS: 0
COUGH: 0
MYALGIAS: 0
DYSURIA: 0
SORE THROAT: 0
CONSTITUTIONAL NEGATIVE: 1
ABDOMINAL DISTENTION: 0
NECK STIFFNESS: 0
SHORTNESS OF BREATH: 0
ABDOMINAL PAIN: 0
SLEEP DISTURBANCE: 0
FLANK PAIN: 0
SINUS PAIN: 0
ARTHRALGIAS: 0
PHOTOPHOBIA: 0
BLOOD IN STOOL: 0
HEADACHES: 1
CONFUSION: 0
EYE PAIN: 0
COLOR CHANGE: 0
SPEECH DIFFICULTY: 0
RECTAL PAIN: 0
DIARRHEA: 0
SEIZURES: 0
STRIDOR: 0

## 2023-10-23 NOTE — PROGRESS NOTES
"Subjective   Patient ID: Tonie Polanco is a 68 y.o. female who presents for Migraine.    Patient presents in office with a complaint of a reoccurring migraine. Patient admits she intermittently experiences migraines. Patient admits the pain is primarily around her eyes. Patient admits to sweating, nausea and sensitivity to lights and sounds. Patient has tried Advil which provided no relief.          Review of Systems   Constitutional: Negative.  Negative for activity change, appetite change, fatigue and fever.   HENT:  Negative for congestion, dental problem, ear discharge, ear pain, mouth sores, rhinorrhea, sinus pressure, sinus pain, sore throat, tinnitus and trouble swallowing.    Eyes:  Negative for photophobia, pain and visual disturbance.   Respiratory:  Negative for cough, chest tightness, shortness of breath and stridor.    Cardiovascular:  Negative for chest pain and palpitations.   Gastrointestinal:  Negative for abdominal distention, abdominal pain, blood in stool, constipation, diarrhea and rectal pain.   Endocrine: Negative for cold intolerance, heat intolerance, polydipsia, polyphagia and polyuria.   Genitourinary:  Negative for dysuria, flank pain, hematuria and urgency.   Musculoskeletal:  Negative for arthralgias, gait problem, myalgias and neck stiffness.   Skin:  Negative for color change and rash.   Allergic/Immunologic: Negative for environmental allergies and food allergies.   Neurological:  Positive for headaches. Negative for dizziness, seizures, syncope and speech difficulty.   Hematological:  Negative for adenopathy.   Psychiatric/Behavioral:  Negative for agitation, confusion, decreased concentration, dysphoric mood and sleep disturbance. The patient is not nervous/anxious.        Objective   /70 (BP Location: Left arm, Patient Position: Sitting, BP Cuff Size: Adult)   Pulse 60   Resp 16   Ht 1.626 m (5' 4\")   Wt 54.2 kg (119 lb 6.4 oz)   SpO2 99%   BMI 20.49 kg/m² "     Physical Exam  Vitals reviewed.   Constitutional:       General: She is not in acute distress.     Appearance: Normal appearance. She is normal weight. She is not ill-appearing or diaphoretic.   HENT:      Head: Normocephalic.        Right Ear: Tympanic membrane and external ear normal.      Left Ear: Tympanic membrane and external ear normal.      Nose: Nose normal. No congestion.      Mouth/Throat:      Pharynx: No posterior oropharyngeal erythema.   Eyes:      General:         Right eye: No discharge.         Left eye: No discharge.      Extraocular Movements: Extraocular movements intact.      Conjunctiva/sclera: Conjunctivae normal.      Pupils: Pupils are equal, round, and reactive to light.   Cardiovascular:      Rate and Rhythm: Normal rate and regular rhythm.      Pulses: Normal pulses.      Heart sounds: Normal heart sounds. No murmur heard.  Pulmonary:      Effort: Pulmonary effort is normal. No respiratory distress.      Breath sounds: Normal breath sounds. No wheezing or rales.   Chest:      Chest wall: No tenderness.   Abdominal:      General: Abdomen is flat. Bowel sounds are normal. There is no distension.      Palpations: There is no mass.      Tenderness: There is no abdominal tenderness. There is no guarding.   Musculoskeletal:         General: No tenderness. Normal range of motion.      Cervical back: Normal range of motion and neck supple. No tenderness.      Right lower leg: No edema.      Left lower leg: No edema.   Skin:     General: Skin is dry.      Coloration: Skin is not jaundiced.      Findings: No bruising, erythema or rash.   Neurological:      General: No focal deficit present.      Mental Status: She is alert and oriented to person, place, and time. Mental status is at baseline.      Cranial Nerves: No cranial nerve deficit.      Sensory: No sensory deficit.      Coordination: Coordination normal.      Gait: Gait normal.   Psychiatric:         Mood and Affect: Mood normal.          Thought Content: Thought content normal.         Judgment: Judgment normal.         Assessment/Plan   Problem List Items Addressed This Visit             ICD-10-CM    Dyslipidemia E78.5    Generalized anxiety disorder F41.1    HTN (hypertension) I10    Intractable migraine without aura and without status migrainosus - Primary G43.019    Relevant Medications    ketorolac (Toradol) injection 45 mg     Other Visit Diagnoses         Codes    Acute recurrent maxillary sinusitis     J01.01    Relevant Medications    cefdinir (Omnicef) 300 mg capsule    cefTRIAXone (Rocephin) vial 1 g              Scribe Attestation  By signing my name below, IArely RMA , Brennaibe   attest that this documentation has been prepared under the direction and in the presence of Mathew Batista DO.   Provider Attestation - Scribe documentation    All medical record entries made by the Scribe were at my direction and personally dictated by me. I have reviewed the chart and agree that the record accurately reflects my personal performance of the history, physical exam, discussion and plan.

## 2023-10-24 NOTE — PATIENT INSTRUCTIONS
Follow up in 8 hours virtual visit    Continue current medications and therapy for chronic medical conditions.    Patient was advised importance of proper diet/nutrition in addition adequate hydration. Patient was encouraged moderate exercise program to include 30 minutes daily for 5 days of the week or 150 minutes weekly. Patient will follow-up with us as scheduled.    I personally reviewed the OARRS report for this patient. I have considered the risks of abuse, dependence, addiction, and diversion.    UDS/CSA:    Toradol 45 mg IM x1    Rocephin 1 g x 1    Start Omnicef 300 mg 2 daily

## 2023-10-25 ENCOUNTER — TELEMEDICINE (OUTPATIENT)
Dept: PRIMARY CARE | Facility: CLINIC | Age: 68
End: 2023-10-25
Payer: MEDICARE

## 2023-10-25 ENCOUNTER — OFFICE VISIT (OUTPATIENT)
Dept: ORTHOPEDIC SURGERY | Facility: CLINIC | Age: 68
End: 2023-10-25
Payer: MEDICARE

## 2023-10-25 DIAGNOSIS — M80.00XD OSTEOPOROSIS WITH CURRENT PATHOLOGICAL FRACTURE WITH ROUTINE HEALING, UNSPECIFIED OSTEOPOROSIS TYPE, SUBSEQUENT ENCOUNTER: ICD-10-CM

## 2023-10-25 DIAGNOSIS — I10 PRIMARY HYPERTENSION: ICD-10-CM

## 2023-10-25 DIAGNOSIS — M47.816 OSTEOARTHRITIS OF LUMBAR SPINE, UNSPECIFIED SPINAL OSTEOARTHRITIS COMPLICATION STATUS: ICD-10-CM

## 2023-10-25 DIAGNOSIS — G43.019 INTRACTABLE MIGRAINE WITHOUT AURA AND WITHOUT STATUS MIGRAINOSUS: Primary | ICD-10-CM

## 2023-10-25 DIAGNOSIS — S32.000S LUMBAR COMPRESSION FRACTURE, SEQUELA: Primary | ICD-10-CM

## 2023-10-25 PROCEDURE — 1036F TOBACCO NON-USER: CPT | Performed by: PHYSICIAN ASSISTANT

## 2023-10-25 PROCEDURE — 1159F MED LIST DOCD IN RCRD: CPT | Performed by: PHYSICIAN ASSISTANT

## 2023-10-25 PROCEDURE — 1160F RVW MEDS BY RX/DR IN RCRD: CPT | Performed by: PHYSICIAN ASSISTANT

## 2023-10-25 PROCEDURE — 1126F AMNT PAIN NOTED NONE PRSNT: CPT | Performed by: PHYSICIAN ASSISTANT

## 2023-10-25 PROCEDURE — 99213 OFFICE O/P EST LOW 20 MIN: CPT | Performed by: PHYSICIAN ASSISTANT

## 2023-10-25 PROCEDURE — 99213 OFFICE O/P EST LOW 20 MIN: CPT | Mod: PO | Performed by: PHYSICIAN ASSISTANT

## 2023-10-25 PROCEDURE — 99442 PR PHYS/QHP TELEPHONE EVALUATION 11-20 MIN: CPT | Performed by: FAMILY MEDICINE

## 2023-10-25 ASSESSMENT — ENCOUNTER SYMPTOMS
SORE THROAT: 0
COLOR CHANGE: 0
EYE PAIN: 0
POLYDIPSIA: 0
NERVOUS/ANXIOUS: 0
APPETITE CHANGE: 0
HEMATURIA: 0
STRIDOR: 0
POLYPHAGIA: 0
ARTHRALGIAS: 0
SINUS PAIN: 0
AGITATION: 0
SEIZURES: 0
SPEECH DIFFICULTY: 0
DYSPHORIC MOOD: 0
DECREASED CONCENTRATION: 0
TROUBLE SWALLOWING: 0
RECTAL PAIN: 0
DIARRHEA: 0
RHINORRHEA: 0
SHORTNESS OF BREATH: 0
COUGH: 0
ADENOPATHY: 0
DIZZINESS: 0
FLANK PAIN: 0
CONSTIPATION: 0
SINUS PRESSURE: 0
ABDOMINAL DISTENTION: 0
BACK PAIN: 1
PALPITATIONS: 0
MYALGIAS: 0
PHOTOPHOBIA: 0
DYSURIA: 0
CONFUSION: 0
NECK STIFFNESS: 0
ABDOMINAL PAIN: 0
SLEEP DISTURBANCE: 0
HEADACHES: 0
CONSTITUTIONAL NEGATIVE: 1
CHEST TIGHTNESS: 0
BLOOD IN STOOL: 0
FEVER: 0
ACTIVITY CHANGE: 0
FATIGUE: 0

## 2023-10-25 NOTE — PATIENT INSTRUCTIONS
Follow up in 1 month    Continue current medications and therapy for chronic medical conditions.    Patient was advised importance of proper diet/nutrition in addition adequate hydration. Patient was encouraged moderate exercise program to include 30 minutes daily for 5 days of the week or 150 minutes weekly. Patient will follow-up with us as scheduled.    Review of the MRI lumbar spine occurred.     Reviewed bone density from November 2022    Prolia injections are suggested for twice per year treatment.     If insurance does not cover, tablet form Fosamax will be ordered.    Headaches are improving and sinus pressure is reduced.

## 2023-10-25 NOTE — PROGRESS NOTES
Patient here for follow-up for low back pain.  Patient had an MRI which shows inferior endplate edema at the L3 vertebral body diagnostic for mild compression fracture.  We talked to the patient and  today and she says her pain is not any worse it is a little bit better and it is somewhat tolerable.  She denies bowel or bladder complaints fever chills nausea vomiting or night sweats or saddle anesthesia.    Physical exam: Well-nourished, well kept.  No lymphangitis or lymphadenopathy in the examined extremities.  Good perfusion to the lower extremities bilaterally.  Dorsalis pedis pulses 2+.  Capillary refill to the digits brisk.  No distal edema.  Gait normal.  Can walk on heels and toes.  There is mild decreased range of motion flexion extension rotation lumbar spine mildly tender good strength no instability.  Examination of the lower extremities reveals no point tenderness, swelling, or deformity.  Range of motion of the hips, knees, and ankles are full without crepitance, instability, or exacerbation of pain.  Strength is 5/5 throughout.  No redness, abrasions, or lesions on the lower extremities bilaterally.  Gross sensation intact to the lower extremity is bilaterally.  Affect normal.    Assessment: Patient has an acute compression fracture of L3 and we discussed treatment options.  We talked about wearing a brace, not wearing a brace and kyphoplasty procedure.  Brace is 100% improved with the insurance so she would like to try the brace.  We discussed all the risk benefits the options of the brace.  Patient was advised how to wear the brace when not to wear the brace out to apply the brace which was done in our office today.    Plan patient will continue conservative care with the brace anti-inflammatories and she will follow-up as needed if her pain worsen she will return and she will need a lateral x-ray lumbar spine

## 2023-10-25 NOTE — PROGRESS NOTES
Subjective   Patient ID: Tonie Polanco is a 68 y.o. female who presents for Back Pain.    Back Pain  Pertinent negatives include no abdominal pain, chest pain, dysuria, fever or headaches.    Patient is following up on low back pain. She had MRI of lumbar spine and visit with Dr. Talamantes , the ortho surgeon.   Dr Talamantes stated she has 2nd vertebrate fracture and suggested surgery or back brace She decide back brace is best for now. She should anticipate 3 months for recovery. She is still taking the antibiotic and advil.   Her headaches are better. She took off of work for the week. She is on the antibiotic but is having sweats with the medication or fighting the infection.     Review of Systems   Constitutional: Negative.  Negative for activity change, appetite change, fatigue and fever.   HENT:  Negative for congestion, dental problem, ear discharge, ear pain, mouth sores, rhinorrhea, sinus pressure, sinus pain, sore throat, tinnitus and trouble swallowing.    Eyes:  Negative for photophobia, pain and visual disturbance.   Respiratory:  Negative for cough, chest tightness, shortness of breath and stridor.    Cardiovascular:  Negative for chest pain and palpitations.   Gastrointestinal:  Negative for abdominal distention, abdominal pain, blood in stool, constipation, diarrhea and rectal pain.   Endocrine: Negative for cold intolerance, heat intolerance, polydipsia, polyphagia and polyuria.   Genitourinary:  Negative for dysuria, flank pain, hematuria and urgency.   Musculoskeletal:  Positive for back pain. Negative for arthralgias, gait problem, myalgias and neck stiffness.   Skin:  Negative for color change and rash.   Allergic/Immunologic: Negative for environmental allergies and food allergies.   Neurological:  Negative for dizziness, seizures, syncope, speech difficulty and headaches.   Hematological:  Negative for adenopathy.   Psychiatric/Behavioral:  Negative for agitation, confusion, decreased  concentration, dysphoric mood and sleep disturbance. The patient is not nervous/anxious.        Objective   There were no vitals taken for this visit.    Physical Exam  Constitutional: Well developed, well nourished, alert and in no acute distress   Psychiatric: Mood calm and affect normal  Telephone Visit - Audio Communication Only      Assessment/Plan   Problem List Items Addressed This Visit             ICD-10-CM    Degenerative arthritis of lumbar spine M47.816    Relevant Medications    denosumab (Prolia) injection 60 mg    HTN (hypertension) I10    Osteoporosis M81.0    Relevant Medications    denosumab (Prolia) injection 60 mg    Intractable migraine without aura and without status migrainosus - Primary G43.019        Scribe Attestation  By signing my name below, I, Mathew Batista DO   , Scribe attest that this documentation has been prepared under the direction and in the presence of Mathew Batista DO.  Provider Attestation - Scribe documentation    All medical record entries made by the Scribe were at my direction and personally dictated by me. I have reviewed the chart and agree that the record accurately reflects my personal performance of the history, physical exam, discussion and plan.

## 2023-11-07 DIAGNOSIS — J01.00 ACUTE NON-RECURRENT MAXILLARY SINUSITIS: ICD-10-CM

## 2023-11-07 RX ORDER — AMOXICILLIN AND CLAVULANATE POTASSIUM 875; 125 MG/1; MG/1
875 TABLET, FILM COATED ORAL 2 TIMES DAILY
Qty: 20 TABLET | Refills: 0 | Status: SHIPPED | OUTPATIENT
Start: 2023-11-07 | End: 2023-11-30 | Stop reason: SDUPTHER

## 2023-11-07 NOTE — TELEPHONE ENCOUNTER
Dr Batista pt    Pt phoned office and would like a antibiotic called in for sinus infection/ cold. Pt tested for covid and was neg. Pt is sched to see LUIS ANTONIO on Friday.    Symptoms:  Headache  Cough  Congestion  Sore throat      Pt is also coughing up phlegm.    COBY Bello

## 2023-11-10 ENCOUNTER — OFFICE VISIT (OUTPATIENT)
Dept: PRIMARY CARE | Facility: CLINIC | Age: 68
End: 2023-11-10
Payer: MEDICARE

## 2023-11-10 VITALS
TEMPERATURE: 97 F | SYSTOLIC BLOOD PRESSURE: 122 MMHG | HEIGHT: 64 IN | RESPIRATION RATE: 15 BRPM | BODY MASS INDEX: 20.76 KG/M2 | OXYGEN SATURATION: 98 % | HEART RATE: 80 BPM | WEIGHT: 121.6 LBS | DIASTOLIC BLOOD PRESSURE: 80 MMHG

## 2023-11-10 DIAGNOSIS — T78.40XS ALLERGY, SEQUELA: ICD-10-CM

## 2023-11-10 DIAGNOSIS — Z71.89 ACP (ADVANCE CARE PLANNING): ICD-10-CM

## 2023-11-10 DIAGNOSIS — D51.9 ANEMIA DUE TO VITAMIN B12 DEFICIENCY, UNSPECIFIED B12 DEFICIENCY TYPE: ICD-10-CM

## 2023-11-10 DIAGNOSIS — J31.0 CHRONIC RHINITIS: ICD-10-CM

## 2023-11-10 DIAGNOSIS — Z00.00 ENCOUNTER FOR MEDICARE ANNUAL WELLNESS EXAM: Primary | ICD-10-CM

## 2023-11-10 DIAGNOSIS — M80.00XD AGE-RELATED OSTEOPOROSIS WITH CURRENT PATHOLOGICAL FRACTURE WITH ROUTINE HEALING, SUBSEQUENT ENCOUNTER: ICD-10-CM

## 2023-11-10 DIAGNOSIS — T78.40XA ALLERGY, UNSPECIFIED, INITIAL ENCOUNTER: ICD-10-CM

## 2023-11-10 DIAGNOSIS — Z12.31 ENCOUNTER FOR SCREENING MAMMOGRAM FOR BREAST CANCER: ICD-10-CM

## 2023-11-10 DIAGNOSIS — Z00.00 ROUTINE GENERAL MEDICAL EXAMINATION AT HEALTH CARE FACILITY: ICD-10-CM

## 2023-11-10 PROCEDURE — 99497 ADVNCD CARE PLAN 30 MIN: CPT | Performed by: FAMILY MEDICINE

## 2023-11-10 PROCEDURE — G0439 PPPS, SUBSEQ VISIT: HCPCS | Performed by: FAMILY MEDICINE

## 2023-11-10 PROCEDURE — 99397 PER PM REEVAL EST PAT 65+ YR: CPT | Performed by: FAMILY MEDICINE

## 2023-11-10 RX ORDER — DENOSUMAB 60 MG/ML
60 INJECTION SUBCUTANEOUS
Qty: 1 EACH | Refills: 1 | Status: SHIPPED | OUTPATIENT
Start: 2023-11-10

## 2023-11-10 ASSESSMENT — ENCOUNTER SYMPTOMS
SHORTNESS OF BREATH: 0
SINUS PAIN: 0
CHEST TIGHTNESS: 0
APPETITE CHANGE: 0
SPEECH DIFFICULTY: 0
POLYDIPSIA: 0
SORE THROAT: 0
FEVER: 0
BLOOD IN STOOL: 0
PHOTOPHOBIA: 0
HEMATURIA: 0
ACTIVITY CHANGE: 0
LOSS OF SENSATION IN FEET: 0
DECREASED CONCENTRATION: 0
COLOR CHANGE: 0
RHINORRHEA: 0
COUGH: 0
MYALGIAS: 0
OCCASIONAL FEELINGS OF UNSTEADINESS: 0
NERVOUS/ANXIOUS: 0
ABDOMINAL DISTENTION: 0
NECK STIFFNESS: 0
ARTHRALGIAS: 0
SEIZURES: 0
RECTAL PAIN: 0
TROUBLE SWALLOWING: 0
SLEEP DISTURBANCE: 0
DIZZINESS: 0
HEADACHES: 0
DEPRESSION: 0
DYSPHORIC MOOD: 0
CONFUSION: 0
DYSURIA: 0
AGITATION: 0
ABDOMINAL PAIN: 0
SINUS PRESSURE: 0
STRIDOR: 0
ADENOPATHY: 0
DIARRHEA: 0
FLANK PAIN: 0
CONSTITUTIONAL NEGATIVE: 1
POLYPHAGIA: 0
EYE PAIN: 0
FATIGUE: 0
CONSTIPATION: 0
PALPITATIONS: 0

## 2023-11-10 ASSESSMENT — ACTIVITIES OF DAILY LIVING (ADL)
MANAGING_FINANCES: INDEPENDENT
TAKING_MEDICATION: INDEPENDENT
BATHING: INDEPENDENT
DRESSING: INDEPENDENT
DOING_HOUSEWORK: INDEPENDENT
GROCERY_SHOPPING: INDEPENDENT

## 2023-11-10 ASSESSMENT — PATIENT HEALTH QUESTIONNAIRE - PHQ9
1. LITTLE INTEREST OR PLEASURE IN DOING THINGS: NOT AT ALL
SUM OF ALL RESPONSES TO PHQ9 QUESTIONS 1 AND 2: 0
2. FEELING DOWN, DEPRESSED OR HOPELESS: NOT AT ALL

## 2023-11-10 NOTE — PROGRESS NOTES
Subjective   Reason for Visit: Tonie Polanco is an 68 y.o. female here for a Medicare Wellness visit.     Past Medical, Surgical, and Family History reviewed and updated in chart.    Reviewed all medications by prescribing practitioner or clinical pharmacist (such as prescriptions, OTCs, herbal therapies and supplements) and documented in the medical record.    Patient is here for medicare annual wellness visit.    Patient denies have any symptoms or concerns today.        Patient Care Team:  Mathew Batista DO as PCP - General (Family Medicine)  [facility]athe[facility] Batista [facility]     Review of Systems   Constitutional: Negative.  Negative for activity change, appetite change, fatigue and fever.   HENT:  Negative for congestion, dental problem, ear discharge, ear pain, mouth sores, rhinorrhea, sinus pressure, sinus pain, sore throat, tinnitus and trouble swallowing.    Eyes:  Negative for photophobia, pain and visual disturbance.   Respiratory:  Negative for cough, chest tightness, shortness of breath and stridor.    Cardiovascular:  Negative for chest pain and palpitations.   Gastrointestinal:  Negative for abdominal distention, abdominal pain, blood in stool, constipation, diarrhea and rectal pain.   Endocrine: Negative for cold intolerance, heat intolerance, polydipsia, polyphagia and polyuria.   Genitourinary:  Negative for dysuria, flank pain, hematuria and urgency.   Musculoskeletal:  Negative for arthralgias, gait problem, myalgias and neck stiffness.   Skin:  Negative for color change and rash.   Allergic/Immunologic: Negative for environmental allergies and food allergies.   Neurological:  Negative for dizziness, seizures, syncope, speech difficulty and headaches.   Hematological:  Negative for adenopathy.   Psychiatric/Behavioral:  Negative for agitation, confusion, decreased concentration, dysphoric mood and sleep disturbance. The patient is not nervous/anxious.   "      Objective   Vitals:  /80 (BP Location: Right arm, Patient Position: Sitting, BP Cuff Size: Adult)   Pulse 80   Temp 36.1 °C (97 °F)   Resp 15   Ht 1.626 m (5' 4\")   Wt 55.2 kg (121 lb 9.6 oz)   SpO2 98%   BMI 20.87 kg/m²       Physical Exam  Vitals reviewed.   Constitutional:       General: She is not in acute distress.     Appearance: Normal appearance. She is normal weight. She is not ill-appearing or diaphoretic.   HENT:      Head: Normocephalic.      Right Ear: Tympanic membrane and external ear normal.      Left Ear: Tympanic membrane and external ear normal.      Nose: Nose normal. No congestion.      Mouth/Throat:      Pharynx: No posterior oropharyngeal erythema.   Eyes:      General:         Right eye: No discharge.         Left eye: No discharge.      Extraocular Movements: Extraocular movements intact.      Conjunctiva/sclera: Conjunctivae normal.      Pupils: Pupils are equal, round, and reactive to light.   Cardiovascular:      Rate and Rhythm: Normal rate and regular rhythm.      Pulses: Normal pulses.      Heart sounds: Normal heart sounds. No murmur heard.  Pulmonary:      Effort: Pulmonary effort is normal. No respiratory distress.      Breath sounds: Normal breath sounds. No wheezing or rales.   Chest:      Chest wall: No tenderness.   Abdominal:      General: Abdomen is flat. Bowel sounds are normal. There is no distension.      Palpations: There is no mass.      Tenderness: There is no abdominal tenderness. There is no guarding.   Musculoskeletal:         General: No tenderness. Normal range of motion.      Cervical back: Normal range of motion and neck supple. No tenderness.      Right lower leg: No edema.      Left lower leg: No edema.   Skin:     General: Skin is dry.      Coloration: Skin is not jaundiced.      Findings: No bruising, erythema or rash.   Neurological:      General: No focal deficit present.      Mental Status: She is alert and oriented to person, place, and " time. Mental status is at baseline.      Cranial Nerves: No cranial nerve deficit.      Sensory: No sensory deficit.      Coordination: Coordination normal.      Gait: Gait normal.   Psychiatric:         Mood and Affect: Mood normal.         Thought Content: Thought content normal.         Judgment: Judgment normal.         Assessment/Plan   Problem List Items Addressed This Visit       Osteoporosis    Relevant Medications    denosumab (Prolia) 60 mg/mL syringe     Other Visit Diagnoses       Encounter for Medicare annual wellness exam    -  Primary    Routine general medical examination at health care facility        ACP (advance care planning)        Encounter for screening mammogram for breast cancer        Relevant Orders    BI mammo bilateral screening tomosynthesis    Allergy, sequela        Relevant Orders    Respiratory Allergy Profile IgE (Completed)    Folate (Completed)    Vitamin B12 (Completed)    Iron and TIBC (Completed)    Allergy, unspecified, initial encounter        Relevant Orders    Respiratory Allergy Profile IgE (Completed)    Anemia due to vitamin B12 deficiency, unspecified B12 deficiency type        Relevant Orders    Iron and TIBC (Completed)              Scribe Attestation  By signing my name below, IArely RMA , Scribe   attest that this documentation has been prepared under the direction and in the presence of Mathew Batista DO.   Provider Attestation - Scribe documentation    All medical record entries made by the Scribe were at my direction and personally dictated by me. I have reviewed the chart and agree that the record accurately reflects my personal performance of the history, physical exam, discussion and plan.    Patient was identified as a fall risk. Risk prevention instructions provided.

## 2023-11-10 NOTE — PATIENT INSTRUCTIONS
Follow up in 3 weeks    Continue current medications and therapy for chronic medical conditions.    Patient was advised importance of proper diet/nutrition in addition adequate hydration. Patient was encouraged moderate exercise program to include 30 minutes daily for 5 days of the week or 150 minutes weekly. Patient will follow-up with us as scheduled.    Complete Medicare annual wellness physical/exam     PROLIA ORDERED     CONSIDER FOSAMAX IF PROLIA NOT APPROVED     OBTAIN RESPIRATORY PROFILE, IRON/TIBC, FOLATE, B12    Schedule screening mammogram    Counseled on advanced directives        Ways to Help Prevent Falls at Home    Quick Tips   ? Ask for help if you need it. Most people want to help!   ? Get up slowly after sitting or laying down   ? Wear a medical alert device or keep cell phone in your pocket   ? Use night lights, especially areas near a bathroom   ? Keep the items you use often within reach on a small stool or end table   ? Use an assistive device such as walker or cane, as directed by provider/physical therapy   ? Use a non-slip mat and grab bars in your bathroom. Look for home health sections for best options     Other Areas to Focus On   ? Exercise and nutrition: Regular exercise or taking a falls prevention class are great ways improve strength and balance. Don’t forget to stay hydrated and bring a snack!   ? Medicine side effects: Some medicines can make you sleepy or dizzy, which could cause a fall. Ask your healthcare provider about the side effects your medicines could cause. Be sure to let them know if you take any vitamins or supplements as well.   ? Tripping hazards: Remove items you could trip on, such as loose mats, rugs, cords, and clutter. Wear closed toe shoes with rubber soles.   ? Health and wellness: Get regular checkups with your healthcare provider, plus routine vision and hearing screenings. Talk with your healthcare provider about:   o Your medicines and the possible side  effects - bring them in a bag if that is easier!   o Problems with balance or feeling dizzy   o Ways to promote bone health, such as Vitamin D and calcium supplements   o Questions or concerns about falling     *Ask your healthcare team if you have questions     ©[facility], 2022

## 2023-11-11 ENCOUNTER — LAB (OUTPATIENT)
Dept: LAB | Facility: LAB | Age: 68
End: 2023-11-11
Payer: MEDICARE

## 2023-11-11 DIAGNOSIS — T78.40XA ALLERGY, UNSPECIFIED, INITIAL ENCOUNTER: ICD-10-CM

## 2023-11-11 DIAGNOSIS — D51.9 ANEMIA DUE TO VITAMIN B12 DEFICIENCY, UNSPECIFIED B12 DEFICIENCY TYPE: ICD-10-CM

## 2023-11-11 DIAGNOSIS — T78.40XS ALLERGY, SEQUELA: ICD-10-CM

## 2023-11-11 LAB
FOLATE SERPL-MCNC: >22.3 NG/ML
IRON SATN MFR SERPL: 25 % (ref 25–45)
IRON SERPL-MCNC: 93 UG/DL (ref 35–150)
TIBC SERPL-MCNC: 370 UG/DL (ref 240–445)
UIBC SERPL-MCNC: 277 UG/DL (ref 110–370)
VIT B12 SERPL-MCNC: 300 PG/ML (ref 211–911)

## 2023-11-11 PROCEDURE — 82746 ASSAY OF FOLIC ACID SERUM: CPT

## 2023-11-11 PROCEDURE — 82785 ASSAY OF IGE: CPT

## 2023-11-11 PROCEDURE — 82607 VITAMIN B-12: CPT

## 2023-11-11 PROCEDURE — 36415 COLL VENOUS BLD VENIPUNCTURE: CPT

## 2023-11-11 PROCEDURE — 83540 ASSAY OF IRON: CPT

## 2023-11-11 PROCEDURE — 83550 IRON BINDING TEST: CPT

## 2023-11-11 PROCEDURE — 86003 ALLG SPEC IGE CRUDE XTRC EA: CPT

## 2023-11-12 LAB

## 2023-11-15 ENCOUNTER — TELEPHONE (OUTPATIENT)
Dept: PRIMARY CARE | Facility: CLINIC | Age: 68
End: 2023-11-15
Payer: MEDICARE

## 2023-11-15 ENCOUNTER — HOSPITAL ENCOUNTER (OUTPATIENT)
Dept: RADIOLOGY | Facility: HOSPITAL | Age: 68
Discharge: HOME | End: 2023-11-15
Payer: MEDICARE

## 2023-11-15 DIAGNOSIS — T78.40XS ALLERGY, SEQUELA: ICD-10-CM

## 2023-11-15 DIAGNOSIS — Z12.31 ENCOUNTER FOR SCREENING MAMMOGRAM FOR BREAST CANCER: ICD-10-CM

## 2023-11-15 PROCEDURE — 77067 SCR MAMMO BI INCL CAD: CPT | Performed by: RADIOLOGY

## 2023-11-15 PROCEDURE — 77063 BREAST TOMOSYNTHESIS BI: CPT

## 2023-11-15 PROCEDURE — 77063 BREAST TOMOSYNTHESIS BI: CPT | Performed by: RADIOLOGY

## 2023-11-15 NOTE — TELEPHONE ENCOUNTER
Called pt back to schedule allergy referral. Very upset and said she didn't want to schedule that until she talked to you about her abnormal lab results.   Requesting a call back from you tomorrow.

## 2023-11-15 NOTE — TELEPHONE ENCOUNTER
----- Message from Mathew Batista DO sent at 11/12/2023 10:42 PM EST -----  Interesting, environmental allergies panel is essentially unremarkable.  However, total IgE is positive.  We will refer to allergy at this time for further testing.  Thank you

## 2023-11-17 ENCOUNTER — DOCUMENTATION (OUTPATIENT)
Dept: INFUSION THERAPY | Facility: CLINIC | Age: 68
End: 2023-11-17
Payer: MEDICARE

## 2023-11-17 DIAGNOSIS — M80.00XD AGE-RELATED OSTEOPOROSIS WITH CURRENT PATHOLOGICAL FRACTURE WITH ROUTINE HEALING, SUBSEQUENT ENCOUNTER: Primary | ICD-10-CM

## 2023-11-17 RX ORDER — DIPHENHYDRAMINE HYDROCHLORIDE 50 MG/ML
50 INJECTION INTRAMUSCULAR; INTRAVENOUS AS NEEDED
OUTPATIENT
Start: 2023-11-20

## 2023-11-17 RX ORDER — EPINEPHRINE 0.3 MG/.3ML
0.3 INJECTION SUBCUTANEOUS EVERY 5 MIN PRN
OUTPATIENT
Start: 2023-11-20

## 2023-11-17 RX ORDER — FAMOTIDINE 10 MG/ML
20 INJECTION INTRAVENOUS ONCE AS NEEDED
OUTPATIENT
Start: 2023-11-20

## 2023-11-17 RX ORDER — ALBUTEROL SULFATE 0.83 MG/ML
3 SOLUTION RESPIRATORY (INHALATION) AS NEEDED
OUTPATIENT
Start: 2023-11-20

## 2023-11-17 NOTE — PROGRESS NOTES
Patient to be scheduled for ( new start  ) of Prolia injections.  For Diagnosis: Osteoporosis  Labs..  CMP / Calcium drawn on: 9/30/23 (within 28 days of injection)  Ionized or Serum Calcium Results: 9.8 (>8.6mg/dl or ionized calcium WNL.  Hold / Contact provider if <8.6)  Calcium and Vitamin D supplement on medication list? Yes   (if no nurse to encourage discussion of supplementation at visit)  No obvious recent dental work per chart review. Nurse to confirm no dental within past/next 4 weeks at encounter.  Urine Hcg test ordered? N/A (If female pt <60 years of age and with reproductive capability)  (If urine Hcg test ordered please instruct pt upon scheduling to drink 32 ounces of water 1 hour before arrival so bladder is full for needed urine sample)  Last injection received: ___ (if continuation)    Due: ANYTIME    This result meets treatment criteria. Ok to schedule for prolia; please instruct pt to have labs drawn no more than 28 days prior to their scheduled appointment

## 2023-11-28 ENCOUNTER — APPOINTMENT (OUTPATIENT)
Dept: ORTHOPEDIC SURGERY | Facility: CLINIC | Age: 68
End: 2023-11-28
Payer: MEDICARE

## 2023-11-30 ENCOUNTER — TELEMEDICINE (OUTPATIENT)
Dept: PRIMARY CARE | Facility: CLINIC | Age: 68
End: 2023-11-30
Payer: MEDICARE

## 2023-11-30 VITALS — HEIGHT: 64 IN | BODY MASS INDEX: 20.87 KG/M2

## 2023-11-30 DIAGNOSIS — F41.1 GENERALIZED ANXIETY DISORDER: ICD-10-CM

## 2023-11-30 DIAGNOSIS — J01.91 ACUTE RECURRENT SINUSITIS, UNSPECIFIED LOCATION: Primary | ICD-10-CM

## 2023-11-30 PROCEDURE — 99442 PR PHYS/QHP TELEPHONE EVALUATION 11-20 MIN: CPT | Performed by: FAMILY MEDICINE

## 2023-11-30 RX ORDER — AMOXICILLIN AND CLAVULANATE POTASSIUM 875; 125 MG/1; MG/1
875 TABLET, FILM COATED ORAL 2 TIMES DAILY
Qty: 20 TABLET | Refills: 0 | Status: SHIPPED | OUTPATIENT
Start: 2023-11-30 | End: 2023-12-10

## 2023-11-30 RX ORDER — AZELASTINE 1 MG/ML
1 SPRAY, METERED NASAL 2 TIMES DAILY
Qty: 30 ML | Refills: 12 | Status: SHIPPED | OUTPATIENT
Start: 2023-11-30

## 2023-11-30 ASSESSMENT — ENCOUNTER SYMPTOMS
DIZZINESS: 0
RHINORRHEA: 0
HEADACHES: 1
RECTAL PAIN: 0
CHEST TIGHTNESS: 0
CONFUSION: 0
ADENOPATHY: 0
APPETITE CHANGE: 0
AGITATION: 0
ARTHRALGIAS: 0
COLOR CHANGE: 0
SPEECH DIFFICULTY: 0
SLEEP DISTURBANCE: 0
POLYDIPSIA: 0
DIARRHEA: 0
NERVOUS/ANXIOUS: 0
DYSURIA: 0
FEVER: 0
STRIDOR: 0
SHORTNESS OF BREATH: 0
MYALGIAS: 0
SINUS COMPLAINT: 1
SINUS PRESSURE: 0
ABDOMINAL PAIN: 0
TROUBLE SWALLOWING: 0
EYE PAIN: 0
HEMATURIA: 0
SORE THROAT: 0
ABDOMINAL DISTENTION: 0
CONSTITUTIONAL NEGATIVE: 1
CONSTIPATION: 0
COUGH: 0
PALPITATIONS: 0
SEIZURES: 0
NECK STIFFNESS: 0
DECREASED CONCENTRATION: 0
FATIGUE: 0
ACTIVITY CHANGE: 0
PHOTOPHOBIA: 0
FLANK PAIN: 0
DYSPHORIC MOOD: 0
SINUS PAIN: 0
POLYPHAGIA: 0
BLOOD IN STOOL: 0

## 2023-11-30 NOTE — PATIENT INSTRUCTIONS
Follow up in 4 weeks    Continue current medications and therapy for chronic medical conditions.    Patient was advised importance of proper diet/nutrition in addition adequate hydration. Patient was encouraged moderate exercise program to include 30 minutes daily for 5 days of the week or 150 minutes weekly. Patient will follow-up with us as scheduled.    ENT referral    Start augmentin 875 mg twice daily x 10    Start azelastine nasal spray 1 spray intranasally twice daily

## 2023-11-30 NOTE — PROGRESS NOTES
Subjective   Patient ID: Tonie Polanco is a 68 y.o. female who presents for URI and Sinus Problem.    Patient is here for sinus infection.    Patient states having sinus symptoms from Monday. Patient states having mucus on the right nasal nostril with blood and yellow color in the left nasal nostril. Patient states having a severe pressure and headaches. Patient takes Advil for the headache but does not takes any medication for the other symptoms.    Patient denies other symptoms or concerns today.    Sinus Problem  This is a recurrent problem. The current episode started 1 to 4 weeks ago. The problem is unchanged. There has been no fever. Associated symptoms include congestion and headaches. Pertinent negatives include no coughing, ear pain, shortness of breath, sinus pressure or sore throat. Past treatments include nothing. The treatment provided no relief.        Review of Systems   Constitutional: Negative.  Negative for activity change, appetite change, fatigue and fever.   HENT:  Positive for congestion. Negative for dental problem, ear discharge, ear pain, mouth sores, rhinorrhea, sinus pressure, sinus pain, sore throat, tinnitus and trouble swallowing.    Eyes:  Negative for photophobia, pain and visual disturbance.   Respiratory:  Negative for cough, chest tightness, shortness of breath and stridor.    Cardiovascular:  Negative for chest pain and palpitations.   Gastrointestinal:  Negative for abdominal distention, abdominal pain, blood in stool, constipation, diarrhea and rectal pain.   Endocrine: Negative for cold intolerance, heat intolerance, polydipsia, polyphagia and polyuria.   Genitourinary:  Negative for dysuria, flank pain, hematuria and urgency.   Musculoskeletal:  Negative for arthralgias, gait problem, myalgias and neck stiffness.   Skin:  Negative for color change and rash.   Allergic/Immunologic: Negative for environmental allergies and food allergies.   Neurological:  Positive for  "headaches. Negative for dizziness, seizures, syncope and speech difficulty.   Hematological:  Negative for adenopathy.   Psychiatric/Behavioral:  Negative for agitation, confusion, decreased concentration, dysphoric mood and sleep disturbance. The patient is not nervous/anxious.        Objective   Ht 1.626 m (5' 4\")   BMI 20.87 kg/m²     Physical Exam  Constitutional: Well developed, well nourished, alert and in no acute distress   Psychiatric: Mood calm and affect normal    Telephone Visit - Audio Communication Only     Assessment/Plan   Problem List Items Addressed This Visit             ICD-10-CM    Generalized anxiety disorder F41.1     Other Visit Diagnoses         Codes    Acute recurrent sinusitis, unspecified location    -  Primary J01.91    Relevant Medications    amoxicillin-pot clavulanate (Augmentin) 875-125 mg tablet    azelastine (Astelin) 137 mcg (0.1 %) nasal spray    Other Relevant Orders    Referral to ENT               "

## 2023-12-08 ENCOUNTER — ANCILLARY PROCEDURE (OUTPATIENT)
Dept: RADIOLOGY | Facility: CLINIC | Age: 68
End: 2023-12-08
Payer: MEDICARE

## 2023-12-08 ENCOUNTER — OFFICE VISIT (OUTPATIENT)
Dept: ORTHOPEDIC SURGERY | Facility: CLINIC | Age: 68
End: 2023-12-08
Payer: MEDICARE

## 2023-12-08 ENCOUNTER — TELEPHONE (OUTPATIENT)
Dept: INFUSION THERAPY | Facility: CLINIC | Age: 68
End: 2023-12-08
Payer: MEDICARE

## 2023-12-08 DIAGNOSIS — Z96.642 STATUS POST LEFT HIP REPLACEMENT: ICD-10-CM

## 2023-12-08 PROCEDURE — 1036F TOBACCO NON-USER: CPT | Performed by: PHYSICIAN ASSISTANT

## 2023-12-08 PROCEDURE — 1159F MED LIST DOCD IN RCRD: CPT | Performed by: PHYSICIAN ASSISTANT

## 2023-12-08 PROCEDURE — 1160F RVW MEDS BY RX/DR IN RCRD: CPT | Performed by: PHYSICIAN ASSISTANT

## 2023-12-08 PROCEDURE — 73502 X-RAY EXAM HIP UNI 2-3 VIEWS: CPT | Mod: LT,FY

## 2023-12-08 PROCEDURE — 1126F AMNT PAIN NOTED NONE PRSNT: CPT | Performed by: PHYSICIAN ASSISTANT

## 2023-12-08 PROCEDURE — 99213 OFFICE O/P EST LOW 20 MIN: CPT | Performed by: PHYSICIAN ASSISTANT

## 2023-12-08 PROCEDURE — 99213 OFFICE O/P EST LOW 20 MIN: CPT | Mod: PO | Performed by: PHYSICIAN ASSISTANT

## 2023-12-08 PROCEDURE — 73502 X-RAY EXAM HIP UNI 2-3 VIEWS: CPT | Mod: LEFT SIDE | Performed by: ORTHOPAEDIC SURGERY

## 2023-12-09 RX ORDER — METHYLPREDNISOLONE 4 MG/1
TABLET ORAL
Qty: 21 TABLET | Refills: 0 | Status: SHIPPED | OUTPATIENT
Start: 2023-12-09

## 2023-12-10 NOTE — PROGRESS NOTES
Subjective    Patient ID: Tonie    Chief Complaint:   Chief Complaint   Patient presents with    Left Hip - Follow-up, Pain     LT TH-Revision 11/21/22  X-rays today     History of present illness    68-year-old female presented to clinic today for follow-up evaluation status post left total hip revision she is proximately 1 year postop.  She states overall left hip is doing very well.  She still gets occasional bursa pain over the left side.  No groin pain.  She has no difficulty with weightbearing.  No difficulty with sleeping.      Past medical , Surgical, Family and social history reviewed.      Physical exam  General: No acute distress and breathing comfortably.  Patient is pleasant and cooperative with the examination.    Extremity  Left hip is neurovascular intact.  No signs of infection.  Mild tenderness palpation over the left hip bursa.  Negative straight leg raise test.  Good range of motion without pain in the left groin.  Good strength left lower extremity.  No DVT.    Diagnostics  [ none]    Procedure  [ none]    Assessment  Status post left total hip revision  Mild left hip bursitis    Treatment plan  1.  We discussed possibility of cortisone injection versus another Medrol Dosepak  2.  Medrol Dosepak seem to help her quite a bit so we will go ahead and send another refill over to her pharmacy.  3.  She will continue with stretching exercises at home on her own.  Follow-up with us if she needs anything in the future.  4.  All of the patient's questions were answered.    This note was prepared using voice recognition software.  The details of this note are correct and have been reviewed, and corrected to the best of my ability.  Some grammatical areas may persist related to the Dragon software    Cas Medina PA-C, [facility]  [facility]  [facility] [place]    [phone]

## 2024-01-08 ENCOUNTER — CLINICAL SUPPORT (OUTPATIENT)
Dept: PRIMARY CARE | Facility: CLINIC | Age: 69
End: 2024-01-08
Payer: MEDICARE

## 2024-01-08 DIAGNOSIS — J30.9 ALLERGIC RHINITIS, UNSPECIFIED SEASONALITY, UNSPECIFIED TRIGGER: ICD-10-CM

## 2024-01-08 PROCEDURE — 96372 THER/PROPH/DIAG INJ SC/IM: CPT | Performed by: FAMILY MEDICINE

## 2024-01-08 RX ORDER — TRIAMCINOLONE ACETONIDE 40 MG/ML
80 INJECTION, SUSPENSION INTRA-ARTICULAR; INTRAMUSCULAR ONCE
Status: COMPLETED | OUTPATIENT
Start: 2024-01-08 | End: 2024-01-08

## 2024-01-08 RX ADMIN — TRIAMCINOLONE ACETONIDE 80 MG: 40 INJECTION, SUSPENSION INTRA-ARTICULAR; INTRAMUSCULAR at 09:25

## 2024-01-09 DIAGNOSIS — J06.9 VIRAL UPPER RESPIRATORY TRACT INFECTION: ICD-10-CM

## 2024-01-09 RX ORDER — AZITHROMYCIN 250 MG/1
TABLET, FILM COATED ORAL
Qty: 6 TABLET | Refills: 0 | Status: SHIPPED | OUTPATIENT
Start: 2024-01-09 | End: 2024-01-14

## 2024-01-15 ENCOUNTER — APPOINTMENT (OUTPATIENT)
Dept: INFUSION THERAPY | Facility: CLINIC | Age: 69
End: 2024-01-15
Payer: MEDICARE

## 2024-01-24 ENCOUNTER — APPOINTMENT (OUTPATIENT)
Dept: ALLERGY | Facility: CLINIC | Age: 69
End: 2024-01-24
Payer: MEDICARE

## 2024-02-12 DIAGNOSIS — R39.9 UTI SYMPTOMS: ICD-10-CM

## 2024-02-12 RX ORDER — NITROFURANTOIN 25; 75 MG/1; MG/1
100 CAPSULE ORAL 2 TIMES DAILY
Qty: 20 CAPSULE | Refills: 0 | Status: SHIPPED | OUTPATIENT
Start: 2024-02-12

## 2024-02-12 NOTE — TELEPHONE ENCOUNTER
Dr null pt  Pt has an uti was wondering if chaka can call something in   Pharmacy    [facility] [address], [place] - [address] [place] [address]

## 2024-02-21 ENCOUNTER — OFFICE VISIT (OUTPATIENT)
Dept: PRIMARY CARE | Facility: CLINIC | Age: 69
End: 2024-02-21
Payer: MEDICARE

## 2024-02-21 VITALS
RESPIRATION RATE: 15 BRPM | WEIGHT: 122.4 LBS | TEMPERATURE: 98.3 F | DIASTOLIC BLOOD PRESSURE: 70 MMHG | SYSTOLIC BLOOD PRESSURE: 126 MMHG | HEART RATE: 68 BPM | HEIGHT: 64 IN | OXYGEN SATURATION: 98 % | BODY MASS INDEX: 20.9 KG/M2

## 2024-02-21 DIAGNOSIS — E78.5 DYSLIPIDEMIA: ICD-10-CM

## 2024-02-21 DIAGNOSIS — I10 PRIMARY HYPERTENSION: ICD-10-CM

## 2024-02-21 DIAGNOSIS — J01.01 ACUTE RECURRENT MAXILLARY SINUSITIS: Primary | ICD-10-CM

## 2024-02-21 PROBLEM — M70.21 OLECRANON BURSITIS OF RIGHT ELBOW: Status: RESOLVED | Noted: 2023-08-19 | Resolved: 2024-02-21

## 2024-02-21 PROCEDURE — 99213 OFFICE O/P EST LOW 20 MIN: CPT | Performed by: FAMILY MEDICINE

## 2024-02-21 RX ORDER — ATENOLOL 25 MG/1
25 TABLET ORAL DAILY
Qty: 90 TABLET | Refills: 1 | Status: SHIPPED | OUTPATIENT
Start: 2024-02-21 | End: 2024-08-19

## 2024-02-21 RX ORDER — PREDNISOLONE ACETATE 10 MG/ML
SUSPENSION/ DROPS OPHTHALMIC
COMMUNITY
Start: 2024-02-08

## 2024-02-21 RX ORDER — NEOMYCIN SULFATE, POLYMYXIN B SULFATE AND HYDROCORTISONE 10; 3.5; 1 MG/ML; MG/ML; [USP'U]/ML
3 SUSPENSION/ DROPS AURICULAR (OTIC) EVERY 6 HOURS
COMMUNITY
Start: 2021-08-09

## 2024-02-21 RX ORDER — AMOXICILLIN AND CLAVULANATE POTASSIUM 875; 125 MG/1; MG/1
875 TABLET, FILM COATED ORAL 2 TIMES DAILY
Qty: 20 TABLET | Refills: 0 | Status: SHIPPED | OUTPATIENT
Start: 2024-02-21 | End: 2024-03-02

## 2024-02-21 RX ORDER — GENTAMICIN SULFATE 3 MG/ML
1 SOLUTION/ DROPS OPHTHALMIC 4 TIMES DAILY
COMMUNITY
Start: 2024-02-08

## 2024-02-21 ASSESSMENT — ENCOUNTER SYMPTOMS
ACTIVITY CHANGE: 0
TROUBLE SWALLOWING: 0
NECK STIFFNESS: 0
ADENOPATHY: 0
SINUS PRESSURE: 1
RHINORRHEA: 0
BLOOD IN STOOL: 0
ABDOMINAL DISTENTION: 0
SEIZURES: 0
POLYDIPSIA: 0
FEVER: 0
FLANK PAIN: 0
DIARRHEA: 0
CONSTIPATION: 0
RECTAL PAIN: 0
SHORTNESS OF BREATH: 0
COUGH: 0
APPETITE CHANGE: 0
HEADACHES: 1
FATIGUE: 0
STRIDOR: 0
EYE PAIN: 0
DYSURIA: 0
DYSPHORIC MOOD: 0
DECREASED CONCENTRATION: 0
PALPITATIONS: 0
CONFUSION: 0
PHOTOPHOBIA: 0
ARTHRALGIAS: 0
HEMATURIA: 0
SPEECH DIFFICULTY: 0
MYALGIAS: 0
SORE THROAT: 0
SINUS PRESSURE: 0
POLYPHAGIA: 0
CONSTITUTIONAL NEGATIVE: 1
SINUS COMPLAINT: 1
AGITATION: 0
ABDOMINAL PAIN: 0
SINUS PAIN: 0
CHEST TIGHTNESS: 0
COLOR CHANGE: 0
NERVOUS/ANXIOUS: 0
DIZZINESS: 0
SLEEP DISTURBANCE: 0
HEADACHES: 0

## 2024-02-21 NOTE — PATIENT INSTRUCTIONS
Follow up in 4 weeks    Continue current medications and therapy for chronic medical conditions.    Patient was advised importance of proper diet/nutrition in addition adequate hydration. Patient was encouraged moderate exercise program to include 30 minutes daily for 5 days of the week or 150 minutes weekly. Patient will follow-up with us as scheduled.    Start Augmentin 875 mg twice daily x 10    Decrease atenolol to 25 mg daily      Ways to Help Prevent Falls at Home    Quick Tips   ? Ask for help if you need it. Most people want to help!   ? Get up slowly after sitting or laying down   ? Wear a medical alert device or keep cell phone in your pocket   ? Use night lights, especially areas near a bathroom   ? Keep the items you use often within reach on a small stool or end table   ? Use an assistive device such as walker or cane, as directed by provider/physical therapy   ? Use a non-slip mat and grab bars in your bathroom. Look for home health sections for best options     Other Areas to Focus On   ? Exercise and nutrition: Regular exercise or taking a falls prevention class are great ways improve strength and balance. Don’t forget to stay hydrated and bring a snack!   ? Medicine side effects: Some medicines can make you sleepy or dizzy, which could cause a fall. Ask your healthcare provider about the side effects your medicines could cause. Be sure to let them know if you take any vitamins or supplements as well.   ? Tripping hazards: Remove items you could trip on, such as loose mats, rugs, cords, and clutter. Wear closed toe shoes with rubber soles.   ? Health and wellness: Get regular checkups with your healthcare provider, plus routine vision and hearing screenings. Talk with your healthcare provider about:   o Your medicines and the possible side effects - bring them in a bag if that is easier!   o Problems with balance or feeling dizzy   o Ways to promote bone health, such as Vitamin D and calcium  supplements   o Questions or concerns about falling     *Ask your healthcare team if you have questions     ©[facility], 2022

## 2024-02-21 NOTE — PROGRESS NOTES
Subjective   Patient ID: Tonie Polanco is a 68 y.o. female who presents for Sinus Problem.    Sinus Problem  Pertinent negatives include no congestion, coughing, ear pain, headaches, shortness of breath, sinus pressure or sore throat.    patient comes in complaining of acute facial pain.  She was seen by ophthalmology for cataract surgery and was told her symptoms are mostly secondary to sinus infection.  Patient was given Keflex for short duration and wishes to continue on antibiotic.    Review of Systems   Constitutional: Negative.  Negative for activity change, appetite change, fatigue and fever.   HENT:  Negative for congestion, dental problem, ear discharge, ear pain, mouth sores, rhinorrhea, sinus pressure, sinus pain, sore throat, tinnitus and trouble swallowing.    Eyes:  Negative for photophobia, pain and visual disturbance.   Respiratory:  Negative for cough, chest tightness, shortness of breath and stridor.    Cardiovascular:  Negative for chest pain and palpitations.   Gastrointestinal:  Negative for abdominal distention, abdominal pain, blood in stool, constipation, diarrhea and rectal pain.   Endocrine: Negative for cold intolerance, heat intolerance, polydipsia, polyphagia and polyuria.   Genitourinary:  Negative for dysuria, flank pain, hematuria and urgency.   Musculoskeletal:  Negative for arthralgias, gait problem, myalgias and neck stiffness.   Skin:  Negative for color change and rash.   Allergic/Immunologic: Negative for environmental allergies and food allergies.   Neurological:  Negative for dizziness, seizures, syncope, speech difficulty and headaches.   Hematological:  Negative for adenopathy.   Psychiatric/Behavioral:  Negative for agitation, confusion, decreased concentration, dysphoric mood and sleep disturbance. The patient is not nervous/anxious.        Objective   /70 (BP Location: Right arm, Patient Position: Sitting, BP Cuff Size: Adult)   Pulse 68   Temp 36.8 °C (98.3 °F)   " Resp 15   Ht 1.626 m (5' 4\")   Wt 55.5 kg (122 lb 6.4 oz)   SpO2 98%   BMI 21.01 kg/m²     Physical Exam  Vitals reviewed.   Constitutional:       General: She is not in acute distress.     Appearance: Normal appearance. She is normal weight. She is not ill-appearing or diaphoretic.   HENT:      Head: Normocephalic.      Comments: Moderate tenderness over the left maxillary sinus without facial swelling     Right Ear: Tympanic membrane and external ear normal.      Left Ear: Tympanic membrane and external ear normal.      Nose: Nose normal. No congestion.      Mouth/Throat:      Pharynx: No posterior oropharyngeal erythema.   Eyes:      General:         Right eye: No discharge.         Left eye: No discharge.      Extraocular Movements: Extraocular movements intact.      Conjunctiva/sclera: Conjunctivae normal.      Pupils: Pupils are equal, round, and reactive to light.   Cardiovascular:      Rate and Rhythm: Normal rate and regular rhythm.      Pulses: Normal pulses.      Heart sounds: Normal heart sounds. No murmur heard.  Pulmonary:      Effort: Pulmonary effort is normal. No respiratory distress.      Breath sounds: Normal breath sounds. No wheezing or rales.   Chest:      Chest wall: No tenderness.   Abdominal:      General: Abdomen is flat. Bowel sounds are normal. There is no distension.      Palpations: There is no mass.      Tenderness: There is no abdominal tenderness. There is no guarding.   Musculoskeletal:         General: No tenderness. Normal range of motion.      Cervical back: Normal range of motion and neck supple. No tenderness.      Right lower leg: No edema.      Left lower leg: No edema.   Skin:     General: Skin is dry.      Coloration: Skin is not jaundiced.      Findings: No bruising, erythema or rash.   Neurological:      General: No focal deficit present.      Mental Status: She is alert and oriented to person, place, and time. Mental status is at baseline.      Cranial Nerves: No " cranial nerve deficit.      Sensory: No sensory deficit.      Coordination: Coordination normal.      Gait: Gait normal.   Psychiatric:         Mood and Affect: Mood normal.         Thought Content: Thought content normal.         Judgment: Judgment normal.         Assessment/Plan        Patient was identified as a fall risk. Risk prevention instructions provided.

## 2024-02-21 NOTE — PROGRESS NOTES
Subjective   Patient ID: Tonie Polanco is a 68 y.o. female who presents for Sinus Problem.    Patient is here with sinus pressure.    Patient states had a cataract surgery in her left eye on February 8 2024 and then started with sinus pressure. Patient was prescribed with cephalexin 500 mg but still is not cleared up. Patient would like have other antibiotic. Patient states still using her eyes drops.    Patient would like discuss atenolol 25 mg used. Patient would like just take it for her B/P.    Patient denies any other symptoms or concerns today.    Sinus Problem  This is a recurrent problem. The current episode started 1 to 4 weeks ago. The problem has been gradually improving since onset. There has been no fever. The pain is mild. Associated symptoms include headaches and sinus pressure. Pertinent negatives include no congestion, coughing, ear pain, shortness of breath or sore throat. Past treatments include antibiotics. The treatment provided mild relief.        Review of Systems   Constitutional: Negative.  Negative for activity change, appetite change, fatigue and fever.   HENT:  Positive for sinus pressure. Negative for congestion, dental problem, ear discharge, ear pain, mouth sores, rhinorrhea, sinus pain, sore throat, tinnitus and trouble swallowing.    Eyes:  Negative for photophobia, pain and visual disturbance.   Respiratory:  Negative for cough, chest tightness, shortness of breath and stridor.    Cardiovascular:  Negative for chest pain and palpitations.   Gastrointestinal:  Negative for abdominal distention, abdominal pain, blood in stool, constipation, diarrhea and rectal pain.   Endocrine: Negative for cold intolerance, heat intolerance, polydipsia, polyphagia and polyuria.   Genitourinary:  Negative for dysuria, flank pain, hematuria and urgency.   Musculoskeletal:  Negative for arthralgias, gait problem, myalgias and neck stiffness.   Skin:  Negative for color change and rash.  "  Allergic/Immunologic: Negative for environmental allergies and food allergies.   Neurological:  Positive for headaches. Negative for dizziness, seizures, syncope and speech difficulty.   Hematological:  Negative for adenopathy.   Psychiatric/Behavioral:  Negative for agitation, confusion, decreased concentration, dysphoric mood and sleep disturbance. The patient is not nervous/anxious.        Objective   /70 (BP Location: Right arm, Patient Position: Sitting, BP Cuff Size: Adult)   Pulse 68   Temp 36.8 °C (98.3 °F)   Resp 15   Ht 1.626 m (5' 4\")   Wt 55.5 kg (122 lb 6.4 oz)   SpO2 98%   BMI 21.01 kg/m²     Physical Exam  Vitals reviewed.   Constitutional:       General: She is not in acute distress.     Appearance: Normal appearance. She is normal weight. She is not ill-appearing or diaphoretic.   HENT:      Head: Normocephalic.      Comments: Acute tenderness noted over left maxillary sinus without swelling     Right Ear: Tympanic membrane and external ear normal.      Left Ear: Tympanic membrane and external ear normal.      Nose: Nose normal. No congestion.      Mouth/Throat:      Pharynx: No posterior oropharyngeal erythema.   Eyes:      General:         Right eye: No discharge.         Left eye: No discharge.      Extraocular Movements: Extraocular movements intact.      Conjunctiva/sclera: Conjunctivae normal.      Pupils: Pupils are equal, round, and reactive to light.   Cardiovascular:      Rate and Rhythm: Normal rate and regular rhythm.      Pulses: Normal pulses.      Heart sounds: Normal heart sounds. No murmur heard.  Pulmonary:      Effort: Pulmonary effort is normal. No respiratory distress.      Breath sounds: Normal breath sounds. No wheezing or rales.   Chest:      Chest wall: No tenderness.   Abdominal:      General: Abdomen is flat. Bowel sounds are normal. There is no distension.      Palpations: There is no mass.      Tenderness: There is no abdominal tenderness. There is no " guarding.   Musculoskeletal:         General: No tenderness. Normal range of motion.      Cervical back: Normal range of motion and neck supple. No tenderness.      Right lower leg: No edema.      Left lower leg: No edema.   Skin:     General: Skin is dry.      Coloration: Skin is not jaundiced.      Findings: No bruising, erythema or rash.   Neurological:      General: No focal deficit present.      Mental Status: She is alert and oriented to person, place, and time. Mental status is at baseline.      Cranial Nerves: No cranial nerve deficit.      Sensory: No sensory deficit.      Coordination: Coordination normal.      Gait: Gait normal.   Psychiatric:         Mood and Affect: Mood normal.         Thought Content: Thought content normal.         Judgment: Judgment normal.         Assessment/Plan   Problem List Items Addressed This Visit             ICD-10-CM    Dyslipidemia E78.5    HTN (hypertension) I10     Other Visit Diagnoses         Codes    Acute recurrent maxillary sinusitis    -  Primary J01.01    Relevant Medications    amoxicillin-pot clavulanate (Augmentin) 875-125 mg tablet    atenolol (Tenormin) 25 mg tablet

## 2024-03-10 DIAGNOSIS — E78.5 HYPERLIPIDEMIA, UNSPECIFIED: ICD-10-CM

## 2024-03-11 RX ORDER — SIMVASTATIN 10 MG/1
TABLET, FILM COATED ORAL
Qty: 90 TABLET | Refills: 1 | Status: SHIPPED | OUTPATIENT
Start: 2024-03-11

## 2024-03-16 ENCOUNTER — LAB (OUTPATIENT)
Dept: LAB | Facility: LAB | Age: 69
End: 2024-03-16
Payer: MEDICARE

## 2024-03-16 DIAGNOSIS — M80.00XD AGE-RELATED OSTEOPOROSIS WITH CURRENT PATHOLOGICAL FRACTURE WITH ROUTINE HEALING, SUBSEQUENT ENCOUNTER: ICD-10-CM

## 2024-03-16 LAB
ALBUMIN SERPL BCP-MCNC: 3.9 G/DL (ref 3.4–5)
ALP SERPL-CCNC: 84 U/L (ref 33–136)
ALT SERPL W P-5'-P-CCNC: 17 U/L (ref 7–45)
ANION GAP SERPL CALC-SCNC: 10 MMOL/L (ref 10–20)
AST SERPL W P-5'-P-CCNC: 25 U/L (ref 9–39)
BILIRUB SERPL-MCNC: 0.5 MG/DL (ref 0–1.2)
BUN SERPL-MCNC: 16 MG/DL (ref 6–23)
CA-I BLD-SCNC: 1.22 MMOL/L (ref 1.1–1.33)
CALCIUM SERPL-MCNC: 9.3 MG/DL (ref 8.6–10.3)
CHLORIDE SERPL-SCNC: 107 MMOL/L (ref 98–107)
CO2 SERPL-SCNC: 31 MMOL/L (ref 21–32)
CREAT SERPL-MCNC: 0.8 MG/DL (ref 0.5–1.05)
EGFRCR SERPLBLD CKD-EPI 2021: 80 ML/MIN/1.73M*2
GLUCOSE SERPL-MCNC: 82 MG/DL (ref 74–99)
POTASSIUM SERPL-SCNC: 4.1 MMOL/L (ref 3.5–5.3)
PROT SERPL-MCNC: 7.3 G/DL (ref 6.4–8.2)
SODIUM SERPL-SCNC: 144 MMOL/L (ref 136–145)

## 2024-03-16 PROCEDURE — 80053 COMPREHEN METABOLIC PANEL: CPT

## 2024-03-16 PROCEDURE — 36415 COLL VENOUS BLD VENIPUNCTURE: CPT

## 2024-03-16 PROCEDURE — 82330 ASSAY OF CALCIUM: CPT

## 2024-03-21 ENCOUNTER — OFFICE VISIT (OUTPATIENT)
Dept: PRIMARY CARE | Facility: CLINIC | Age: 69
End: 2024-03-21
Payer: MEDICARE

## 2024-03-21 VITALS
BODY MASS INDEX: 21.17 KG/M2 | OXYGEN SATURATION: 97 % | RESPIRATION RATE: 16 BRPM | WEIGHT: 124 LBS | SYSTOLIC BLOOD PRESSURE: 122 MMHG | DIASTOLIC BLOOD PRESSURE: 66 MMHG | HEIGHT: 64 IN | TEMPERATURE: 98.1 F | HEART RATE: 69 BPM

## 2024-03-21 DIAGNOSIS — F41.1 GENERALIZED ANXIETY DISORDER: ICD-10-CM

## 2024-03-21 DIAGNOSIS — E78.5 DYSLIPIDEMIA: ICD-10-CM

## 2024-03-21 DIAGNOSIS — J01.91 ACUTE RECURRENT SINUSITIS, UNSPECIFIED LOCATION: ICD-10-CM

## 2024-03-21 DIAGNOSIS — G43.019 INTRACTABLE MIGRAINE WITHOUT AURA AND WITHOUT STATUS MIGRAINOSUS: ICD-10-CM

## 2024-03-21 DIAGNOSIS — F43.9 STRESS: ICD-10-CM

## 2024-03-21 DIAGNOSIS — I10 PRIMARY HYPERTENSION: Primary | ICD-10-CM

## 2024-03-21 PROCEDURE — 99213 OFFICE O/P EST LOW 20 MIN: CPT | Performed by: FAMILY MEDICINE

## 2024-03-21 RX ORDER — AZITHROMYCIN 250 MG/1
TABLET, FILM COATED ORAL
Qty: 6 TABLET | Refills: 0 | Status: SHIPPED | OUTPATIENT
Start: 2024-03-21 | End: 2024-03-25

## 2024-03-21 ASSESSMENT — ENCOUNTER SYMPTOMS
FLANK PAIN: 0
NECK STIFFNESS: 0
SINUS PRESSURE: 0
CONSTITUTIONAL NEGATIVE: 1
DYSURIA: 0
DIARRHEA: 0
ARTHRALGIAS: 0
AGITATION: 0
PHOTOPHOBIA: 0
SLEEP DISTURBANCE: 0
EYE PAIN: 0
TROUBLE SWALLOWING: 0
ABDOMINAL DISTENTION: 0
CHEST TIGHTNESS: 0
ADENOPATHY: 0
STRIDOR: 0
ACTIVITY CHANGE: 0
CONSTIPATION: 0
CONFUSION: 0
DYSPHORIC MOOD: 0
HEMATURIA: 0
SORE THROAT: 0
SHORTNESS OF BREATH: 0
COLOR CHANGE: 0
HEADACHES: 0
SPEECH DIFFICULTY: 0
APPETITE CHANGE: 0
FEVER: 0
RECTAL PAIN: 0
SEIZURES: 0
DIZZINESS: 0
BLOOD IN STOOL: 0
FATIGUE: 0
MYALGIAS: 0
RHINORRHEA: 0
NERVOUS/ANXIOUS: 0
POLYPHAGIA: 0
PALPITATIONS: 0
SINUS PAIN: 0
COUGH: 0
HYPERTENSION: 1
POLYDIPSIA: 0
DECREASED CONCENTRATION: 0
ABDOMINAL PAIN: 0

## 2024-03-21 NOTE — PROGRESS NOTES
Subjective   Patient ID: Tonie Polanco is a 68 y.o. female who presents for Hypertension.    Patient would like discuss test results.    Patient had done blood work on 03/16/2024    Patient denies any symptoms or concerns today.    Hypertension  This is a recurrent problem. The current episode started more than 1 year ago. The problem is unchanged. Pertinent negatives include no chest pain, headaches, palpitations or shortness of breath. There are no associated agents to hypertension. There are no known risk factors for coronary artery disease.        Review of Systems   Constitutional: Negative.  Negative for activity change, appetite change, fatigue and fever.   HENT:  Negative for congestion, dental problem, ear discharge, ear pain, mouth sores, rhinorrhea, sinus pressure, sinus pain, sore throat, tinnitus and trouble swallowing.    Eyes:  Negative for photophobia, pain and visual disturbance.   Respiratory:  Negative for cough, chest tightness, shortness of breath and stridor.    Cardiovascular:  Negative for chest pain and palpitations.   Gastrointestinal:  Negative for abdominal distention, abdominal pain, blood in stool, constipation, diarrhea and rectal pain.   Endocrine: Negative for cold intolerance, heat intolerance, polydipsia, polyphagia and polyuria.   Genitourinary:  Negative for dysuria, flank pain, hematuria and urgency.   Musculoskeletal:  Negative for arthralgias, gait problem, myalgias and neck stiffness.   Skin:  Negative for color change and rash.   Allergic/Immunologic: Negative for environmental allergies and food allergies.   Neurological:  Negative for dizziness, seizures, syncope, speech difficulty and headaches.   Hematological:  Negative for adenopathy.   Psychiatric/Behavioral:  Negative for agitation, confusion, decreased concentration, dysphoric mood and sleep disturbance. The patient is not nervous/anxious.        Objective   /66 (BP Location: Right arm, Patient Position:  "Sitting, BP Cuff Size: Adult)   Pulse 69   Temp 36.7 °C (98.1 °F)   Resp 16   Ht 1.626 m (5' 4\")   Wt 56.2 kg (124 lb)   SpO2 97%   BMI 21.28 kg/m²     Physical Exam  Vitals reviewed.   Constitutional:       General: She is not in acute distress.     Appearance: Normal appearance. She is normal weight. She is not ill-appearing or diaphoretic.   HENT:      Head: Normocephalic.      Right Ear: Tympanic membrane and external ear normal.      Left Ear: Tympanic membrane and external ear normal.      Nose: Nose normal. No congestion.      Mouth/Throat:      Pharynx: No posterior oropharyngeal erythema.   Eyes:      General:         Right eye: No discharge.         Left eye: No discharge.      Extraocular Movements: Extraocular movements intact.      Conjunctiva/sclera: Conjunctivae normal.      Pupils: Pupils are equal, round, and reactive to light.   Cardiovascular:      Rate and Rhythm: Normal rate and regular rhythm.      Pulses: Normal pulses.      Heart sounds: Normal heart sounds. No murmur heard.  Pulmonary:      Effort: Pulmonary effort is normal. No respiratory distress.      Breath sounds: Normal breath sounds. No wheezing or rales.   Chest:      Chest wall: No tenderness.   Abdominal:      General: Abdomen is flat. Bowel sounds are normal. There is no distension.      Palpations: There is no mass.      Tenderness: There is no abdominal tenderness. There is no guarding.   Musculoskeletal:         General: No tenderness. Normal range of motion.      Cervical back: Normal range of motion and neck supple. No tenderness.      Right lower leg: No edema.      Left lower leg: No edema.   Skin:     General: Skin is dry.      Coloration: Skin is not jaundiced.      Findings: No bruising, erythema or rash.   Neurological:      General: No focal deficit present.      Mental Status: She is alert and oriented to person, place, and time. Mental status is at baseline.      Cranial Nerves: No cranial nerve deficit.      " Sensory: No sensory deficit.      Coordination: Coordination normal.      Gait: Gait normal.   Psychiatric:         Mood and Affect: Mood normal.         Thought Content: Thought content normal.         Judgment: Judgment normal.         Assessment/Plan   Problem List Items Addressed This Visit             ICD-10-CM    Dyslipidemia E78.5    Relevant Orders    CT cardiac scoring wo IV contrast    Generalized anxiety disorder F41.1    HTN (hypertension) - Primary I10    Stress F43.9    Intractable migraine without aura and without status migrainosus G43.019     Other Visit Diagnoses         Codes    Acute recurrent sinusitis, unspecified location     J01.91    Relevant Medications    azithromycin (Zithromax) 250 mg tablet          Scribe Attestation  By signing my name below, I, Mathew Batista DO , Scribe   attest that this documentation has been prepared under the direction and in the presence of Mathew Batista DO.    Provider Attestation - Scribe documentation    All medical record entries made by the Scribe were at my direction and personally dictated by me. I have reviewed the chart and agree that the record accurately reflects my personal performance of the history, physical exam, discussion and plan.

## 2024-03-21 NOTE — PATIENT INSTRUCTIONS
Follow up 5 days post CT cardiac score with regular follow-up visit in 3 months    Continue current medications and therapy for chronic medical conditions.    Patient was advised importance of proper diet/nutrition in addition adequate hydration. Patient was encouraged moderate exercise program to include 30 minutes daily for 5 days of the week or 150 minutes weekly. Patient will follow-up with us as scheduled.    CT cardiac scoring ordered    Repeat CMP and calcium labs September 2024    See ENT Dr. Tracy 04/01/2024  See allergist Dr. Askew 06/04/2024

## 2024-03-22 ENCOUNTER — OFFICE VISIT (OUTPATIENT)
Dept: PRIMARY CARE | Facility: CLINIC | Age: 69
End: 2024-03-22
Payer: MEDICARE

## 2024-03-22 VITALS
SYSTOLIC BLOOD PRESSURE: 120 MMHG | WEIGHT: 124 LBS | RESPIRATION RATE: 16 BRPM | DIASTOLIC BLOOD PRESSURE: 84 MMHG | HEIGHT: 64 IN | BODY MASS INDEX: 21.17 KG/M2

## 2024-03-22 DIAGNOSIS — M25.429 REDNESS AND SWELLING OF ELBOW: ICD-10-CM

## 2024-03-22 DIAGNOSIS — M00.9: ICD-10-CM

## 2024-03-22 DIAGNOSIS — L03.114 CELLULITIS OF LEFT UPPER EXTREMITY: Primary | ICD-10-CM

## 2024-03-22 DIAGNOSIS — R23.8 REDNESS AND SWELLING OF ELBOW: ICD-10-CM

## 2024-03-22 PROCEDURE — 99213 OFFICE O/P EST LOW 20 MIN: CPT | Performed by: NURSE PRACTITIONER

## 2024-03-22 RX ORDER — DOXYCYCLINE 100 MG/1
100 CAPSULE ORAL 2 TIMES DAILY
Qty: 20 CAPSULE | Refills: 0 | Status: SHIPPED | OUTPATIENT
Start: 2024-03-22 | End: 2024-03-25 | Stop reason: SINTOL

## 2024-03-22 ASSESSMENT — ENCOUNTER SYMPTOMS
FEVER: 0
CHILLS: 0
DIARRHEA: 0
PALPITATIONS: 0
CONSTIPATION: 0
COLOR CHANGE: 1
WHEEZING: 0
OCCASIONAL FEELINGS OF UNSTEADINESS: 0
JOINT SWELLING: 1
ABDOMINAL PAIN: 0
DEPRESSION: 0
VOMITING: 0
HEADACHES: 0
COUGH: 0
WEAKNESS: 0
LOSS OF SENSATION IN FEET: 0
FATIGUE: 0
DIZZINESS: 0
SHORTNESS OF BREATH: 0
WOUND: 0

## 2024-03-22 NOTE — PROGRESS NOTES
"Subjective   Patient ID: Tonie Polanco is a 68 y.o. female who presents for Bursitis (Patient presents in office with a complaint of bursitis. Patient is unsure if she hit her elbow. Patient admits she has been experiencing pains. ).    HPI 68 year old female presents today complaining of redness, swelling and warmth of her left elbow. This started last night. She denies any injury or trauma to the elbow. She has had episodes like this happen in the past in which she was treated with antibiotics. She states that it always occurs in her left elbow. She denies any fever/chills. No history of MRSA.     Review of Systems   Constitutional:  Negative for chills, fatigue and fever.   Respiratory:  Negative for cough, shortness of breath and wheezing.    Cardiovascular:  Negative for chest pain and palpitations.   Gastrointestinal:  Negative for abdominal pain, constipation, diarrhea and vomiting.   Musculoskeletal:  Positive for joint swelling.   Skin:  Positive for color change. Negative for rash and wound.   Neurological:  Negative for dizziness, weakness and headaches.       Objective   /84 (BP Location: Right arm, Patient Position: Sitting, BP Cuff Size: Adult)   Resp 16   Ht 1.626 m (5' 4\")   Wt 56.2 kg (124 lb)   BMI 21.28 kg/m²     Physical Exam  Vitals and nursing note reviewed.   Constitutional:       Appearance: Normal appearance. She is normal weight.   Cardiovascular:      Rate and Rhythm: Normal rate and regular rhythm.      Heart sounds: Normal heart sounds.   Pulmonary:      Effort: Pulmonary effort is normal.      Breath sounds: Normal breath sounds.   Musculoskeletal:         General: Swelling present.      Comments: Left Elbow ROM wnl. Left radial pulse 2+. Sensation intact, cap refill brisk.    Skin:     General: Skin is warm.      Findings: Erythema present. No rash.      Comments: Warmth, swelling and erythema noted over left elbow. No rash or openings in the skin noted.    Psychiatric:    "      Mood and Affect: Mood normal.         Behavior: Behavior normal.         Assessment/Plan   Problem List Items Addressed This Visit    None  Visit Diagnoses         Codes    Cellulitis of left upper extremity    -  Primary L03.114    Relevant Medications    doxycycline (Vibramycin) 100 mg capsule    Infection of left elbow (CMS/HCC)     M00.9    Redness and swelling of elbow     M25.429, R23.8    BMI 21.0-21.9, adult     Z68.21          Will treat with doxycycline. Follow up with PCP in 2-3 days for recheck with any persisting symptoms, or sooner with any additional concerns. If developing any worsening symptoms, pain, swelling, redness, red streaking coming from area or fever/chills, proceed to ER for further evaluation/treatment.

## 2024-03-25 ENCOUNTER — TELEPHONE (OUTPATIENT)
Dept: PRIMARY CARE | Facility: CLINIC | Age: 69
End: 2024-03-25
Payer: MEDICARE

## 2024-03-25 DIAGNOSIS — L03.114 CELLULITIS OF LEFT UPPER EXTREMITY: Primary | ICD-10-CM

## 2024-03-25 RX ORDER — CEPHALEXIN 500 MG/1
500 CAPSULE ORAL 3 TIMES DAILY
Qty: 30 CAPSULE | Refills: 0 | Status: SHIPPED | OUTPATIENT
Start: 2024-03-25 | End: 2024-04-04

## 2024-03-25 NOTE — TELEPHONE ENCOUNTER
Pt phoned office and stated the doxycycline is giving her vision issues and is stopping taking it and wants to know if something else can be called in.

## 2024-04-01 ENCOUNTER — OFFICE VISIT (OUTPATIENT)
Dept: OTOLARYNGOLOGY | Facility: CLINIC | Age: 69
End: 2024-04-01
Payer: MEDICARE

## 2024-04-01 DIAGNOSIS — J32.9 RECURRENT RHINOSINUSITIS: Primary | ICD-10-CM

## 2024-04-01 PROCEDURE — 1159F MED LIST DOCD IN RCRD: CPT | Performed by: OTOLARYNGOLOGY

## 2024-04-01 PROCEDURE — 3008F BODY MASS INDEX DOCD: CPT | Performed by: OTOLARYNGOLOGY

## 2024-04-01 PROCEDURE — 99213 OFFICE O/P EST LOW 20 MIN: CPT | Performed by: OTOLARYNGOLOGY

## 2024-04-01 PROCEDURE — 1160F RVW MEDS BY RX/DR IN RCRD: CPT | Performed by: OTOLARYNGOLOGY

## 2024-04-01 NOTE — PROGRESS NOTES
The patient returns.  Seeing her back today follow-up check on her sinuses.  She has had just 2 infections since we last saw her back in July.  She is doing otherwise fairly well.  Previously we have checked CT scanning as well as immune blood panel and the only issue is some maxillary sinus involvement of a chronic nature with some fluid.  Overall though she is doing pretty well.  She wants to avoid surgery unless absolutely warranted.  There have been no significant changes in past medical or past surgical histories except as mentioned.    Exam:  No acute distress.  The external ear structures appear normal. The ear canals patent and the tympanic membranes are intact without evidence of air-fluid levels, retraction, or congenital defects.  Anterior rhinoscopy notes essentially a midline nasal septum. Examination is noted for normal healthy mucosal membranes without any evidence of lesions, polyps, or exudate. The tongue is normally mobile. There are no lesions on the gingiva, buccal, or oral mucosa. There are no oral cavity masses.  The neck is negative for mass lymphadenopathy. The trachea and parotid are clear. The thyroid bed is grossly unremarkable. The salivary gland structures are grossly unremarkable.    Assessment and plan:  History of recurrent rhinosinusitis in particular about the maxillary sinuses now with just 2 bouts in July so that would not make it roughly 9 months with this number infections and I think that is overall pretty good for her.  Will get a sit tight and observe.  We will see how she fares.  If she gets worse we can certainly reconsider the opportunity for limited endoscopic sinus surgery.  Detailed discussion with patient  in this regard with all questions answered.

## 2024-04-03 ENCOUNTER — HOSPITAL ENCOUNTER (OUTPATIENT)
Dept: RADIOLOGY | Facility: CLINIC | Age: 69
Discharge: HOME | End: 2024-04-03
Payer: MEDICARE

## 2024-04-03 DIAGNOSIS — E78.5 DYSLIPIDEMIA: ICD-10-CM

## 2024-04-03 PROCEDURE — 75571 CT HRT W/O DYE W/CA TEST: CPT

## 2024-04-03 NOTE — LETTER
April 22, 2024     Tonie Polanco  [address] [place] [address] [address]  [place] [id number]      Dear Ms. Polanco:    Below are the results from your recent visit:    CT SCAN INDICATES LOW RISK FOR CARDIOVASCULAR DISEASE    Resulted Orders   CT cardiac scoring wo IV contrast    Narrative    Interpreted By:  Kadie Shaw,   STUDY:  CT CARDIAC SCORING WO IV CONTRAST;  4/3/2024 5:17 pm      INDICATION:  Signs/Symptoms:dyslipidemia.      COMPARISON:  None.      ACCESSION NUMBER(S):  [id number]      ORDERING CLINICIAN:  NAKUL ECHEVERRIA      TECHNIQUE:  Using prospective ECG gating, CT scan of the coronary arteries was  performed without intravenous contrast. Coronary calcium scoring  was  performed according to the method of [place].      FINDINGS:  The score and distribution of calcium in the coronary arteries is as  follows:      LM 0  LAD 0  LCx 0  RCA 0      Total 0      The visualized mid/lower ascending thoracic aorta measures 3.2 cm in  diameter. The heart is normal in size. No pericardial effusion is  present.      No gross evidence of mediastinal or hilar lymphadenopathy or masses  is identified. The visualized segments of the lungs are normally  expanded.      The visualized subdiaphragmatic structures appear intact.        Impression    1. Coronary artery calcium score of 0*.      *Coronary artery calcium scoring may be helpful in predicting the  risk for future coronary heart disease events.  According to the  [employer] Clinical Expert Consensus  Task Force, such testing provides important prognostic information in  patients with more than one coronary heart disease risk factor. The  coronary artery calcium score correlates with the annual risk of a  non-fatal myocardial infarction or coronary heart disease death.      Coronary artery score            Annual Risk      0-99                             0.4%  100-399                        1.3%  >400                            2.4%     "  These three \"breakpoints\" correspond to lower, intermediate and high  risk states for future coronary events.  Such information should be  used, along with appropriate clinical judgment, to make decisions  regarding the intensity of risk factor management strategies to treat  blood lipids and to modify other non-lipid coronary risk factors.      Reference: [place] P et al. Circulation.  2007; 115:402-426      MACRO:  None      Signed by: Kadie Shaw 4/8/2024 11:43 AM  Dictation workstation:   [id number]     The test results show that your current treatment is working. Please continue your current medication and plan. If you have any questions or concerns, please don't hesitate to call.         Sincerely,      NAKUL ECHEVERRIA, DO   "

## 2024-04-13 ENCOUNTER — LAB REQUISITION (OUTPATIENT)
Dept: LAB | Facility: HOSPITAL | Age: 69
End: 2024-04-13
Payer: MEDICARE

## 2024-04-13 DIAGNOSIS — R30.0 DYSURIA: ICD-10-CM

## 2024-04-13 PROCEDURE — 87086 URINE CULTURE/COLONY COUNT: CPT

## 2024-04-15 LAB — BACTERIA UR CULT: NORMAL

## 2024-04-25 ENCOUNTER — OFFICE VISIT (OUTPATIENT)
Dept: PRIMARY CARE | Facility: CLINIC | Age: 69
End: 2024-04-25
Payer: MEDICARE

## 2024-04-25 VITALS
OXYGEN SATURATION: 98 % | HEIGHT: 64 IN | TEMPERATURE: 98 F | SYSTOLIC BLOOD PRESSURE: 144 MMHG | DIASTOLIC BLOOD PRESSURE: 86 MMHG | HEART RATE: 75 BPM | BODY MASS INDEX: 21.28 KG/M2 | RESPIRATION RATE: 16 BRPM

## 2024-04-25 DIAGNOSIS — L03.114 CELLULITIS OF LEFT UPPER EXTREMITY: Primary | ICD-10-CM

## 2024-04-25 DIAGNOSIS — M00.9: ICD-10-CM

## 2024-04-25 PROCEDURE — 99213 OFFICE O/P EST LOW 20 MIN: CPT | Performed by: NURSE PRACTITIONER

## 2024-04-25 RX ORDER — CEPHALEXIN 500 MG/1
500 CAPSULE ORAL 3 TIMES DAILY
Qty: 30 CAPSULE | Refills: 0 | Status: SHIPPED | OUTPATIENT
Start: 2024-04-25 | End: 2024-05-05

## 2024-04-25 ASSESSMENT — ENCOUNTER SYMPTOMS
HEADACHES: 0
JOINT SWELLING: 1
FEVER: 0
WHEEZING: 0
CHILLS: 0
FATIGUE: 0
DIZZINESS: 0
COLOR CHANGE: 1
PALPITATIONS: 0
ABDOMINAL PAIN: 0
OCCASIONAL FEELINGS OF UNSTEADINESS: 0
VOMITING: 0
WEAKNESS: 0
DIARRHEA: 0
DEPRESSION: 0
WOUND: 0
CONSTIPATION: 0
SHORTNESS OF BREATH: 0
LOSS OF SENSATION IN FEET: 0
COUGH: 0

## 2024-04-25 ASSESSMENT — PATIENT HEALTH QUESTIONNAIRE - PHQ9
2. FEELING DOWN, DEPRESSED OR HOPELESS: NOT AT ALL
1. LITTLE INTEREST OR PLEASURE IN DOING THINGS: NOT AT ALL
SUM OF ALL RESPONSES TO PHQ9 QUESTIONS 1 AND 2: 0

## 2024-04-25 NOTE — PROGRESS NOTES
"Subjective   Patient ID: Tonie Polanco is a 68 y.o. female who presents for elbow pain    Symptoms: left elbow pain,  swelling and hot with redness, painful to touch,   Length of symptoms: 2 days ago    HPI   HPI 68 year old female presents today complaining of redness, swelling and warmth of her left elbow. Symptoms started 2 days ago. She denies any injury or trauma to the elbow. She has had episodes like this happen in the past in which she was treated with antibiotics. She states that it always occurs in her left elbow. She denies any fever/chills. No history of MRSA.     Review of Systems   Constitutional:  Negative for chills, fatigue and fever.   Respiratory:  Negative for cough, shortness of breath and wheezing.    Cardiovascular:  Negative for chest pain and palpitations.   Gastrointestinal:  Negative for abdominal pain, constipation, diarrhea and vomiting.   Musculoskeletal:  Positive for joint swelling.   Skin:  Positive for color change. Negative for rash and wound.   Neurological:  Negative for dizziness, weakness and headaches.       Objective   /86 Comment: auto  Pulse 75   Temp 36.7 °C (98 °F)   Resp 16   Ht 1.626 m (5' 4\")   SpO2 98%   BMI 21.28 kg/m²     Physical Exam  Vitals and nursing note reviewed.   Constitutional:       Appearance: Normal appearance. She is normal weight.   Cardiovascular:      Rate and Rhythm: Normal rate and regular rhythm.      Heart sounds: Normal heart sounds.   Pulmonary:      Effort: Pulmonary effort is normal.      Breath sounds: Normal breath sounds.   Musculoskeletal:         General: Swelling present.      Comments: Left Elbow ROM wnl. Left radial pulse 2+. Sensation intact, cap refill brisk.    Skin:     General: Skin is warm.      Findings: Erythema present. No rash.      Comments: Warmth, swelling and erythema noted over left elbow. No rash or openings in the skin noted.    Psychiatric:         Mood and Affect: Mood normal.         Behavior: " Behavior normal.       Assessment/Plan   Problem List Items Addressed This Visit    None  Visit Diagnoses         Codes    Cellulitis of left upper extremity    -  Primary L03.114    Relevant Medications    cephalexin (Keflex) 500 mg capsule    Infection of left elbow (Multi)     M00.9    Relevant Medications    cephalexin (Keflex) 500 mg capsule        Will treat with Keflex (patient unable to tolerate doxycycline). Follow up with PCP in 2-3 days for recheck with any persisting symptoms, or sooner with any additional concerns. If developing any worsening symptoms, pain, swelling, redness, red streaking coming from area or fever/chills, proceed to ER for further evaluation/treatment.

## 2024-05-29 ENCOUNTER — OFFICE VISIT (OUTPATIENT)
Dept: ORTHOPEDIC SURGERY | Facility: CLINIC | Age: 69
End: 2024-05-29
Payer: MEDICARE

## 2024-05-29 ENCOUNTER — HOSPITAL ENCOUNTER (OUTPATIENT)
Dept: RADIOLOGY | Facility: CLINIC | Age: 69
Discharge: HOME | End: 2024-05-29
Payer: MEDICARE

## 2024-05-29 DIAGNOSIS — S32.000S LUMBAR COMPRESSION FRACTURE, SEQUELA: Primary | ICD-10-CM

## 2024-05-29 DIAGNOSIS — M54.50 LOW BACK PAIN, UNSPECIFIED BACK PAIN LATERALITY, UNSPECIFIED CHRONICITY, UNSPECIFIED WHETHER SCIATICA PRESENT: ICD-10-CM

## 2024-05-29 PROCEDURE — 99214 OFFICE O/P EST MOD 30 MIN: CPT | Performed by: PHYSICIAN ASSISTANT

## 2024-05-29 PROCEDURE — 72120 X-RAY BEND ONLY L-S SPINE: CPT

## 2024-05-29 PROCEDURE — 1160F RVW MEDS BY RX/DR IN RCRD: CPT | Performed by: PHYSICIAN ASSISTANT

## 2024-05-29 PROCEDURE — 1159F MED LIST DOCD IN RCRD: CPT | Performed by: PHYSICIAN ASSISTANT

## 2024-05-29 PROCEDURE — 3008F BODY MASS INDEX DOCD: CPT | Performed by: PHYSICIAN ASSISTANT

## 2024-05-29 PROCEDURE — 72114 X-RAY EXAM L-S SPINE BENDING: CPT | Performed by: ORTHOPAEDIC SURGERY

## 2024-05-29 NOTE — PROGRESS NOTES
Established patient to the practice.  I seen before for some compression fractures.  She comes to the office complaining of increasing low back pain recently.  He has no trauma accident or fall to cause it.  No bowel or bladder complaints.  No real radiculopathy or paresthesia.  She denies saddle anesthesia gait or balance changes.  Denies any recent treatment.    We looked at patient's recent blood work.  Checked a metabolic panel glucose 82 sodium 144 potassium 4.1.  We looked at the vitamin D level which was 44.  We also looked at her last bone density study which was -2.9 which is osteoporotic read primary doctors note check the medication list.  To see if she is on a statin medication and she has.    Physical exam: Well-nourished, well kept.  No lymphangitis or lymphadenopathy in the examined extremities.  Good perfusion to the extremities ×4.  Radial and dorsalis pedis pulses 2+.  Capillary refill to all 4 digits brisk.  No distal edema x 4.  Gait normal.  Can walk on heels and toes.  Examination of the neck reveals no swelling, step-off, or point tenderness.  Range of motion with flexion, extension, side bending and rotation is well maintained without crepitance, instability, or exacerbation of pain.  Strength is within normal limits.  There is decreased range of motion flexion-extension rotation lumbar spine tender lumbar spinal musculature good strength no instability.  Examination of the upper extremities reveals no point tenderness, swelling, or deformity.  Range of motion of the shoulders, elbows, wrists, and fingers are all full without crepitance, instability, or exacerbation of pain.  Strength is 5/5 throughout.  Examination of the lower extremities reveals no point tenderness, swelling, or deformity.  Range of motion of the hips, knees, and ankles are full without crepitance, instability, or exacerbation of pain.  Strength is 5/5 throughout.  No redness, abrasions, or lesions on all 4 extremities,  head and neck, or trunk.  Gross sensation intact in the extremities ×4.  Deep tendon reflexes 2+ and symmetric bilaterally.  Alex, clonus, and Babinski were negative.  Straight leg raise negative.  Affect normal.  Alert and oriented ×3.  Coordination normal.    X-ray: X-ray lumbar spine taken today and reviewed shows a lot of arthritic degenerative changes.  There may be a slight decrease in vertebral size at the L3 vertebral body is hard to tell low on the x-ray.  Due to the weak imaging due to osteoporosis.    Assessment: This a patient with history of osteoporosis and compression fractures.  I am concerned for acute new compression fracture.  We talked about treatment options which would be continuing with bracing conservative care versus a kyphoplasty and the risk of adjacent level fractures if a kyphoplasty is done.    Plan: Will do a pain management referral in case she has an acute fracture and wants to have the procedure done also get an MRI of the lumbar spine at [facility] for diagnostic purposes and to follow-up after those films

## 2024-06-01 ENCOUNTER — HOSPITAL ENCOUNTER (OUTPATIENT)
Dept: RADIOLOGY | Facility: CLINIC | Age: 69
Discharge: HOME | End: 2024-06-01
Payer: MEDICARE

## 2024-06-01 DIAGNOSIS — S32.000S LUMBAR COMPRESSION FRACTURE, SEQUELA: ICD-10-CM

## 2024-06-01 PROCEDURE — 72148 MRI LUMBAR SPINE W/O DYE: CPT | Performed by: RADIOLOGY

## 2024-06-01 PROCEDURE — 72148 MRI LUMBAR SPINE W/O DYE: CPT

## 2024-06-04 ENCOUNTER — APPOINTMENT (OUTPATIENT)
Dept: ALLERGY | Facility: CLINIC | Age: 69
End: 2024-06-04
Payer: MEDICARE

## 2024-06-04 DIAGNOSIS — M00.9: ICD-10-CM

## 2024-06-04 NOTE — TELEPHONE ENCOUNTER
Dr. Batista Pt    Pt wants to let [facility] know that left elbow is flaring up and would like  to call in med for flare up. Pt said that LUIS ANTONIO has prescribed bactrim or doxycycline (Vibramycin) 100 mg capsule in the past for this flare up. Please advise, thank you.      [id number]  [place], [place] - [address] [facility]

## 2024-06-05 ENCOUNTER — OFFICE VISIT (OUTPATIENT)
Dept: ORTHOPEDIC SURGERY | Facility: CLINIC | Age: 69
End: 2024-06-05
Payer: MEDICARE

## 2024-06-05 DIAGNOSIS — M54.50 LOW BACK PAIN, UNSPECIFIED BACK PAIN LATERALITY, UNSPECIFIED CHRONICITY, UNSPECIFIED WHETHER SCIATICA PRESENT: Primary | ICD-10-CM

## 2024-06-05 PROCEDURE — 3008F BODY MASS INDEX DOCD: CPT | Performed by: PHYSICIAN ASSISTANT

## 2024-06-05 PROCEDURE — 99213 OFFICE O/P EST LOW 20 MIN: CPT | Performed by: PHYSICIAN ASSISTANT

## 2024-06-05 RX ORDER — SULFAMETHOXAZOLE AND TRIMETHOPRIM 800; 160 MG/1; MG/1
1 TABLET ORAL 2 TIMES DAILY
Qty: 20 TABLET | Refills: 0 | Status: CANCELLED | OUTPATIENT
Start: 2024-06-05

## 2024-06-05 RX ORDER — AMOXICILLIN AND CLAVULANATE POTASSIUM 875; 125 MG/1; MG/1
875 TABLET, FILM COATED ORAL 2 TIMES DAILY
Qty: 20 TABLET | Refills: 0 | Status: SHIPPED | OUTPATIENT
Start: 2024-06-05 | End: 2024-06-15

## 2024-06-05 NOTE — PROGRESS NOTES
Established patient to the practice here for follow-up MRI with her  we had concerns for compression fracture.  She was doing some pulling and tugging on her  who had a surgery.  And so she thinks her back.  We fitted her for a brace again she had a fracture and the brace was actually making things worse and she had a different lumbar support back brace.  She feels better.  She denies bowel or megalies anesthesia gait or balance changes.  She also has complaints some pain into her right hand.  Distal metacarpal on her right hand.  And also pain and swelling to her left elbow    Physical exam: Well-nourished, well kept.  No lymphangitis or lymphadenopathy in the examined extremities.  Good perfusion to the lower extremities bilaterally.  Dorsalis pedis pulses 2+.  Capillary refill to the digits brisk.  No distal edema.  Gait normal.  Can walk on heels and toes.  Examination of the back reveals no swelling, step-off, or point tenderness.  Range of motion with flexion, extension, side bending and rotation is well maintained without crepitance, instability, or exacerbation of pain.  Strength is within normal limits.  Examination of the lower extremities reveals no point tenderness, swelling, or deformity.  Range of motion of the hips, knees, and ankles are full without crepitance, instability, or exacerbation of pain.  Strength is 5/5 throughout.  No redness, abrasions, or lesions on the lower extremities bilaterally.  Gross sensation intact to the lower extremity is bilaterally.  Affect normal.    MRI: MRI was reviewed report reviewed as well showing chronic compression deformity of the L4 vertebral body degenerative disc disease multiple levels.  No new acute compression fractures.  She does have central stenosis and foraminal narrowing due to some disc bulges.    Assessment: This a patient with had lumbar sprain strain bilaterally.  Essentially feeling a little bit better.  She also has pain and  swelling to the right distal metacarpal on the right hand and some pain into her left elbow which is still swell and become warm on occasion.    Plan: She will see us on a as needed status.  I will get her to see our hand specialist for her right hand and her left elbow

## 2024-06-13 ENCOUNTER — CLINICAL SUPPORT (OUTPATIENT)
Dept: PRIMARY CARE | Facility: CLINIC | Age: 69
End: 2024-06-13
Payer: MEDICARE

## 2024-06-13 DIAGNOSIS — J30.1 SEASONAL ALLERGIC RHINITIS DUE TO POLLEN: ICD-10-CM

## 2024-06-13 RX ORDER — TRIAMCINOLONE ACETONIDE 40 MG/ML
80 INJECTION, SUSPENSION INTRA-ARTICULAR; INTRAMUSCULAR ONCE
Status: SHIPPED | OUTPATIENT
Start: 2024-06-13

## 2024-06-13 NOTE — PROGRESS NOTES
LUIS ANTONIO patient     Patient presents in office for kenalog injection. Patient tolerated injection well without any complications.

## 2024-06-17 ENCOUNTER — OFFICE VISIT (OUTPATIENT)
Dept: ORTHOPEDIC SURGERY | Facility: CLINIC | Age: 69
End: 2024-06-17
Payer: MEDICARE

## 2024-06-17 ENCOUNTER — HOSPITAL ENCOUNTER (OUTPATIENT)
Dept: RADIOLOGY | Facility: CLINIC | Age: 69
Discharge: HOME | End: 2024-06-17
Payer: MEDICARE

## 2024-06-17 DIAGNOSIS — M25.522 LEFT ELBOW PAIN: ICD-10-CM

## 2024-06-17 DIAGNOSIS — M79.641 RIGHT HAND PAIN: ICD-10-CM

## 2024-06-17 PROCEDURE — 99214 OFFICE O/P EST MOD 30 MIN: CPT | Performed by: STUDENT IN AN ORGANIZED HEALTH CARE EDUCATION/TRAINING PROGRAM

## 2024-06-17 PROCEDURE — 2500000004 HC RX 250 GENERAL PHARMACY W/ HCPCS (ALT 636 FOR OP/ED): Performed by: STUDENT IN AN ORGANIZED HEALTH CARE EDUCATION/TRAINING PROGRAM

## 2024-06-17 PROCEDURE — 20600 DRAIN/INJ JOINT/BURSA W/O US: CPT | Mod: RT | Performed by: STUDENT IN AN ORGANIZED HEALTH CARE EDUCATION/TRAINING PROGRAM

## 2024-06-17 PROCEDURE — 99214 OFFICE O/P EST MOD 30 MIN: CPT | Mod: 25 | Performed by: STUDENT IN AN ORGANIZED HEALTH CARE EDUCATION/TRAINING PROGRAM

## 2024-06-17 PROCEDURE — 73080 X-RAY EXAM OF ELBOW: CPT | Mod: LT

## 2024-06-17 PROCEDURE — 73130 X-RAY EXAM OF HAND: CPT | Mod: RT

## 2024-06-17 PROCEDURE — 73130 X-RAY EXAM OF HAND: CPT | Mod: RIGHT SIDE | Performed by: STUDENT IN AN ORGANIZED HEALTH CARE EDUCATION/TRAINING PROGRAM

## 2024-06-17 PROCEDURE — 3008F BODY MASS INDEX DOCD: CPT | Performed by: STUDENT IN AN ORGANIZED HEALTH CARE EDUCATION/TRAINING PROGRAM

## 2024-06-17 PROCEDURE — 73080 X-RAY EXAM OF ELBOW: CPT | Mod: LEFT SIDE | Performed by: STUDENT IN AN ORGANIZED HEALTH CARE EDUCATION/TRAINING PROGRAM

## 2024-06-17 PROCEDURE — 2500000005 HC RX 250 GENERAL PHARMACY W/O HCPCS: Performed by: STUDENT IN AN ORGANIZED HEALTH CARE EDUCATION/TRAINING PROGRAM

## 2024-06-17 RX ORDER — LIDOCAINE HYDROCHLORIDE 10 MG/ML
0.5 INJECTION INFILTRATION; PERINEURAL
Status: COMPLETED | OUTPATIENT
Start: 2024-06-17 | End: 2024-06-17

## 2024-06-17 NOTE — PROGRESS NOTES
History of Present Illness:  Presents for evaluation of right second MCP.  Patient denies inciting trauma.  The pain is localized to the right second MCP.  It is described as moderate. The pain occurs intermittantly. The patient presents due to persistent symptoms even with activity modification.      Additionally she has history of olecranon bursitis on the left a few weeks ago that has subsequently resolved.  States she did not take antibiotics for this specifically placed with complete resolution.    Review of Systems   GENERAL: Negative for malaise, significant weight loss, fever  MUSCULOSKELETAL: see HPI  NEURO:  Negative    The patient's past medical history, family history, social history, and review of systems were reviewed. History is otherwise negative except as stated in the HPI.    Physical Examination:  General: Alert and oriented to person, place, and time.  No acute distress and breathing comfortably: Pleasant and cooperative with examination.  HEENT: Head is normocephalic and atraumatic.  Neck: Supple, no visible swelling.  Cardiovascular: No palpable tachycardia  Lungs: No audible wheezing or labored breathing  Abdomen: Nondistended.  On musculoskeletal examination on the right, the patient has full elbow range of motion. In regards to the wrist, there is no obvious deformity. Range of motion is full in flexion, extension, pronation, and supination. Strength is 5/5 in flexion and extension. There is tenderness to palpation of the right second MCP.  There is no tenderness to palpation about the thumb cmc, 1st dorsal compartment, the SL interval, or the TFCC. Sensation and motor function are intact in the radial, ulnar, and median nerve distribution. There is no obvious thenar or intrinsic atrophy. All fingers are without triggering and are without pain over the A1 pulley. The patient can make a full composite fist. The hand itself is warm and well perfused. The skin is intact throughout. The  contralateral hand and wrist are normal to inspection, range of motion, stability, and strength.    Imaging:  AP, lateral, and oblique radiographs of the right hand were reviewed. 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    Hand / UE Inj/Asp: R index MCP for osteoarthritis on 6/17/2024 1:02 PM  Indications: pain  Details: 24 G needle, volar approach  Medications: 5 mg triamcinolone acetonide 10 mg/mL; 0.5 mL lidocaine 10 mg/mL (1 %)  Procedure, treatment alternatives, risks and benefits explained, specific risks discussed. Consent was given by the patient. Immediately prior to procedure a time out was called to verify the correct patient, procedure, equipment, support staff and site/side marked as required.               Assessment:  Patient with severe second and third MCP arthritis.  Symptomatic to the second MCP today.    Additionally discussed results olecranon bursitis at length today.  Discussed how to treat if recurrence with avoiding pressure on the elbow compression and icing.  If redness erythema or warmth need to present for further evaluation.    Plan:  Right 2nd MCP Injection. I had a long discussion with the patient regarding the diagnosis of hand/wrist arthritis and the risks/benefits/expected outcomes of various treatment options. At this point, the patient elected non-operative treatment consisting of activity modification, intermittant NSAIDs (if not medically contraindicated), and/or  splinting. I also discussed the option of a corticosteroid injection. Specifically, I reviewed the risks of injection which include, but are not limited to, infection, bleeding, pain, steroid flare, glycemic alteration, subcutaneous fat atrophy, skin hypopigmentation, soft tissue damage, and incomplete symptom relief. The patient consented to the injection, and then using sterile technique, I injected a 1mL of Kenalog 40 into the 2nd MCP. The injection site was dressed, and the patient tolerated the  injection well. Finally, I have emphasized patience, as any benefit may take some time to manifest. Depending on the success of this non-operative course, I will see them back on an as needed basis.    Brooklyn Posey MD  Orthopaedic Surgeon

## 2024-07-15 ENCOUNTER — APPOINTMENT (OUTPATIENT)
Dept: INFUSION THERAPY | Facility: CLINIC | Age: 69
End: 2024-07-15
Payer: MEDICARE

## 2024-08-02 ENCOUNTER — TELEPHONE (OUTPATIENT)
Dept: PRIMARY CARE | Facility: CLINIC | Age: 69
End: 2024-08-02

## 2024-08-02 ENCOUNTER — OFFICE VISIT (OUTPATIENT)
Dept: PRIMARY CARE | Facility: CLINIC | Age: 69
End: 2024-08-02
Payer: MEDICARE

## 2024-08-02 VITALS
HEART RATE: 69 BPM | BODY MASS INDEX: 20.83 KG/M2 | WEIGHT: 122 LBS | SYSTOLIC BLOOD PRESSURE: 120 MMHG | DIASTOLIC BLOOD PRESSURE: 80 MMHG | OXYGEN SATURATION: 98 % | RESPIRATION RATE: 16 BRPM | HEIGHT: 64 IN | TEMPERATURE: 97.9 F

## 2024-08-02 DIAGNOSIS — E78.5 DYSLIPIDEMIA: ICD-10-CM

## 2024-08-02 DIAGNOSIS — J32.9 SINUSITIS, UNSPECIFIED CHRONICITY, UNSPECIFIED LOCATION: Primary | ICD-10-CM

## 2024-08-02 DIAGNOSIS — R53.83 FATIGUE, UNSPECIFIED TYPE: ICD-10-CM

## 2024-08-02 DIAGNOSIS — E55.9 VITAMIN D DEFICIENCY: ICD-10-CM

## 2024-08-02 DIAGNOSIS — J34.89 SINUS PAIN: ICD-10-CM

## 2024-08-02 PROCEDURE — 99213 OFFICE O/P EST LOW 20 MIN: CPT | Performed by: NURSE PRACTITIONER

## 2024-08-02 RX ORDER — CEFDINIR 300 MG/1
300 CAPSULE ORAL 2 TIMES DAILY
Qty: 20 CAPSULE | Refills: 0 | Status: SHIPPED | OUTPATIENT
Start: 2024-08-02 | End: 2024-08-12

## 2024-08-02 ASSESSMENT — ENCOUNTER SYMPTOMS
HEADACHES: 1
CONSTIPATION: 0
SINUS PRESSURE: 1
SORE THROAT: 0
COUGH: 0
NAUSEA: 0
DIZZINESS: 0
SINUS PAIN: 1
SHORTNESS OF BREATH: 0
PALPITATIONS: 0
VOMITING: 0
FEVER: 0
ABDOMINAL PAIN: 0
WEAKNESS: 0
DIARRHEA: 0
WHEEZING: 0
CHILLS: 0

## 2024-08-02 NOTE — PROGRESS NOTES
"Subjective   Patient ID: Tonie Polanco is a 69 y.o. female who presents for Sinusitis and Earache.    Sinusitis  Episode onset: 3 days. Associated symptoms include congestion, ear pain, headaches and sinus pressure. Pertinent negatives include no chills, coughing, shortness of breath or sore throat. Past treatments include nasal decongestants.   Earache   There is pain in the left ear. Episode onset: 3 days. The pain is at a severity of 6/10. Associated symptoms include headaches. Pertinent negatives include no abdominal pain, coughing, diarrhea, rash, sore throat or vomiting.     69-year old female presents today complaining of worsening sinus pain/pressure and right ear pain over the last 3 days.  She is feeling congested.  She states that her teeth and her head ache as well.  She denies any fever or chills.  No cough or sore throat.  She denies any ill contacts.  She has been taking Flonase and trying a nasal rinse with little relief.    Review of Systems   Constitutional:  Negative for chills and fever.   HENT:  Positive for congestion, ear pain, sinus pressure and sinus pain. Negative for sore throat.    Respiratory:  Negative for cough, shortness of breath and wheezing.    Cardiovascular:  Negative for chest pain and palpitations.   Gastrointestinal:  Negative for abdominal pain, constipation, diarrhea, nausea and vomiting.   Skin:  Negative for rash.   Neurological:  Positive for headaches. Negative for dizziness and weakness.       Objective   /80 (BP Location: Left arm, Patient Position: Sitting, BP Cuff Size: Adult)   Pulse 69   Temp 36.6 °C (97.9 °F) (Temporal)   Resp 16   Ht 1.626 m (5' 4\")   Wt 55.3 kg (122 lb)   SpO2 98%   BMI 20.94 kg/m²     Physical Exam  Vitals and nursing note reviewed.   Constitutional:       General: She is not in acute distress.     Appearance: Normal appearance.   HENT:      Right Ear: Tympanic membrane normal.      Left Ear: Tympanic membrane normal.      Nose: " Congestion present.      Right Sinus: Maxillary sinus tenderness and frontal sinus tenderness present.      Left Sinus: Maxillary sinus tenderness and frontal sinus tenderness present.      Mouth/Throat:      Pharynx: No oropharyngeal exudate or posterior oropharyngeal erythema.   Eyes:      Conjunctiva/sclera: Conjunctivae normal.   Cardiovascular:      Rate and Rhythm: Normal rate and regular rhythm.   Pulmonary:      Effort: Pulmonary effort is normal.      Breath sounds: Normal breath sounds. No wheezing, rhonchi or rales.   Neurological:      Mental Status: She is alert.   Psychiatric:         Mood and Affect: Mood normal.         Behavior: Behavior normal.       Assessment/Plan   Problem List Items Addressed This Visit    None  Visit Diagnoses         Codes    Sinusitis, unspecified chronicity, unspecified location    -  Primary J32.9    Relevant Medications    cefTRIAXone (Rocephin) 1 g in sterile water 2.86 mL injection (Start on 8/2/2024 11:15 AM)    cefdinir (Omnicef) 300 mg capsule    Sinus pain     J34.89    BMI 20.0-20.9, adult     Z68.20        Sinusitis: Will treat with 1 g of Rocephin in office today and cefdinir.  Follow-up in 1 week with any persisting symptoms, or sooner with any additional concerns.

## 2024-08-17 ENCOUNTER — LAB (OUTPATIENT)
Dept: LAB | Facility: LAB | Age: 69
End: 2024-08-17
Payer: MEDICARE

## 2024-08-17 DIAGNOSIS — R53.83 FATIGUE, UNSPECIFIED TYPE: ICD-10-CM

## 2024-08-17 DIAGNOSIS — E55.9 VITAMIN D DEFICIENCY: ICD-10-CM

## 2024-08-17 DIAGNOSIS — E78.5 DYSLIPIDEMIA: ICD-10-CM

## 2024-08-17 LAB
25(OH)D3 SERPL-MCNC: 35 NG/ML (ref 30–100)
ALBUMIN SERPL BCP-MCNC: 3.7 G/DL (ref 3.4–5)
ALP SERPL-CCNC: 79 U/L (ref 33–136)
ALT SERPL W P-5'-P-CCNC: 20 U/L (ref 7–45)
ANION GAP SERPL CALC-SCNC: 11 MMOL/L (ref 10–20)
AST SERPL W P-5'-P-CCNC: 27 U/L (ref 9–39)
BASOPHILS # BLD AUTO: 0.03 X10*3/UL (ref 0–0.1)
BASOPHILS NFR BLD AUTO: 0.6 %
BILIRUB SERPL-MCNC: 0.7 MG/DL (ref 0–1.2)
BUN SERPL-MCNC: 14 MG/DL (ref 6–23)
CALCIUM SERPL-MCNC: 9.2 MG/DL (ref 8.6–10.3)
CHLORIDE SERPL-SCNC: 109 MMOL/L (ref 98–107)
CHOLEST SERPL-MCNC: 204 MG/DL (ref 0–199)
CHOLESTEROL/HDL RATIO: 3.2
CO2 SERPL-SCNC: 29 MMOL/L (ref 21–32)
CREAT SERPL-MCNC: 0.81 MG/DL (ref 0.5–1.05)
EGFRCR SERPLBLD CKD-EPI 2021: 79 ML/MIN/1.73M*2
EOSINOPHIL # BLD AUTO: 0.19 X10*3/UL (ref 0–0.7)
EOSINOPHIL NFR BLD AUTO: 3.8 %
ERYTHROCYTE [DISTWIDTH] IN BLOOD BY AUTOMATED COUNT: 13.7 % (ref 11.5–14.5)
GLUCOSE SERPL-MCNC: 77 MG/DL (ref 74–99)
HCT VFR BLD AUTO: 44.4 % (ref 36–46)
HDLC SERPL-MCNC: 64.3 MG/DL
HGB BLD-MCNC: 14.5 G/DL (ref 12–16)
IMM GRANULOCYTES # BLD AUTO: 0.01 X10*3/UL (ref 0–0.7)
IMM GRANULOCYTES NFR BLD AUTO: 0.2 % (ref 0–0.9)
LDLC SERPL CALC-MCNC: 117 MG/DL
LYMPHOCYTES # BLD AUTO: 1.99 X10*3/UL (ref 1.2–4.8)
LYMPHOCYTES NFR BLD AUTO: 40.3 %
MCH RBC QN AUTO: 31.7 PG (ref 26–34)
MCHC RBC AUTO-ENTMCNC: 32.7 G/DL (ref 32–36)
MCV RBC AUTO: 97 FL (ref 80–100)
MONOCYTES # BLD AUTO: 0.47 X10*3/UL (ref 0.1–1)
MONOCYTES NFR BLD AUTO: 9.5 %
NEUTROPHILS # BLD AUTO: 2.25 X10*3/UL (ref 1.2–7.7)
NEUTROPHILS NFR BLD AUTO: 45.6 %
NON HDL CHOLESTEROL: 140 MG/DL (ref 0–149)
NRBC BLD-RTO: 0 /100 WBCS (ref 0–0)
PLATELET # BLD AUTO: 251 X10*3/UL (ref 150–450)
POTASSIUM SERPL-SCNC: 4.9 MMOL/L (ref 3.5–5.3)
PROT SERPL-MCNC: 6.2 G/DL (ref 6.4–8.2)
RBC # BLD AUTO: 4.57 X10*6/UL (ref 4–5.2)
SODIUM SERPL-SCNC: 144 MMOL/L (ref 136–145)
TRIGL SERPL-MCNC: 116 MG/DL (ref 0–149)
TSH SERPL-ACNC: 1.91 MIU/L (ref 0.44–3.98)
VLDL: 23 MG/DL (ref 0–40)
WBC # BLD AUTO: 4.9 X10*3/UL (ref 4.4–11.3)

## 2024-08-17 PROCEDURE — 82306 VITAMIN D 25 HYDROXY: CPT

## 2024-08-17 PROCEDURE — 80061 LIPID PANEL: CPT

## 2024-08-17 PROCEDURE — 36415 COLL VENOUS BLD VENIPUNCTURE: CPT

## 2024-08-17 PROCEDURE — 80053 COMPREHEN METABOLIC PANEL: CPT

## 2024-08-17 PROCEDURE — 84443 ASSAY THYROID STIM HORMONE: CPT

## 2024-08-17 PROCEDURE — 85025 COMPLETE CBC W/AUTO DIFF WBC: CPT

## 2024-08-27 ENCOUNTER — APPOINTMENT (OUTPATIENT)
Dept: ORTHOPEDIC SURGERY | Facility: CLINIC | Age: 69
End: 2024-08-27
Payer: MEDICARE

## 2024-09-01 DIAGNOSIS — E78.5 HYPERLIPIDEMIA, UNSPECIFIED: ICD-10-CM

## 2024-09-01 DIAGNOSIS — J01.01 ACUTE RECURRENT MAXILLARY SINUSITIS: ICD-10-CM

## 2024-09-03 RX ORDER — ATENOLOL 25 MG/1
25 TABLET ORAL DAILY
Qty: 90 TABLET | Refills: 1 | Status: SHIPPED | OUTPATIENT
Start: 2024-09-03 | End: 2025-03-02

## 2024-09-03 RX ORDER — SIMVASTATIN 10 MG/1
TABLET, FILM COATED ORAL
Qty: 90 TABLET | Refills: 1 | Status: SHIPPED | OUTPATIENT
Start: 2024-09-03

## 2024-09-13 ENCOUNTER — HOSPITAL ENCOUNTER (OUTPATIENT)
Dept: RADIOLOGY | Facility: CLINIC | Age: 69
Discharge: HOME | End: 2024-09-13
Payer: MEDICARE

## 2024-09-13 ENCOUNTER — OFFICE VISIT (OUTPATIENT)
Dept: ORTHOPEDIC SURGERY | Facility: CLINIC | Age: 69
End: 2024-09-13
Payer: MEDICARE

## 2024-09-13 DIAGNOSIS — M25.552 PAIN OF LEFT HIP: ICD-10-CM

## 2024-09-13 DIAGNOSIS — M54.50 LOW BACK PAIN, UNSPECIFIED BACK PAIN LATERALITY, UNSPECIFIED CHRONICITY, UNSPECIFIED WHETHER SCIATICA PRESENT: ICD-10-CM

## 2024-09-13 DIAGNOSIS — M70.62 TROCHANTERIC BURSITIS OF LEFT HIP: Primary | ICD-10-CM

## 2024-09-13 PROCEDURE — 99214 OFFICE O/P EST MOD 30 MIN: CPT | Performed by: PHYSICIAN ASSISTANT

## 2024-09-13 PROCEDURE — 20610 DRAIN/INJ JOINT/BURSA W/O US: CPT | Mod: LT | Performed by: PHYSICIAN ASSISTANT

## 2024-09-13 PROCEDURE — 73502 X-RAY EXAM HIP UNI 2-3 VIEWS: CPT | Mod: LT

## 2024-09-13 PROCEDURE — 2500000004 HC RX 250 GENERAL PHARMACY W/ HCPCS (ALT 636 FOR OP/ED): Performed by: PHYSICIAN ASSISTANT

## 2024-09-13 PROCEDURE — 99213 OFFICE O/P EST LOW 20 MIN: CPT | Performed by: PHYSICIAN ASSISTANT

## 2024-09-13 PROCEDURE — 1159F MED LIST DOCD IN RCRD: CPT | Performed by: PHYSICIAN ASSISTANT

## 2024-09-13 PROCEDURE — 2500000005 HC RX 250 GENERAL PHARMACY W/O HCPCS: Performed by: PHYSICIAN ASSISTANT

## 2024-09-13 RX ADMIN — LIDOCAINE HYDROCHLORIDE 4 ML: 10 INJECTION, SOLUTION INFILTRATION; PERINEURAL at 15:40

## 2024-09-13 RX ADMIN — BETAMETHASONE SODIUM PHOSPHATE AND BETAMETHASONE ACETATE 2 ML: 3; 3 INJECTION, SUSPENSION INTRA-ARTICULAR; INTRALESIONAL; INTRAMUSCULAR at 15:40

## 2024-09-13 NOTE — PROGRESS NOTES
Subjective    Patient ID: Tonie    Chief Complaint:   Chief Complaint   Patient presents with    Left Hip - Follow-up, Pain     [facility]  DOS: 11/21/22     History of present illness    69-year-old female presented clinic today for evaluation left hip pain.  She has had previous history of left total hip revision as well as previous hip bursitis.  She has been dealing with some intermittent low back pain with mild radicular symptoms.  Over the last few weeks he has had increased pain over the left hip with numbness and tingling down left lower extremity.  She has difficulty walking at times.  She has numbness and tingling going down the left foot at times.  Difficulty with sitting to standing.  Difficulty sleeping on the left side.      Past medical , Surgical, Family and social history reviewed.      Physical exam  General: No acute distress and breathing comfortably.  Patient is pleasant and cooperative with the examination.    Extremity  Left hip is neurovascular intact.  Good range of motion today.  Tenderness palpation of the left hip bursa region.  Incision is well-healed approximated without abnormality or infection.  No dehiscence.  No erythema ecchymosis.  No calf swelling or test.  Compartments are soft.  No groin pain with rotation.  Negative straight leg raise test.  Mild paresthesia of the left lower extremity.    Diagnostics  Please see dictated x-ray report  No results found.     Procedure  Please see procedure note for left hip trochanteric bursa injection    Assessment  Left hip trochanteric bursitis  Lumbar radiculopathy left lower extremity    Treatment plan  1.  She was encouraged to continue weightbearing activity as tolerated  2.  Postinjection instructions discussed with patient today.  Left hip bursa injection performed today without complication.  3.  We will go ahead and have her follow-up with Dr. Beltran for further evaluation of her lumbar radiculopathy.  Follow-up with us as  needed.  4.  All of the patient's questions were answered.    Orders Placed This Encounter    L Inj/Asp: L greater trochanteric bursa    XR hip left with pelvis when performed 2 or 3 views       This note was prepared using voice recognition software.  The details of this note are correct and have been reviewed, and corrected to the best of my ability.  Some grammatical areas may persist related to the Dragon software    Cas Medina PA-C, [facility]  Orthopedic [place]    [phone]      L Inj/Asp: L greater trochanteric bursa on 9/13/2024 3:40 PM  Indications: pain and diagnostic evaluation  Details: 25 G needle, lateral approach  Medications: 4 mL lidocaine 10 mg/mL (1 %); 2 mL betamethasone acet,sod phos 6 mg/mL  Outcome: tolerated well, no immediate complications  Procedure, treatment alternatives, risks and benefits explained, specific risks discussed. Consent was given by the patient. Immediately prior to procedure a time out was called to verify the correct patient, procedure, equipment, support staff and site/side marked as required. Patient was prepped and draped in the usual sterile fashion.

## 2024-09-15 RX ORDER — BETAMETHASONE SODIUM PHOSPHATE AND BETAMETHASONE ACETATE 3; 3 MG/ML; MG/ML
2 INJECTION, SUSPENSION INTRA-ARTICULAR; INTRALESIONAL; INTRAMUSCULAR; SOFT TISSUE
Status: COMPLETED | OUTPATIENT
Start: 2024-09-13 | End: 2024-09-13

## 2024-09-15 RX ORDER — LIDOCAINE HYDROCHLORIDE 10 MG/ML
4 INJECTION, SOLUTION INFILTRATION; PERINEURAL
Status: COMPLETED | OUTPATIENT
Start: 2024-09-13 | End: 2024-09-13

## 2024-09-16 ENCOUNTER — APPOINTMENT (OUTPATIENT)
Dept: ORTHOPEDIC SURGERY | Facility: CLINIC | Age: 69
End: 2024-09-16
Payer: MEDICARE

## 2024-09-19 ENCOUNTER — OFFICE VISIT (OUTPATIENT)
Dept: ORTHOPEDIC SURGERY | Facility: CLINIC | Age: 69
End: 2024-09-19
Payer: MEDICARE

## 2024-09-19 DIAGNOSIS — M47.26 OSTEOARTHRITIS OF SPINE WITH RADICULOPATHY, LUMBAR REGION: ICD-10-CM

## 2024-09-19 PROCEDURE — 99213 OFFICE O/P EST LOW 20 MIN: CPT | Performed by: PHYSICIAN ASSISTANT

## 2024-09-19 NOTE — PROGRESS NOTES
Established patient today seen she had a compression fracture which turned out to be chronic but she comes the office today with increase in low back pain.  Is going down the left leg numbness and tingling.  To the left foot and also she says she is getting numbness and tingling to the right foot she says it is affecting her daily functioning would like something done for this.  She denies bowel or bladder complaints saddle anesthesia denies any recent trauma accidents or falls to cause it denies any spine surgeries.  She had a bone density study back in 2022 which showed in the right femur and neck was -2.9 but in the spine she was -1.5 patient denies being a diabetic.    Physical exam: Well-nourished, well kept.  No lymphangitis or lymphadenopathy in the examined extremities.  Good perfusion to the lower extremities bilaterally.  Dorsalis pedis pulses 2+.  Capillary refill to the digits brisk.  No distal edema.  Gait normal.  Can walk on heels and toes.  Decreased range of motion flexion extension rotation lumbar spine tender good strength no instability.  Examination of the lower extremities reveals no point tenderness, swelling, or deformity.  Range of motion of the hips, knees, and ankles are full without crepitance, instability, or exacerbation of pain.  Strength is 5/5 throughout.  No redness, abrasions, or lesions on the lower extremities bilaterally.  Gross sensation intact to the lower extremity is bilaterally.  Affect normal.    We looked at patient's x-rays that were taken prior showing a scoliosis where she collapses to the left at the L2-3 level also a large laminar window at the L5-S1 level noted we also looked at the MRI and report was reviewed as well showing an old healed compression fracture at L4 degenerative disc disease L4-5 and L2-3 the most severe.  There are some narrowing of the left foramen at the L2-3 level.  And some mild to moderate central stenosis at L3-4.    Assessment: The patient  persistent back pain left leg pain numbness and tingling to both her feet and I do not see a whole lot on the on the MRI for a lot some of her symptoms she is having.  She would like to have this fixed surgically.    Plan: I am in to get an EMG nerve conduction study.  I will have the patient see Dr. Beltran to review this and the MRI to see what he recommends from a surgical standpoint.

## 2024-09-22 ENCOUNTER — HOSPITAL ENCOUNTER (EMERGENCY)
Facility: HOSPITAL | Age: 69
Discharge: HOME | End: 2024-09-22
Attending: EMERGENCY MEDICINE
Payer: MEDICARE

## 2024-09-22 ENCOUNTER — APPOINTMENT (OUTPATIENT)
Dept: CARDIOLOGY | Facility: HOSPITAL | Age: 69
End: 2024-09-22
Payer: MEDICARE

## 2024-09-22 ENCOUNTER — APPOINTMENT (OUTPATIENT)
Dept: RADIOLOGY | Facility: HOSPITAL | Age: 69
End: 2024-09-22
Payer: MEDICARE

## 2024-09-22 VITALS
TEMPERATURE: 99.3 F | SYSTOLIC BLOOD PRESSURE: 148 MMHG | DIASTOLIC BLOOD PRESSURE: 71 MMHG | WEIGHT: 120 LBS | HEART RATE: 87 BPM | RESPIRATION RATE: 18 BRPM | BODY MASS INDEX: 20.49 KG/M2 | HEIGHT: 64 IN | OXYGEN SATURATION: 99 %

## 2024-09-22 DIAGNOSIS — R07.81 PLEURITIC PAIN: Primary | ICD-10-CM

## 2024-09-22 LAB
ALBUMIN SERPL BCP-MCNC: 4 G/DL (ref 3.4–5)
ALP SERPL-CCNC: 86 U/L (ref 33–136)
ALT SERPL W P-5'-P-CCNC: 22 U/L (ref 7–45)
ANION GAP SERPL CALC-SCNC: 12 MMOL/L (ref 10–20)
AST SERPL W P-5'-P-CCNC: 25 U/L (ref 9–39)
BASOPHILS # BLD AUTO: 0.04 X10*3/UL (ref 0–0.1)
BASOPHILS NFR BLD AUTO: 0.5 %
BILIRUB SERPL-MCNC: 0.6 MG/DL (ref 0–1.2)
BNP SERPL-MCNC: 39 PG/ML (ref 0–99)
BUN SERPL-MCNC: 12 MG/DL (ref 6–23)
CALCIUM SERPL-MCNC: 9.2 MG/DL (ref 8.6–10.3)
CARDIAC TROPONIN I PNL SERPL HS: 3 NG/L (ref 0–13)
CARDIAC TROPONIN I PNL SERPL HS: <3 NG/L (ref 0–13)
CHLORIDE SERPL-SCNC: 103 MMOL/L (ref 98–107)
CO2 SERPL-SCNC: 28 MMOL/L (ref 21–32)
CREAT SERPL-MCNC: 0.64 MG/DL (ref 0.5–1.05)
EGFRCR SERPLBLD CKD-EPI 2021: >90 ML/MIN/1.73M*2
EOSINOPHIL # BLD AUTO: 0.28 X10*3/UL (ref 0–0.7)
EOSINOPHIL NFR BLD AUTO: 3.7 %
ERYTHROCYTE [DISTWIDTH] IN BLOOD BY AUTOMATED COUNT: 13.2 % (ref 11.5–14.5)
GLUCOSE SERPL-MCNC: 101 MG/DL (ref 74–99)
HCT VFR BLD AUTO: 43 % (ref 36–46)
HGB BLD-MCNC: 14.4 G/DL (ref 12–16)
IMM GRANULOCYTES # BLD AUTO: 0.01 X10*3/UL (ref 0–0.7)
IMM GRANULOCYTES NFR BLD AUTO: 0.1 % (ref 0–0.9)
INR PPP: 1 (ref 0.9–1.1)
LACTATE SERPL-SCNC: 1.1 MMOL/L (ref 0.4–2)
LYMPHOCYTES # BLD AUTO: 1.88 X10*3/UL (ref 1.2–4.8)
LYMPHOCYTES NFR BLD AUTO: 24.7 %
MAGNESIUM SERPL-MCNC: 1.64 MG/DL (ref 1.6–2.4)
MCH RBC QN AUTO: 32.4 PG (ref 26–34)
MCHC RBC AUTO-ENTMCNC: 33.5 G/DL (ref 32–36)
MCV RBC AUTO: 97 FL (ref 80–100)
MONOCYTES # BLD AUTO: 0.84 X10*3/UL (ref 0.1–1)
MONOCYTES NFR BLD AUTO: 11 %
NEUTROPHILS # BLD AUTO: 4.57 X10*3/UL (ref 1.2–7.7)
NEUTROPHILS NFR BLD AUTO: 60 %
NRBC BLD-RTO: 0 /100 WBCS (ref 0–0)
PLATELET # BLD AUTO: 243 X10*3/UL (ref 150–450)
POTASSIUM SERPL-SCNC: 3.7 MMOL/L (ref 3.5–5.3)
PROT SERPL-MCNC: 7.1 G/DL (ref 6.4–8.2)
PROTHROMBIN TIME: 11.2 SECONDS (ref 9.8–12.8)
RBC # BLD AUTO: 4.45 X10*6/UL (ref 4–5.2)
SODIUM SERPL-SCNC: 139 MMOL/L (ref 136–145)
WBC # BLD AUTO: 7.6 X10*3/UL (ref 4.4–11.3)

## 2024-09-22 PROCEDURE — 71045 X-RAY EXAM CHEST 1 VIEW: CPT

## 2024-09-22 PROCEDURE — 83735 ASSAY OF MAGNESIUM: CPT | Performed by: EMERGENCY MEDICINE

## 2024-09-22 PROCEDURE — 96361 HYDRATE IV INFUSION ADD-ON: CPT

## 2024-09-22 PROCEDURE — 99285 EMERGENCY DEPT VISIT HI MDM: CPT | Mod: 25

## 2024-09-22 PROCEDURE — 36415 COLL VENOUS BLD VENIPUNCTURE: CPT | Performed by: EMERGENCY MEDICINE

## 2024-09-22 PROCEDURE — 96375 TX/PRO/DX INJ NEW DRUG ADDON: CPT

## 2024-09-22 PROCEDURE — 96374 THER/PROPH/DIAG INJ IV PUSH: CPT | Mod: 59

## 2024-09-22 PROCEDURE — 71275 CT ANGIOGRAPHY CHEST: CPT

## 2024-09-22 PROCEDURE — 80053 COMPREHEN METABOLIC PANEL: CPT | Performed by: EMERGENCY MEDICINE

## 2024-09-22 PROCEDURE — 93005 ELECTROCARDIOGRAM TRACING: CPT

## 2024-09-22 PROCEDURE — 84484 ASSAY OF TROPONIN QUANT: CPT | Performed by: EMERGENCY MEDICINE

## 2024-09-22 PROCEDURE — 2500000004 HC RX 250 GENERAL PHARMACY W/ HCPCS (ALT 636 FOR OP/ED): Performed by: EMERGENCY MEDICINE

## 2024-09-22 PROCEDURE — 85610 PROTHROMBIN TIME: CPT | Performed by: EMERGENCY MEDICINE

## 2024-09-22 PROCEDURE — 74177 CT ABD & PELVIS W/CONTRAST: CPT

## 2024-09-22 PROCEDURE — 71275 CT ANGIOGRAPHY CHEST: CPT | Performed by: RADIOLOGY

## 2024-09-22 PROCEDURE — 85025 COMPLETE CBC W/AUTO DIFF WBC: CPT | Performed by: EMERGENCY MEDICINE

## 2024-09-22 PROCEDURE — 74177 CT ABD & PELVIS W/CONTRAST: CPT | Performed by: RADIOLOGY

## 2024-09-22 PROCEDURE — 83880 ASSAY OF NATRIURETIC PEPTIDE: CPT | Performed by: EMERGENCY MEDICINE

## 2024-09-22 PROCEDURE — 83605 ASSAY OF LACTIC ACID: CPT | Performed by: EMERGENCY MEDICINE

## 2024-09-22 PROCEDURE — 71045 X-RAY EXAM CHEST 1 VIEW: CPT | Performed by: RADIOLOGY

## 2024-09-22 PROCEDURE — 2550000001 HC RX 255 CONTRASTS: Performed by: STUDENT IN AN ORGANIZED HEALTH CARE EDUCATION/TRAINING PROGRAM

## 2024-09-22 RX ORDER — MORPHINE SULFATE 4 MG/ML
4 INJECTION, SOLUTION INTRAMUSCULAR; INTRAVENOUS ONCE
Status: COMPLETED | OUTPATIENT
Start: 2024-09-22 | End: 2024-09-22

## 2024-09-22 RX ORDER — ONDANSETRON HYDROCHLORIDE 2 MG/ML
4 INJECTION, SOLUTION INTRAVENOUS ONCE
Status: COMPLETED | OUTPATIENT
Start: 2024-09-22 | End: 2024-09-22

## 2024-09-22 RX ORDER — PREDNISONE 20 MG/1
40 TABLET ORAL DAILY
Qty: 10 TABLET | Refills: 0 | Status: SHIPPED | OUTPATIENT
Start: 2024-09-22 | End: 2024-09-27

## 2024-09-22 ASSESSMENT — HEART SCORE
HISTORY: SLIGHTLY SUSPICIOUS
ECG: NORMAL
TROPONIN: LESS THAN OR EQUAL TO NORMAL LIMIT
HEART SCORE: 3
RISK FACTORS: 1-2 RISK FACTORS
AGE: 65+

## 2024-09-22 ASSESSMENT — PAIN SCALES - GENERAL
PAINLEVEL_OUTOF10: 5 - MODERATE PAIN
PAINLEVEL_OUTOF10: 8
PAINLEVEL_OUTOF10: 0 - NO PAIN

## 2024-09-22 ASSESSMENT — COLUMBIA-SUICIDE SEVERITY RATING SCALE - C-SSRS
1. IN THE PAST MONTH, HAVE YOU WISHED YOU WERE DEAD OR WISHED YOU COULD GO TO SLEEP AND NOT WAKE UP?: NO
2. HAVE YOU ACTUALLY HAD ANY THOUGHTS OF KILLING YOURSELF?: NO
6. HAVE YOU EVER DONE ANYTHING, STARTED TO DO ANYTHING, OR PREPARED TO DO ANYTHING TO END YOUR LIFE?: NO

## 2024-09-22 ASSESSMENT — PAIN DESCRIPTION - PAIN TYPE
TYPE: ACUTE PAIN
TYPE: ACUTE PAIN

## 2024-09-22 ASSESSMENT — PAIN DESCRIPTION - LOCATION
LOCATION: BACK
LOCATION: BACK

## 2024-09-22 ASSESSMENT — PAIN - FUNCTIONAL ASSESSMENT
PAIN_FUNCTIONAL_ASSESSMENT: 0-10
PAIN_FUNCTIONAL_ASSESSMENT: 0-10

## 2024-09-22 NOTE — ED PROVIDER NOTES
HPI   Chief Complaint   Patient presents with    Pain With Breathing    Back Pain     Pt states she's been seen for midback pain that she's had for six weeks and now she's having pain a little higher. Pt states she also has a deeper pain when she breathes. Pt denies shortness of breath.        HPI: 69-year-old female states that for 1 day and a half she has been having pleuritic upper back pain.  She states it is worse if she takes a big breath then.  She states that she has a history of low back pain for 6 weeks.  She states that she is not sure if it is because of how she has been walking.  She states that she sees Dr. Beltran and is trying to order an EMG as an outpatient.  She has no loss of bladder or bowel function.  She has no numbness tingling or weakness.  She denies any swelling in her legs.  She denies any history of DVT or PE.    Family HX: Denies any significant/pertinent family history.  Social Hx: Denies ETOH or drug use.  Review of systems:  Gen.: No weight loss, fatigue, anorexia, insomnia, fever.   Eyes: No vision loss, double vision, drainage, eye pain.   ENT: No pharyngitis, dry mouth.   Cardiac: No chest pain, palpitations, syncope, near syncope.   Pulmonary: No shortness of breath, cough, hemoptysis.   Heme/lymph: No swollen glands, fever, bleeding.   GI: No abdominal pain, change in bowel habits, melena, hematemesis, hematochezia, nausea, vomiting, diarrhea.   : No discharge, dysuria, frequency, urgency, hematuria.   Musculoskeletal: No limb pain, joint pain, joint swelling.   Skin: No rashes.   Psych: No depression, anxiety, suicidality, homicidality.   Review of systems is otherwise negative unless stated above or in history of present illness.    Physical Exam:    Appearance: Alert, oriented , cooperative,  in no acute distress. Well nourished & well hydrated.    Skin: Intact,  dry skin, no lesions, rash, petechiae or purpura.     Eyes: PERRLA, EOMs intact,  Conjunctiva pink with no  redness or exudates. Eyelids without lesions. No scleral icterus.     ENT: Hearing grossly intact. External auditory canals patent, tympanic membranes intact with visible landmarks. Nares patent, mucus membranes moist. Dentition without lesions. Pharynx clear, uvula midline.     Neck: Supple, without meningismus. Thyroid not palpable. Trachea at midline. No lymphadenopathy.    Pulmonary: Clear bilaterally with good chest wall excursion. No rales, rhonchi or wheezing. No accessory muscle use or stridor.    Cardiac: Normal S1, S2 without murmur, rub, gallop or extrasystole. No JVD, Carotids without bruits.    Abdomen: Soft, nontender, active bowel sounds.  No palpable organomegaly.  No rebound or guarding.  No CVA tenderness.    Genitourinary: Exam deferred.    Musculoskeletal: Full range of motion. no pain, edema, or deformity. Pulses full and equal. No cyanosis, clubbing, or edema.    Neurological:  Cranial nerves II through XII are grossly intact, finger-nose touch is normal, normal sensation, no weakness, no focal findings identified.    Psychiatric: Appropriate mood and affect.     Medical Decision-Making:  Testing: She will undergo workup including labs, troponin, CT chest to rule out a PE.  Patient be given IV morphine IV fluids.  Endorsed over to the oncoming emergency physician for final diagnoses and disposition.  EKG was obtained which is interpreted by me shows a sinus rhythm rate of 81 nonspecific ST-T wave changes but no evidence of obvious ST elevations to indicate acute ischemia or infarct.  Treatment:   Reevaluation:   Plan: atient and family/friend/caregiver are in agreement with this plan.   Impression:   1.  Pleuritic pain  2.                  Patient History   Past Medical History:   Diagnosis Date    Acute recurrent maxillary sinusitis 06/24/2022    Acute recurrent maxillary sinusitis    Acute recurrent maxillary sinusitis 09/29/2020    Acute recurrent maxillary sinusitis    Bitten or stung by  nonvenomous insect and other nonvenomous arthropods, initial encounter 09/22/2019    Bug bite with infection, initial encounter    Candidiasis, unspecified     Yeast infection    Candidiasis, unspecified     Yeast infection    Cellulitis of right finger 09/25/2019    Cellulitis of right middle finger    Encounter for general adult medical examination without abnormal findings 10/26/2020    Encounter for Medicare annual wellness exam    Encounter for other preprocedural examination 01/17/2020    Pre-op evaluation    Encounter for other screening for malignant neoplasm of breast 10/05/2020    Breast cancer screening    Epidermal cyst 04/13/2021    Inclusion cyst    Flushing 06/20/2019    Vasomotor flushing    Generalized anxiety disorder 08/17/2019    Generalized anxiety disorder    Impacted cerumen, bilateral     Bilateral impacted cerumen    Laceration without foreign body, right lower leg, initial encounter 10/19/2020    Laceration of leg, right    Otalgia, right ear 07/20/2020    Right ear pain    Other conditions influencing health status 08/04/2022    History of cough    Other specified soft tissue disorders 09/25/2019    Swollen finger    Pain in right foot 01/18/2020    Right foot pain    Pain in right hand 10/14/2019    Pain of right hand    Personal history of diseases of the skin and subcutaneous tissue 10/19/2020    History of cellulitis    Personal history of diseases of the skin and subcutaneous tissue 02/02/2021    History of seborrheic keratosis    Personal history of other benign neoplasm 02/02/2021    History of hemangioma    Personal history of other diseases of the musculoskeletal system and connective tissue 01/04/2022    History of joint pain    Personal history of other diseases of the nervous system and sense organs 08/19/2021    History of ear pain    Personal history of other diseases of the respiratory system 08/03/2022    History of acute bronchitis    Personal history of other diseases of  the respiratory system 08/03/2022    History of paranasal sinus congestion    Personal history of other diseases of the respiratory system 08/03/2022    History of sinusitis    Personal history of other drug therapy 09/27/2019    History of influenza vaccination    Personal history of other medical treatment 10/07/2019    History of screening mammography    Personal history of other specified conditions 01/17/2020    History of urinary frequency    Personal history of other specified conditions 01/07/2020    History of urinary urgency    Personal history of other specified conditions 01/07/2020    History of dysuria    Personal history of urinary (tract) infections 12/03/2020    History of urinary tract infection    Presence of unspecified artificial hip joint 05/26/2021    Status post THR (total hip replacement)    Primary insomnia 06/20/2019    Primary insomnia    Unspecified otitis externa, unspecified ear 01/04/2022    Otitis externa    Unspecified otitis externa, unspecified ear 01/04/2022    Otitis externa     Past Surgical History:   Procedure Laterality Date    OTHER SURGICAL HISTORY  09/14/2021    Joint replacement procedure    OTHER SURGICAL HISTORY  09/14/2021    Back surgery     No family history on file.  Social History     Tobacco Use    Smoking status: Never    Smokeless tobacco: Never   Vaping Use    Vaping status: Never Used   Substance Use Topics    Alcohol use: Not Currently    Drug use: Not Currently       Physical Exam   ED Triage Vitals [09/22/24 1805]   Temperature Heart Rate Respirations BP   37.8 °C (100 °F) 89 18 (!) 158/92      Pulse Ox Temp Source Heart Rate Source Patient Position   98 % Temporal Monitor Sitting      BP Location FiO2 (%)     Right arm --       Physical Exam      ED Course & MDM   Diagnoses as of 09/22/24 1922   Pleuritic pain                 No data recorded     [place] Coma Scale Score: 15 (09/22/24 1806 : Anisha Hemphill RN)                           Medical Decision  Making      Procedure  Procedures     Leora Shepherd MD  09/22/24 1922       Leora Shepherd MD  09/22/24 1945

## 2024-09-23 NOTE — ED PROCEDURE NOTE
Procedure  ECG 12 lead    Performed by: Joaquim Foley MD  Authorized by: Leora Shepherd MD    ECG interpreted by ED Physician in the absence of a cardiologist: yes    Rate:     ECG rate:  75  Rhythm:     Rhythm: sinus rhythm    ST segments:     ST segments:  Normal  T waves:     T waves: non-specific                 Joaquim Foley MD  09/22/24 [id number]

## 2024-09-23 NOTE — PROGRESS NOTES
Emergency Medicine Transition of Care Note.    I received Tonie Polanco in signout from Dr. Shepherd.  Please see the previous ED provider note for all HPI, PE and MDM up to the time of signout at 1900. This is in addition to the primary record.    In brief Tonie Polanco is an 69 y.o. female presenting for   Chief Complaint   Patient presents with    Pain With Breathing    Back Pain     Pt states she's been seen for midback pain that she's had for six weeks and now she's having pain a little higher. Pt states she also has a deeper pain when she breathes. Pt denies shortness of breath.      At the time of signout we were awaiting: CT PE results    Diagnoses as of 09/22/24 2223   Pleuritic pain       Medical Decision Making  Took over patient care pending results of CT PE study.  Patient was seen for pleuritic discomfort to the mid back.  Patient's lab work is overall unremarkable.  Troponin negative x 2.  Do not suspect atypical ACS presentation.  CT PE study negative for acute PE.  No acute findings on the CT of the abdomen or pelvis.  Small incidental pulmonary nodule noted in the right upper lobe.  Patient was notified of this finding.  Patient is a non-smoker and overall low risk.  Discussed follow-up with PCP for further management of this.  Patient resting comfortably on reevaluation.  Discussed findings with patient.  Suspect likely inflammatory etiology versus musculoskeletal.  Will discharge on short course of steroids for symptomatic relief of pleuritic pain.    Final diagnoses:   [R07.81] Pleuritic pain           Procedure  Procedures    Joaquim Foley MD

## 2024-09-24 LAB
ATRIAL RATE: 81 BPM
P AXIS: 63 DEGREES
P OFFSET: 213 MS
P ONSET: 168 MS
PR INTERVAL: 112 MS
Q ONSET: 224 MS
QRS COUNT: 14 BEATS
QRS DURATION: 72 MS
QT INTERVAL: 376 MS
QTC CALCULATION(BAZETT): 436 MS
QTC FREDERICIA: 415 MS
R AXIS: 28 DEGREES
T AXIS: 30 DEGREES
T OFFSET: 412 MS
VENTRICULAR RATE: 81 BPM

## 2024-09-25 LAB
ATRIAL RATE: 75 BPM
P AXIS: 50 DEGREES
P OFFSET: 204 MS
P ONSET: 154 MS
PR INTERVAL: 140 MS
Q ONSET: 224 MS
QRS COUNT: 12 BEATS
QRS DURATION: 58 MS
QT INTERVAL: 406 MS
QTC CALCULATION(BAZETT): 453 MS
QTC FREDERICIA: 437 MS
R AXIS: 21 DEGREES
T AXIS: 13 DEGREES
T OFFSET: 427 MS
VENTRICULAR RATE: 75 BPM

## 2024-10-10 ENCOUNTER — HOSPITAL ENCOUNTER (OUTPATIENT)
Dept: RADIOLOGY | Facility: CLINIC | Age: 69
Discharge: HOME | End: 2024-10-10
Payer: MEDICARE

## 2024-10-10 ENCOUNTER — OFFICE VISIT (OUTPATIENT)
Dept: ORTHOPEDIC SURGERY | Facility: CLINIC | Age: 69
End: 2024-10-10
Payer: MEDICARE

## 2024-10-10 DIAGNOSIS — M75.101 TEAR OF RIGHT ROTATOR CUFF, UNSPECIFIED TEAR EXTENT, UNSPECIFIED WHETHER TRAUMATIC: ICD-10-CM

## 2024-10-10 DIAGNOSIS — M25.511 ACUTE PAIN OF RIGHT SHOULDER: ICD-10-CM

## 2024-10-10 PROCEDURE — 2500000004 HC RX 250 GENERAL PHARMACY W/ HCPCS (ALT 636 FOR OP/ED): Performed by: ORTHOPAEDIC SURGERY

## 2024-10-10 PROCEDURE — 99214 OFFICE O/P EST MOD 30 MIN: CPT | Performed by: ORTHOPAEDIC SURGERY

## 2024-10-10 PROCEDURE — 20610 DRAIN/INJ JOINT/BURSA W/O US: CPT | Mod: RT | Performed by: ORTHOPAEDIC SURGERY

## 2024-10-10 PROCEDURE — 73030 X-RAY EXAM OF SHOULDER: CPT | Mod: RT

## 2024-10-10 PROCEDURE — 73030 X-RAY EXAM OF SHOULDER: CPT | Mod: RIGHT SIDE | Performed by: ORTHOPAEDIC SURGERY

## 2024-10-10 RX ORDER — LIDOCAINE HYDROCHLORIDE 10 MG/ML
5 INJECTION, SOLUTION INFILTRATION; PERINEURAL
Status: COMPLETED | OUTPATIENT
Start: 2024-10-10 | End: 2024-10-10

## 2024-10-10 RX ORDER — BETAMETHASONE SODIUM PHOSPHATE AND BETAMETHASONE ACETATE 3; 3 MG/ML; MG/ML
2 INJECTION, SUSPENSION INTRA-ARTICULAR; INTRALESIONAL; INTRAMUSCULAR; SOFT TISSUE
Status: COMPLETED | OUTPATIENT
Start: 2024-10-10 | End: 2024-10-10

## 2024-10-10 NOTE — PROGRESS NOTES
History of present illness: 7 to 8 weeks out from acute shoulder injury    She was carrying a heavy bag of 30 pounds or more and felt a pop or pull in her shoulder    She gave it time, rest, gentle therapy x-ray stretching range of motion program her range of motion did return but she has severe pain NightPain classic drop arm sign with weakness    Physical exam:    General: No acute distress or breathing difficulty or discomfort, pleasant and cooperative with the examination.    Extremities: The right shoulder was inspected and was found to have no obvious deformity.  There was tenderness to touch over the lateral edges of the shoulder over the rotator cuff insertion.  Active forward flexion 120 degrees, external rotation to 60 degrees, abduction to 50 degrees, and internal rotation to the level of L2.    At this time the shoulder is neurovascular tact and neurosensory intact.  Motor intact C5-T1.  There was no obvious neck pain or radiculopathy noted.  There was no gross instability or adhesive capsulitis symptoms.  There was no evidence of apprehension or apprehension suppression.    Strength was tested in 4 planes with weakness in the supraspinatus strength testing and external rotation position.  There was no strength deficit in internal rotation.  Impingement signs were positive both supine and standing for impingement test type I and II.  There was mild pain over the bicipital groove with a positive speeds sign    Before aspiration injection the benefits of a cortisone injection including infection, local skin irritation, skin atrophy, calcification, continued pain and discomfort, elevated blood sugar, burning, failure to relieve pain, possible late infection were discussed with the patient.    Postprocedure discomfort can be alleviated with additional medications, ice, elevation, rest over the first 24 hours as recommended.    Patient verbalized understanding and wanted to proceed with the planned  procedure.    After informed consent was provided and allergies verified, the patient was positioned appropriately on thel bed.  The right shoulder to be aspirated and injected was prepped and draped in a sterile fashion.  The skin was anesthetized with ethyl chloride spray.  A joint aspiration was to be performed an 18-gauge needle was used otherwise a 22-gauge needle was used to inject the appropriate joint.    Joint injection was performed with a mixture of 5 cc 1% lidocaine plain and 2 cc Celestone Soluspan 6 mg per mL.  The needle was removed and the puncture site closed and sealed with a Band-Aid.  The patient tolerated the procedure well.    Diagnostic studies: X-rays are unremarkable 2 views right shoulder    Impression: Right shoulder acute rotator cuff tear    Plan: Injection ice elevation resting the shoulder for comfort so she does not have as much NightPain self-guided exercises for regaining and increasing range of motion MRI for surgical planning we will see her back after MRI right shoulder      L Inj/Asp: R subacromial bursa on 10/10/2024 8:18 AM  Indications: pain  Details: 22 G needle, medial approach  Medications: 2 mL betamethasone acet,sod phos 6 mg/mL; 5 mL lidocaine 10 mg/mL (1 %)  Outcome: tolerated well, no immediate complications  Procedure, treatment alternatives, risks and benefits explained, specific risks discussed. Consent was given by the patient. Immediately prior to procedure a time out was called to verify the correct patient, procedure, equipment, support staff and site/side marked as required.

## 2024-10-11 ENCOUNTER — HOSPITAL ENCOUNTER (OUTPATIENT)
Dept: RADIOLOGY | Facility: CLINIC | Age: 69
Discharge: HOME | End: 2024-10-11
Payer: MEDICARE

## 2024-10-11 DIAGNOSIS — M75.101 TEAR OF RIGHT ROTATOR CUFF, UNSPECIFIED TEAR EXTENT, UNSPECIFIED WHETHER TRAUMATIC: ICD-10-CM

## 2024-10-11 DIAGNOSIS — M25.511 ACUTE PAIN OF RIGHT SHOULDER: ICD-10-CM

## 2024-10-11 PROCEDURE — 73221 MRI JOINT UPR EXTREM W/O DYE: CPT | Mod: RT

## 2024-10-16 ENCOUNTER — OFFICE VISIT (OUTPATIENT)
Dept: ORTHOPEDIC SURGERY | Facility: CLINIC | Age: 69
End: 2024-10-16
Payer: MEDICARE

## 2024-10-16 DIAGNOSIS — M75.101 TEAR OF RIGHT ROTATOR CUFF, UNSPECIFIED TEAR EXTENT, UNSPECIFIED WHETHER TRAUMATIC: Primary | ICD-10-CM

## 2024-10-16 PROCEDURE — 99213 OFFICE O/P EST LOW 20 MIN: CPT | Performed by: ORTHOPAEDIC SURGERY

## 2024-10-16 NOTE — PROGRESS NOTES
History of present illness: History of rotator cuff injury 6 months out traumatic in nature felt a pull or pop got through the summer but had severe pain recent injection gave her some relief        Physical exam:    General: No acute distress or breathing difficulty or discomfort, pleasant and cooperative with the examination.    Extremities: The right shoulder was inspected and was found to have no obvious deformity.  There was tenderness to touch over the lateral edges of the shoulder over the rotator cuff insertion.  Active forward flexion 110 degrees, external rotation to 50 degrees, abduction to 45 degrees, and internal rotation to the level of L2.    At this time the shoulder is neurovascular tact and neurosensory intact.  Motor intact C5-T1.  There was no obvious neck pain or radiculopathy noted.  There was no gross instability or adhesive capsulitis symptoms.  There was no evidence of apprehension or apprehension suppression.    Strength was tested in 4 planes with weakness in the supraspinatus strength testing and external rotation position.  There was no strength deficit in internal rotation.  Impingement signs were positive both supine and standing for impingement test type I and II.  There was mild pain over the bicipital groove with a positive speeds sign    Diagnostic studies: MRI shows full-thickness 2 cm retracted rotator cuff tear there is also some degenerative labral tearing, long head bicep tearing and what looks like to be some chronic anterior subscap tearing    Impression: Right shoulder highly symptomatic acute rotator cuff tear with 2 cm retraction    I think this subscap bicep tear is chronic as is the labral tear she had some relief from the injection but she certainly would like the arm fixed as she is having significant pain and overhead weakness and loss of function    Plan: Arthroscopic rotator cuff repair    Will address and look at the bicep labral complex but I think that may be  chronic and probably just needs to be cleaned out but the main goal of the surgery would be to do a rotator cuff repair of the acute tear while it still repairable    Risk and benefits of surgery discussed extensively with the patient.    Surgical risk included but were not limited to infection, wear, loosening, need for further surgery blood clot, failure to heal, failure of the surgery, stiffness, loss of limb life, extremity function change in length change, and associated risks of  any surgery.    Time in the [facility] rehab PT program she anticipates going back to work a day or 2 later in the sling but she will be in the sling for good 6 weeks we will see her on the operative schedule we give her about an 85% success rate with this type of surgery due to the size of the repair

## 2024-10-17 ENCOUNTER — OFFICE VISIT (OUTPATIENT)
Dept: PRIMARY CARE | Facility: CLINIC | Age: 69
End: 2024-10-17
Payer: COMMERCIAL

## 2024-10-17 VITALS
BODY MASS INDEX: 21.34 KG/M2 | DIASTOLIC BLOOD PRESSURE: 80 MMHG | OXYGEN SATURATION: 98 % | HEIGHT: 64 IN | HEART RATE: 68 BPM | SYSTOLIC BLOOD PRESSURE: 120 MMHG | RESPIRATION RATE: 16 BRPM | WEIGHT: 125 LBS | TEMPERATURE: 97 F

## 2024-10-17 DIAGNOSIS — R05.1 ACUTE COUGH: ICD-10-CM

## 2024-10-17 DIAGNOSIS — J06.9 URI, ACUTE: Primary | ICD-10-CM

## 2024-10-17 PROCEDURE — 99213 OFFICE O/P EST LOW 20 MIN: CPT | Performed by: NURSE PRACTITIONER

## 2024-10-17 RX ORDER — AZITHROMYCIN 250 MG/1
TABLET, FILM COATED ORAL
Qty: 6 TABLET | Refills: 0 | Status: SHIPPED | OUTPATIENT
Start: 2024-10-17 | End: 2024-10-21

## 2024-10-17 RX ORDER — BENZONATATE 200 MG/1
200 CAPSULE ORAL 3 TIMES DAILY PRN
Qty: 42 CAPSULE | Refills: 0 | Status: SHIPPED | OUTPATIENT
Start: 2024-10-17 | End: 2024-11-16

## 2024-10-17 ASSESSMENT — ENCOUNTER SYMPTOMS
NAUSEA: 0
SHORTNESS OF BREATH: 0
HEADACHES: 1
VOMITING: 0
CONSTIPATION: 0
DIZZINESS: 0
FEVER: 0
DIARRHEA: 0
FATIGUE: 0
SORE THROAT: 1
COUGH: 1
CHILLS: 0
PALPITATIONS: 0
ABDOMINAL PAIN: 0
SINUS PAIN: 0
WHEEZING: 0

## 2024-10-17 NOTE — PROGRESS NOTES
"Subjective   Patient ID: Tonie Polanco is a 69 y.o. female who presents for URI.    URI   Episode onset: 2 days. Associated symptoms include congestion, coughing, headaches, sneezing and a sore throat. Pertinent negatives include no abdominal pain, chest pain, diarrhea, ear pain, nausea, rash, sinus pain, vomiting or wheezing. Associated symptoms comments: Sinus pressure, voice loss. She has tried nothing for the symptoms.      69-year-old female presents today complaining of congestion, cough, sneezing and a mild sore throat that started 2 days ago.  She denies any fever or chills.  She denies any chest pain or trouble breathing, but states that her chest does feel sore from coughing.  She denies smoking or vaping.  No history of asthma.  Multiple other family members are sick with similar symptoms.  She has not tried taking anything over-the-counter for symptom relief other than Tylenol for her headaches.  She did take a home COVID test that resulted negative.      Review of Systems   Constitutional:  Negative for chills, fatigue and fever.   HENT:  Positive for congestion, sneezing and sore throat. Negative for ear pain and sinus pain.    Respiratory:  Positive for cough. Negative for shortness of breath and wheezing.    Cardiovascular:  Negative for chest pain and palpitations.   Gastrointestinal:  Negative for abdominal pain, constipation, diarrhea, nausea and vomiting.   Skin:  Negative for rash.   Neurological:  Positive for headaches. Negative for dizziness.       Objective   /80 (BP Location: Left arm, Patient Position: Sitting, BP Cuff Size: Adult)   Pulse 68   Temp 36.1 °C (97 °F) (Temporal)   Resp 16   Ht 1.626 m (5' 4\")   Wt 56.7 kg (125 lb)   SpO2 98%   BMI 21.46 kg/m²     Physical Exam  Vitals and nursing note reviewed.   Constitutional:       General: She is not in acute distress.     Appearance: Normal appearance.   HENT:      Right Ear: Tympanic membrane normal.      Left Ear: " Tympanic membrane normal.      Nose: Congestion present.      Right Sinus: No maxillary sinus tenderness or frontal sinus tenderness.      Left Sinus: No maxillary sinus tenderness or frontal sinus tenderness.      Mouth/Throat:      Pharynx: No oropharyngeal exudate or posterior oropharyngeal erythema.   Eyes:      Conjunctiva/sclera: Conjunctivae normal.   Cardiovascular:      Rate and Rhythm: Normal rate and regular rhythm.      Heart sounds: Normal heart sounds.   Pulmonary:      Effort: Pulmonary effort is normal.      Breath sounds: Normal breath sounds. No wheezing, rhonchi or rales.   Musculoskeletal:      Right lower leg: No edema.      Left lower leg: No edema.   Lymphadenopathy:      Cervical: No cervical adenopathy.   Skin:     General: Skin is warm and dry.   Neurological:      Mental Status: She is alert.   Psychiatric:         Mood and Affect: Mood normal.         Behavior: Behavior normal.         Assessment/Plan   Problem List Items Addressed This Visit    None  Visit Diagnoses         Codes    URI, acute    -  Primary J06.9    Relevant Medications    benzonatate (Tessalon) 200 mg capsule    azithromycin (Zithromax) 250 mg tablet    Acute cough     R05.1        URI: Educated that infection was most likely viral in nature and antibiotics are not indicated at this time.  Increase rest and fluids.  Benzonatate as needed for cough.  If symptoms are lasting more than 10 days, she develops fever/chills or worsening sinus pain/pressure, may fill and take Zithromax as directed.  Follow-up in 1 week with any persisting symptoms, or sooner with any additional concerns.

## 2024-10-18 ENCOUNTER — TELEPHONE (OUTPATIENT)
Dept: PRIMARY CARE | Facility: CLINIC | Age: 69
End: 2024-10-18
Payer: MEDICARE

## 2024-10-18 ENCOUNTER — TELEPHONE (OUTPATIENT)
Dept: ORTHOPEDIC SURGERY | Facility: CLINIC | Age: 69
End: 2024-10-18
Payer: MEDICARE

## 2024-10-18 DIAGNOSIS — R79.1 ABNORMAL COAGULATION PROFILE: ICD-10-CM

## 2024-10-18 DIAGNOSIS — R82.90 UNSPECIFIED ABNORMAL FINDINGS IN URINE: ICD-10-CM

## 2024-10-18 DIAGNOSIS — Z01.818 ENCOUNTER FOR PRE-OPERATIVE EXAMINATION: ICD-10-CM

## 2024-10-18 NOTE — TELEPHONE ENCOUNTER
PT IS SCHED FOR MED CLEARANCE ON 11/14 WITH JACKI BUT WOULD LIKE TO HAVE HER LABS DONE BEFORE SHE COMES IN THE OFFICE FOR THE APPT.     CAN LABS GET ORDERED IN [facility] FOR THE PT     PLEASE CALL WHEN DONE, ALSO ADD THE EARLIEST DATE LABS CAN BE DONE.     SURG IS SCHED FOR  1-2 WEEK OF DECEMBER

## 2024-10-18 NOTE — TELEPHONE ENCOUNTER
Pt called asking about future appointments and procedures. I verified all upcoming appointments and clarified with patient.

## 2024-11-01 ENCOUNTER — HOSPITAL ENCOUNTER (OUTPATIENT)
Dept: NEUROLOGY | Facility: HOSPITAL | Age: 69
Discharge: HOME | End: 2024-11-01
Payer: MEDICARE

## 2024-11-01 DIAGNOSIS — M47.26 OSTEOARTHRITIS OF SPINE WITH RADICULOPATHY, LUMBAR REGION: ICD-10-CM

## 2024-11-01 PROCEDURE — 95911 NRV CNDJ TEST 9-10 STUDIES: CPT

## 2024-11-04 ENCOUNTER — OFFICE VISIT (OUTPATIENT)
Dept: PRIMARY CARE | Facility: CLINIC | Age: 69
End: 2024-11-04
Payer: MEDICARE

## 2024-11-04 ENCOUNTER — TELEPHONE (OUTPATIENT)
Dept: ORTHOPEDIC SURGERY | Facility: CLINIC | Age: 69
End: 2024-11-04

## 2024-11-04 ENCOUNTER — APPOINTMENT (OUTPATIENT)
Dept: NEUROLOGY | Facility: HOSPITAL | Age: 69
End: 2024-11-04
Payer: MEDICARE

## 2024-11-04 VITALS
SYSTOLIC BLOOD PRESSURE: 120 MMHG | BODY MASS INDEX: 24.54 KG/M2 | HEIGHT: 60 IN | RESPIRATION RATE: 16 BRPM | TEMPERATURE: 97.7 F | OXYGEN SATURATION: 97 % | WEIGHT: 125 LBS | HEART RATE: 75 BPM | DIASTOLIC BLOOD PRESSURE: 80 MMHG

## 2024-11-04 DIAGNOSIS — H92.02 LEFT EAR PAIN: Primary | ICD-10-CM

## 2024-11-04 DIAGNOSIS — H66.92 LEFT OTITIS MEDIA, UNSPECIFIED OTITIS MEDIA TYPE: ICD-10-CM

## 2024-11-04 DIAGNOSIS — K52.9 GASTROENTERITIS: ICD-10-CM

## 2024-11-04 DIAGNOSIS — I10 PRIMARY HYPERTENSION: ICD-10-CM

## 2024-11-04 PROBLEM — H83.02: Status: ACTIVE | Noted: 2024-11-04

## 2024-11-04 PROCEDURE — 3074F SYST BP LT 130 MM HG: CPT | Performed by: FAMILY MEDICINE

## 2024-11-04 PROCEDURE — 1159F MED LIST DOCD IN RCRD: CPT | Performed by: FAMILY MEDICINE

## 2024-11-04 PROCEDURE — 1123F ACP DISCUSS/DSCN MKR DOCD: CPT | Performed by: FAMILY MEDICINE

## 2024-11-04 PROCEDURE — 3008F BODY MASS INDEX DOCD: CPT | Performed by: FAMILY MEDICINE

## 2024-11-04 PROCEDURE — 1036F TOBACCO NON-USER: CPT | Performed by: FAMILY MEDICINE

## 2024-11-04 PROCEDURE — 3079F DIAST BP 80-89 MM HG: CPT | Performed by: FAMILY MEDICINE

## 2024-11-04 PROCEDURE — 1158F ADVNC CARE PLAN TLK DOCD: CPT | Performed by: FAMILY MEDICINE

## 2024-11-04 PROCEDURE — 99214 OFFICE O/P EST MOD 30 MIN: CPT | Performed by: FAMILY MEDICINE

## 2024-11-04 RX ORDER — LEVOFLOXACIN 500 MG/1
500 TABLET, FILM COATED ORAL DAILY
Qty: 10 TABLET | Refills: 0 | Status: SHIPPED | OUTPATIENT
Start: 2024-11-04 | End: 2024-11-14

## 2024-11-04 NOTE — PROGRESS NOTES
Subjective   Patient ID: Tonie Polanco is a 69 y.o. female who presents for Earache and Diarrhea.    HPI   Patient is present today with left ear infection and diarrhea. The ear infection been going on for 4 days now. Patient states it hurts to move her neck. Left Ears feel like a heaviness and there is like a sharp pain that she she gets in her left ear. Patient feels like when she gets up off balance.    Patient is also having diarrhea and that's been going on for 8 days now. Patient states she was woke up at 4am this morning and she had to run to the bathroom.       Review of Systems  12 Systems have been reviewed as follows.  Constitutional: Fever, weight gain, weight loss, appetite change, night sweats, fatigue, chills.  Eyes : blurry, double vision, vision, loss, tearing, redness, pain, sensitivity to light, glaucoma.  Ears: nose, mouth, and throat: Hearing loss, ringing in the ears, ear pain, nasal congestion, nasal drainage, nosebleeds, mouth, throat, irritation tooth problem.  Cardiovascular: chest pain, pressure, heart racing, palpitations, sweating, leg swelling, high or low blood pressure  Pulmonary: Cough, yellow or green sputum, blood and sputum, shortness of breath, wheezing  Gastrointestinal: Nausea, vomiting, diarrhea, constipation, pain, blood in stool, or vomitus, heartburn, difficulty swallowing  Musculoskeletal: Pain, stiffness, joint, redness or warmth, arthritis, back pain, weakness, muscle wasting, sprain or fracture  Neuro: Weight weakness, dizziness, change in voice, change in taste change in vision, change in hearing, loss, or change of sensation, trouble walking, balance problems coordination problems, shaking, speech problem  Endocrine: cold or heat intolerance, blood sugar problem, weight gain or loss missed periods hot flashes, sweats, change in body hair, change in libido, increased thirst, increased urination  Heme/lymph: Swelling, bleeding, problem anemia, bruising, enlarged lymph  nodes  Allergic/immunologic: H. plus nasal drip, watery itchy eyes, nasal drainage, immunosuppressed  The above were reviewed and noted negative except as noted in HPI and Problem List.      Objective   /80   Pulse 75   Temp 36.5 °C (97.7 °F) (Temporal)   Resp 16   Ht 1.524 m (5')   Wt 56.7 kg (125 lb)   SpO2 97%   BMI 24.41 kg/m²     Physical Exam  PHYSICAL EXAM:  Constitutional: Well developed, well nourished, alert and in no acute distress   Eyes: Normal external exam. Pupils equally round and reactive to light with normal accommodation and extraocular movements intact.  Neck: Supple, no lymphadenopathy or masses.   Cardiovascular: Regular rate and rhythm, normal S1 and S2, no murmurs, gallops, or rubs. Radial pulses normal. No peripheral edema.  Abdomen: soft, non tender, non distended, no masses or HSM.   Pulmonary: No respiratory distress, lungs clear to auscultation bilaterally. No wheezes, rhonchi, rales.  Skin: Warm, well perfused, normal skin turgor and color.   Neurologic: Cranial nerves II-XII grossly intact.   Psychiatric: Mood calm and affect normal      Assessment/Plan   Problem List Items Addressed This Visit             ICD-10-CM    HTN (hypertension) I10    Gastroenteritis K52.9    Left ear pain - Primary H92.02    Relevant Medications    levoFLOXacin (Levaquin) 500 mg tablet     Other Visit Diagnoses         Codes    Left otitis media, unspecified otitis media type     H66.92             Scribe Attestation  By signing my name below, IAnastasia Scribe   attest that this documentation has been prepared under the direction and in the presence of Vimal Roblero DO.   Provider Attestation - Scribe documentation    All medical record entries made by the Scribe were at my direction and personally dictated by me. I have reviewed the chart and agree that the record accurately reflects my personal performance of the history, physical exam, discussion and plan.     -start LevaQuin 500 mg  for ear pain

## 2024-11-04 NOTE — TELEPHONE ENCOUNTER
I called her back,  she wanted to make sure she could still have surgery because of her cortisone injection

## 2024-11-04 NOTE — TELEPHONE ENCOUNTER
PT. HEIDIM stating she has a few SX questions, sx is schedule for 12/3/24  She can be reach at [phone]

## 2024-11-11 ENCOUNTER — TELEPHONE (OUTPATIENT)
Dept: ORTHOPEDIC SURGERY | Facility: CLINIC | Age: 69
End: 2024-11-11
Payer: MEDICARE

## 2024-11-11 NOTE — TELEPHONE ENCOUNTER
Patient was given a medication for her colitis. Patient wants to know if she is allowed to take this due to her upcoming surgery. Medication is Budesonide

## 2024-11-12 ENCOUNTER — OFFICE VISIT (OUTPATIENT)
Dept: ORTHOPEDIC SURGERY | Facility: CLINIC | Age: 69
End: 2024-11-12
Payer: MEDICARE

## 2024-11-12 DIAGNOSIS — M47.26 OSTEOARTHRITIS OF SPINE WITH RADICULOPATHY, LUMBAR REGION: ICD-10-CM

## 2024-11-12 PROCEDURE — 99214 OFFICE O/P EST MOD 30 MIN: CPT | Performed by: ORTHOPAEDIC SURGERY

## 2024-11-12 PROCEDURE — 99213 OFFICE O/P EST LOW 20 MIN: CPT | Performed by: ORTHOPAEDIC SURGERY

## 2024-11-12 PROCEDURE — 1123F ACP DISCUSS/DSCN MKR DOCD: CPT | Performed by: ORTHOPAEDIC SURGERY

## 2024-11-12 PROCEDURE — 1036F TOBACCO NON-USER: CPT | Performed by: ORTHOPAEDIC SURGERY

## 2024-11-12 PROCEDURE — 1159F MED LIST DOCD IN RCRD: CPT | Performed by: ORTHOPAEDIC SURGERY

## 2024-11-12 NOTE — PROGRESS NOTES
Chief complaint: Back pain left buttock pain    HPI: Patient with a many year history of back pain but over the past year her back pain has increased.  The pain is localized to lower lumbar spine.  Is worse with activities and better with rest.  She also has pain in the left SI joint and lateral hip and buttock area.  She has had that hip replaced on that side twice.  There is no fevers chills nausea vomiting.  There is no bowel or bladder changes.  There is no right-sided symptoms.  She is now getting little bit of hamstring and pain and pain behind her left knee.  She has had a history of surgery on her back about 30 years ago.  No other surgeries on her back.  She has had no recent treatments for this at all.  No physical therapy, chiropractic pain management or acupuncture.    Physical exam: Well-nourished, well kept.  Good perfusion to the extremities ×4.  Radial and dorsalis pedis pulses 2+.  Capillary refill to all 4 digits brisk.  No distal edema x 4.  No lymphangitis or lymphadenopathy in the examined extremities.  Gait normal.  Can walk on heels and toes.  Examination of the neck reveals no swelling, step-off, or point tenderness.  Range of motion with flexion, extension, side bending and rotation is well maintained without crepitance, instability, or exacerbation of pain.  Strength is within normal limits.  Thoracic spine is nontender.  Examination of the back shows tenderness in the paraspinous musculature.  There is decreased range of motion in all directions due to guarding/muscle spasms and pain at extremes.  There is good strength and no instability.  Examination of the lower extremities reveals no point tenderness, swelling, or deformity.  Range of motion of the hips, knees, and ankles are full without crepitance, instability, or exacerbation of pain.  Strength is 5/5 throughout except exquisite tenderness over the left hip greater trochanteric bursa which reproduces a lot of her left-sided hip and  buttock pain..  No redness, abrasions, or lesions on all 4 extremities, head and neck, or trunk.  Gross sensation intact in the extremities ×4.  Deep tendon reflexes 2+ and symmetric bilaterally.  Alex, clonus, and Babinski were negative.  Straight leg raise negative.  Affect normal.  Alert and oriented ×3.  Coordination normal.    X-rays were reviewed.  MRI was reviewed as well.  The patient has degenerative scoliosis of her lumbar spine.  There is no stenosis anywhere in her lumbar spine that would give her any radicular symptoms or any pain in her legs.  Her arthritis would certainly give her a lot of back pain.    Assessment/plan: Patient with mechanical back pain and left hip bursitis.  She does not have a radiculopathy.  There is no stenosis anywhere in her lumbar spine.  She is not a surgical spine candidate as there is no surgery that would help her with her pain in her back or her left buttock and hip area.  She is severely inhibited bodily function given the amount of pain.  We are going to get her into physical therapy and pain management.  She is going to see a hip surgeon to discuss the bursitis that she is having and pain in her left hip area.  She can follow-up with me on a as needed basis.  I reviewed the notes of Daniel Talamantes who saw this patient 9/19/2024 for back pain.

## 2024-11-14 ENCOUNTER — OFFICE VISIT (OUTPATIENT)
Dept: ORTHOPEDIC SURGERY | Facility: CLINIC | Age: 69
End: 2024-11-14
Payer: MEDICARE

## 2024-11-14 ENCOUNTER — APPOINTMENT (OUTPATIENT)
Dept: PRIMARY CARE | Facility: CLINIC | Age: 69
End: 2024-11-14
Payer: MEDICARE

## 2024-11-14 VITALS — WEIGHT: 120 LBS | BODY MASS INDEX: 20.49 KG/M2 | HEIGHT: 64 IN

## 2024-11-14 DIAGNOSIS — M76.892 TENDONITIS INVOLVING LEFT HIP ABDUCTORS: ICD-10-CM

## 2024-11-14 PROCEDURE — 20610 DRAIN/INJ JOINT/BURSA W/O US: CPT | Mod: LT | Performed by: ORTHOPAEDIC SURGERY

## 2024-11-14 PROCEDURE — 99214 OFFICE O/P EST MOD 30 MIN: CPT | Performed by: ORTHOPAEDIC SURGERY

## 2024-11-14 PROCEDURE — 1125F AMNT PAIN NOTED PAIN PRSNT: CPT | Performed by: ORTHOPAEDIC SURGERY

## 2024-11-14 PROCEDURE — 3008F BODY MASS INDEX DOCD: CPT | Performed by: ORTHOPAEDIC SURGERY

## 2024-11-14 PROCEDURE — 1123F ACP DISCUSS/DSCN MKR DOCD: CPT | Performed by: ORTHOPAEDIC SURGERY

## 2024-11-14 PROCEDURE — 2500000004 HC RX 250 GENERAL PHARMACY W/ HCPCS (ALT 636 FOR OP/ED): Performed by: ORTHOPAEDIC SURGERY

## 2024-11-14 RX ORDER — BETAMETHASONE SODIUM PHOSPHATE AND BETAMETHASONE ACETATE 3; 3 MG/ML; MG/ML
2 INJECTION, SUSPENSION INTRA-ARTICULAR; INTRALESIONAL; INTRAMUSCULAR; SOFT TISSUE
Status: COMPLETED | OUTPATIENT
Start: 2024-11-14 | End: 2024-11-14

## 2024-11-14 RX ORDER — LIDOCAINE HYDROCHLORIDE 10 MG/ML
5 INJECTION, SOLUTION INFILTRATION; PERINEURAL
Status: COMPLETED | OUTPATIENT
Start: 2024-11-14 | End: 2024-11-14

## 2024-11-14 ASSESSMENT — PAIN SCALES - GENERAL: PAINLEVEL_OUTOF10: 8

## 2024-11-14 ASSESSMENT — PAIN - FUNCTIONAL ASSESSMENT: PAIN_FUNCTIONAL_ASSESSMENT: 0-10

## 2024-11-14 NOTE — PROGRESS NOTES
History of present illness: Patient with ongoing left hip pain patient had a total hip back in 2018 and then a revision on 11/21/2022    She has developed trochanteric bursitis and weakness with the abductor she cannot do a single straight leg raise or a single-leg stand due to weakness and a Trendelenburg gait left hip    Physical exam:    General: No acute distress or breathing difficulty or discomfort, pleasant and cooperative with the examination.    Extremities: The affected left hip was examined and inspected.  There was tenderness to touch along the groin and over the lateral aspect of the hip over the bursal area.  Hip joint moves freely.    There was no pain over the groin area to internal/external rotation abduction.    Palpable reproducible pain was reproduced with palpation over the lateral trochanteric process.  There was a tight IT band with a positive Jorge sign.      The skin was intact without breakdown or open wound.  Old incisions of present were all healed.  There was mild crepitus seen with internal and external rotation and range of motion without evidence of gross instability.    Inspection of the low back showed normal curvature integrity of the skin.  The strength and stability of the low back and ligaments were within normal limits.    There was a normal straight leg raise with no foot drop, numbness or tingling in the bilateral lower extremities.  Sensation, reflexes and pulses in the foot and ankle are preserved and present.  There was no obvious effusion.    Range of motion showed flexion to beyond 100 degrees degrees, abduction to 30 degrees, external rotation to 15 degrees and 18 degrees of internal rotation.  The patient had the ability to bear weight but with discomfort.  The patient's gait antalgic  secondary to discomfort      Before aspiration injection the risks of a cortisone injection including infection, local skin irritation, skin atrophy, calcification, continued pain and  discomfort, elevated blood sugar, burning, failure to relieve pain, possible late infection were discussed with the patient.    Postprocedure discomfort can be alleviated with additional medications, ice, elevation, rest over the first 24 hours as recommended.    Patient verbalized understanding and wanted to proceed with the planned procedure.    After informed consent was provided and allergies verified, the patient was positioned appropriately on the  bed.  The left hip to be aspirated and injected was prepped and draped in a sterile fashion.  The skin was anesthetized with ethyl chloride spray.  A joint aspiration was to be performed an 18-gauge needle was used otherwise a 22-gauge needle was used to inject the appropriate join  Joint injection was performed with a mixture of 5 cc 1% lidocaine plain and 2 cc Celestone Soluspan 6 mg per mL.  The needle was removed and the puncture site closed and sealed with a Band-Aid.  The patient tolerated the procedure well.    Diagnostic studies: X-rays reviewed showing unremarkable total    Impression: Left hip trochanteric bursitis possible abductor muscle tear due to weakness and inability to stand on 1 leg    Plan: Hip injection for comfort    PT therapy stretching program can be beneficial    I will see her back after MRI to evaluate the right hip for abductor muscle tear    L Inj/Asp: L greater trochanteric bursa on 11/14/2024 4:32 PM  Indications: pain and diagnostic evaluation  Details: 25 G needle, lateral approach  Medications: 5 mL lidocaine 10 mg/mL (1 %); 2 mL betamethasone acet,sod phos 6 mg/mL  Outcome: tolerated well, no immediate complications  Procedure, treatment alternatives, risks and benefits explained, specific risks discussed. Consent was given by the patient. Immediately prior to procedure a time out was called to verify the correct patient, procedure, equipment, support staff and site/side marked as required. Patient was prepped and draped in the  usual sterile fashion.

## 2024-11-19 ENCOUNTER — HOSPITAL ENCOUNTER (OUTPATIENT)
Dept: RADIOLOGY | Facility: CLINIC | Age: 69
Discharge: HOME | End: 2024-11-19
Payer: MEDICARE

## 2024-11-19 DIAGNOSIS — M76.892 TENDONITIS INVOLVING LEFT HIP ABDUCTORS: ICD-10-CM

## 2024-11-19 PROCEDURE — 73721 MRI JNT OF LWR EXTRE W/O DYE: CPT | Mod: LT

## 2024-11-20 ENCOUNTER — APPOINTMENT (OUTPATIENT)
Dept: RADIOLOGY | Facility: CLINIC | Age: 69
End: 2024-11-20
Payer: MEDICARE

## 2024-11-25 ENCOUNTER — APPOINTMENT (OUTPATIENT)
Dept: ORTHOPEDIC SURGERY | Facility: CLINIC | Age: 69
End: 2024-11-25
Payer: MEDICARE

## 2024-11-25 DIAGNOSIS — M76.892 TENDONITIS INVOLVING LEFT HIP ABDUCTORS: Primary | ICD-10-CM

## 2024-11-25 PROCEDURE — 99213 OFFICE O/P EST LOW 20 MIN: CPT | Performed by: ORTHOPAEDIC SURGERY

## 2024-11-25 PROCEDURE — 1123F ACP DISCUSS/DSCN MKR DOCD: CPT | Performed by: ORTHOPAEDIC SURGERY

## 2024-11-25 NOTE — PROGRESS NOTES
History of present illness: Patient here to go over evaluation left hip MRI bursa shot did not help MRI is essentially fairly clean she does have some discogenic back pain and some endplate compression of 4 5 and little SI joint irritation as well    Physical exam:    General: No acute distress or breathing difficulty or discomfort, pleasant and cooperative with the examination.    Extremities: Moderate hip bursitis pain symptoms not relieved from an injection pain across the low back    She can straight leg raise flex and extend there is no hip or groin pain there is no pain in the groin area with abduction rotation or flexion extension    She can plantarflex dorsally toes foot and ankle we talked about referral to our spine team she declined she said she is happy to know that the hip implant has no additional fluid and there is no abductor muscle tearing    Diagnostic studies: MRI essentially shows a normal total hip no Jeff plus static inflammation or osteolysis discogenic changes of the lumbar sacral spine    Impression: Probably combination of SI joint and low spine irritation with arthritic change and some endplate compression of L4    Plan: Follow-up as needed gentle range of motion low impact program no surgery required we will see her back in the office in the future should pain increase around the hip

## 2024-11-26 ENCOUNTER — APPOINTMENT (OUTPATIENT)
Dept: PRIMARY CARE | Facility: CLINIC | Age: 69
End: 2024-11-26
Payer: MEDICARE

## 2024-11-26 VITALS
SYSTOLIC BLOOD PRESSURE: 102 MMHG | HEART RATE: 59 BPM | BODY MASS INDEX: 21.34 KG/M2 | HEIGHT: 64 IN | OXYGEN SATURATION: 99 % | TEMPERATURE: 97.7 F | DIASTOLIC BLOOD PRESSURE: 58 MMHG | WEIGHT: 125 LBS | RESPIRATION RATE: 12 BRPM

## 2024-11-26 DIAGNOSIS — D50.8 IRON DEFICIENCY ANEMIA SECONDARY TO INADEQUATE DIETARY IRON INTAKE: ICD-10-CM

## 2024-11-26 DIAGNOSIS — Z12.31 ENCOUNTER FOR SCREENING MAMMOGRAM FOR BREAST CANCER: ICD-10-CM

## 2024-11-26 DIAGNOSIS — Z00.00 MEDICARE ANNUAL WELLNESS VISIT, SUBSEQUENT: Primary | ICD-10-CM

## 2024-11-26 DIAGNOSIS — E78.5 DYSLIPIDEMIA: ICD-10-CM

## 2024-11-26 DIAGNOSIS — I34.0 NONRHEUMATIC MITRAL VALVE REGURGITATION: ICD-10-CM

## 2024-11-26 PROCEDURE — 99497 ADVNCD CARE PLAN 30 MIN: CPT | Performed by: FAMILY MEDICINE

## 2024-11-26 PROCEDURE — G0439 PPPS, SUBSEQ VISIT: HCPCS | Performed by: FAMILY MEDICINE

## 2024-11-26 ASSESSMENT — ENCOUNTER SYMPTOMS
DECREASED CONCENTRATION: 0
NECK STIFFNESS: 0
DYSPHORIC MOOD: 0
SLEEP DISTURBANCE: 0
SORE THROAT: 0
HEMATURIA: 0
SINUS PRESSURE: 0
SPEECH DIFFICULTY: 0
ADENOPATHY: 0
CONSTITUTIONAL NEGATIVE: 1
OCCASIONAL FEELINGS OF UNSTEADINESS: 0
LOSS OF SENSATION IN FEET: 0
DIZZINESS: 0
CONSTIPATION: 0
BLOOD IN STOOL: 0
ABDOMINAL DISTENTION: 0
AGITATION: 0
DEPRESSION: 0
CHEST TIGHTNESS: 0
RECTAL PAIN: 0
APPETITE CHANGE: 0
ACTIVITY CHANGE: 0
ABDOMINAL PAIN: 0
PALPITATIONS: 0
COUGH: 0
HEADACHES: 0
FEVER: 0
RHINORRHEA: 0
CONFUSION: 0
SINUS PAIN: 0
NERVOUS/ANXIOUS: 1
MYALGIAS: 0
TROUBLE SWALLOWING: 0
SHORTNESS OF BREATH: 0
EYE PAIN: 0
FLANK PAIN: 0
SEIZURES: 0
FATIGUE: 0
DYSURIA: 0
STRIDOR: 0
ARTHRALGIAS: 0
DIARRHEA: 0
COLOR CHANGE: 0
PHOTOPHOBIA: 0
POLYDIPSIA: 0
POLYPHAGIA: 0

## 2024-11-26 ASSESSMENT — PATIENT HEALTH QUESTIONNAIRE - PHQ9
SUM OF ALL RESPONSES TO PHQ9 QUESTIONS 1 AND 2: 0
2. FEELING DOWN, DEPRESSED OR HOPELESS: NOT AT ALL
1. LITTLE INTEREST OR PLEASURE IN DOING THINGS: NOT AT ALL

## 2024-11-26 ASSESSMENT — ACTIVITIES OF DAILY LIVING (ADL)
DRESSING: INDEPENDENT
MANAGING_FINANCES: INDEPENDENT
DOING_HOUSEWORK: INDEPENDENT
TAKING_MEDICATION: INDEPENDENT
BATHING: INDEPENDENT
GROCERY_SHOPPING: INDEPENDENT

## 2024-11-26 NOTE — PROGRESS NOTES
Subjective   Reason for Visit: Tonie Polanco is an 69 y.o. female here for a Medicare Wellness visit.     Past Medical, Surgical, and Family History reviewed and updated in chart.    Reviewed all medications by prescribing practitioner or clinical pharmacist (such as prescriptions, OTCs, herbal therapies and supplements) and documented in the medical record.    Patient presents today for annual medicare visit.     Patient was scheduled to have right rotator cuff surgery but canceled     Denies any concerns today         Patient Care Team:  Mathew Batista DO as PCP - General (Family Medicine)  Mathew Batista DO [facility] PCP [facility]     Review of Systems   Constitutional: Negative.  Negative for activity change, appetite change, fatigue and fever.   HENT:  Negative for congestion, dental problem, ear discharge, ear pain, mouth sores, rhinorrhea, sinus pressure, sinus pain, sore throat, tinnitus and trouble swallowing.    Eyes:  Negative for photophobia, pain and visual disturbance.   Respiratory:  Negative for cough, chest tightness, shortness of breath and stridor.    Cardiovascular:  Negative for chest pain and palpitations.   Gastrointestinal:  Negative for abdominal distention, abdominal pain, blood in stool, constipation, diarrhea and rectal pain.   Endocrine: Negative for cold intolerance, heat intolerance, polydipsia, polyphagia and polyuria.   Genitourinary:  Negative for dysuria, flank pain, hematuria and urgency.   Musculoskeletal:  Negative for arthralgias, gait problem, myalgias and neck stiffness.   Skin:  Negative for color change and rash.   Allergic/Immunologic: Negative for environmental allergies and food allergies.   Neurological:  Negative for dizziness, seizures, syncope, speech difficulty and headaches.   Hematological:  Negative for adenopathy.   Psychiatric/Behavioral:  Negative for agitation, confusion, decreased concentration, dysphoric mood and sleep disturbance.  "The patient is nervous/anxious.        Objective   Vitals:  /58 (BP Location: Left arm, Patient Position: Sitting, BP Cuff Size: Adult)   Pulse 59   Temp 36.5 °C (97.7 °F) (Oral)   Resp 12   Ht 1.626 m (5' 4\")   Wt 56.7 kg (125 lb)   SpO2 99%   BMI 21.46 kg/m²       Physical Exam  Vitals reviewed.   Constitutional:       General: She is not in acute distress.     Appearance: Normal appearance. She is normal weight. She is not ill-appearing or diaphoretic.   HENT:      Head: Normocephalic.      Right Ear: Tympanic membrane and external ear normal.      Left Ear: Tympanic membrane and external ear normal.      Nose: Nose normal. No congestion.      Mouth/Throat:      Pharynx: No posterior oropharyngeal erythema.   Eyes:      General:         Right eye: No discharge.         Left eye: No discharge.      Extraocular Movements: Extraocular movements intact.      Conjunctiva/sclera: Conjunctivae normal.      Pupils: Pupils are equal, round, and reactive to light.   Cardiovascular:      Rate and Rhythm: Normal rate and regular rhythm.      Pulses: Normal pulses.      Heart sounds: Normal heart sounds. No murmur heard.  Pulmonary:      Effort: Pulmonary effort is normal. No respiratory distress.      Breath sounds: Normal breath sounds. No wheezing or rales.   Chest:      Chest wall: No tenderness.   Abdominal:      General: Abdomen is flat. Bowel sounds are normal. There is no distension.      Palpations: There is no mass.      Tenderness: There is no abdominal tenderness. There is no guarding.   Musculoskeletal:         General: No tenderness. Normal range of motion.      Cervical back: Normal range of motion and neck supple. No tenderness.      Right lower leg: No edema.      Left lower leg: No edema.   Skin:     General: Skin is dry.      Coloration: Skin is not jaundiced.      Findings: No bruising, erythema or rash.   Neurological:      General: No focal deficit present.      Mental Status: She is alert " and oriented to person, place, and time. Mental status is at baseline.      Cranial Nerves: No cranial nerve deficit.      Sensory: No sensory deficit.      Coordination: Coordination normal.      Gait: Gait normal.   Psychiatric:         Mood and Affect: Mood normal.         Thought Content: Thought content normal.         Judgment: Judgment normal.         Assessment & Plan  Medicare annual wellness visit, subsequent         Encounter for screening mammogram for breast cancer    Orders:    BI mammo bilateral screening tomosynthesis; Future    Nonrheumatic mitral valve regurgitation    Orders:    Transthoracic Echo (TTE) Complete; Future    Dyslipidemia    Orders:    Lipid Panel; Future    Comprehensive Metabolic Panel; Future    Iron deficiency anemia secondary to inadequate dietary iron intake    Orders:    CBC and Auto Differential; Future           Advance Directives Discussion  16 - 20 minutes were spent discussing Advanced Care Planning (including a Living Will, Medical Power Of , as well as specific end of life choices and/or directives). The details of that discussion were documented in Advanced Directives Discussion section of the medical record.

## 2024-11-26 NOTE — PATIENT INSTRUCTIONS
Follow up in 3 months    Continue current medications and therapy for chronic medical conditions.    Patient was advised importance of proper diet/nutrition in addition adequate hydration. Patient was encouraged moderate exercise program to include 30 minutes daily for 5 days of the week or 150 minutes weekly. Patient will follow-up with us as scheduled.    Complete Medicare annual wellness physical/exam    Counseled on advanced directives/16 minutes    Review lab results from September 2024    Schedule for screening mammogram

## 2024-11-27 ENCOUNTER — APPOINTMENT (OUTPATIENT)
Dept: ORTHOPEDIC SURGERY | Facility: CLINIC | Age: 69
End: 2024-11-27
Payer: MEDICARE

## 2024-12-03 ENCOUNTER — TELEPHONE (OUTPATIENT)
Dept: PRIMARY CARE | Facility: CLINIC | Age: 69
End: 2024-12-03
Payer: MEDICARE

## 2024-12-03 DIAGNOSIS — J01.01 ACUTE RECURRENT MAXILLARY SINUSITIS: Primary | ICD-10-CM

## 2024-12-03 RX ORDER — AZITHROMYCIN 250 MG/1
TABLET, FILM COATED ORAL
Qty: 6 TABLET | Refills: 0 | Status: SHIPPED | OUTPATIENT
Start: 2024-12-03 | End: 2024-12-08

## 2024-12-03 NOTE — TELEPHONE ENCOUNTER
Dr Batista pt    Pt phoned office and stated she has a sinus infection and would like LUIS ANTONIO to call in a antibiotic for her. Pt was just here on 11/26      [address]

## 2024-12-04 ENCOUNTER — APPOINTMENT (OUTPATIENT)
Dept: ORTHOPEDIC SURGERY | Facility: CLINIC | Age: 69
End: 2024-12-04
Payer: MEDICARE

## 2024-12-16 ENCOUNTER — APPOINTMENT (OUTPATIENT)
Dept: PRIMARY CARE | Facility: CLINIC | Age: 69
End: 2024-12-16
Payer: MEDICARE

## 2024-12-16 VITALS
HEIGHT: 64 IN | BODY MASS INDEX: 21.17 KG/M2 | OXYGEN SATURATION: 97 % | TEMPERATURE: 98 F | WEIGHT: 124 LBS | DIASTOLIC BLOOD PRESSURE: 80 MMHG | HEART RATE: 67 BPM | SYSTOLIC BLOOD PRESSURE: 126 MMHG | RESPIRATION RATE: 18 BRPM

## 2024-12-16 DIAGNOSIS — M19.011 PRIMARY OSTEOARTHRITIS OF RIGHT SHOULDER: ICD-10-CM

## 2024-12-16 DIAGNOSIS — M47.16 OSTEOARTHRITIS OF LUMBAR SPINE WITH MYELOPATHY: ICD-10-CM

## 2024-12-16 DIAGNOSIS — Z01.818 PRE-OP EXAM: Primary | ICD-10-CM

## 2024-12-16 DIAGNOSIS — F41.1 GENERALIZED ANXIETY DISORDER: ICD-10-CM

## 2024-12-16 DIAGNOSIS — M81.0 AGE-RELATED OSTEOPOROSIS WITHOUT CURRENT PATHOLOGICAL FRACTURE: ICD-10-CM

## 2024-12-16 DIAGNOSIS — I10 PRIMARY HYPERTENSION: ICD-10-CM

## 2024-12-16 PROCEDURE — 99214 OFFICE O/P EST MOD 30 MIN: CPT | Performed by: FAMILY MEDICINE

## 2024-12-16 PROCEDURE — G2211 COMPLEX E/M VISIT ADD ON: HCPCS | Performed by: FAMILY MEDICINE

## 2024-12-16 ASSESSMENT — ENCOUNTER SYMPTOMS
SHORTNESS OF BREATH: 0
POLYDIPSIA: 0
STRIDOR: 0
RECTAL PAIN: 0
DIZZINESS: 0
SINUS PAIN: 0
HEADACHES: 0
FLANK PAIN: 0
COLOR CHANGE: 0
NECK STIFFNESS: 0
COUGH: 0
CONFUSION: 0
DIARRHEA: 0
SEIZURES: 0
TROUBLE SWALLOWING: 0
PHOTOPHOBIA: 0
CONSTITUTIONAL NEGATIVE: 1
POLYPHAGIA: 0
NERVOUS/ANXIOUS: 1
EYE PAIN: 0
CHEST TIGHTNESS: 0
ABDOMINAL DISTENTION: 0
AGITATION: 0
FEVER: 0
DYSPHORIC MOOD: 0
PALPITATIONS: 0
ADENOPATHY: 0
ACTIVITY CHANGE: 0
RHINORRHEA: 0
SINUS PRESSURE: 0
SORE THROAT: 0
DECREASED CONCENTRATION: 0
CONSTIPATION: 0
ARTHRALGIAS: 1
FATIGUE: 0
HEMATURIA: 0
APPETITE CHANGE: 0
DYSURIA: 0
MYALGIAS: 0
SPEECH DIFFICULTY: 0
ABDOMINAL PAIN: 0
SLEEP DISTURBANCE: 1
BLOOD IN STOOL: 0

## 2024-12-16 NOTE — PROGRESS NOTES
"Subjective   Patient ID: Tonie Polanco is a 69 y.o. female who presents for Pre-op Exam.    Patient states having surgery in her right shoulder on 01/14/2025. Patient is present for pre-op exam. Patient states her surgery was on 12/06/2024 but have to cancel. Patient states they told her that she does not need EKG or XR chest.    Patient denies any symptoms or concerns today.         Review of Systems   Constitutional: Negative.  Negative for activity change, appetite change, fatigue and fever.   HENT:  Negative for congestion, dental problem, ear discharge, ear pain, mouth sores, rhinorrhea, sinus pressure, sinus pain, sore throat, tinnitus and trouble swallowing.    Eyes:  Negative for photophobia, pain and visual disturbance.   Respiratory:  Negative for cough, chest tightness, shortness of breath and stridor.    Cardiovascular:  Negative for chest pain and palpitations.   Gastrointestinal:  Negative for abdominal distention, abdominal pain, blood in stool, constipation, diarrhea and rectal pain.   Endocrine: Negative for cold intolerance, heat intolerance, polydipsia, polyphagia and polyuria.   Genitourinary:  Negative for dysuria, flank pain, hematuria and urgency.   Musculoskeletal:  Positive for arthralgias. Negative for gait problem, myalgias and neck stiffness.   Skin:  Negative for color change and rash.   Allergic/Immunologic: Negative for environmental allergies and food allergies.   Neurological:  Negative for dizziness, seizures, syncope, speech difficulty and headaches.   Hematological:  Negative for adenopathy.   Psychiatric/Behavioral:  Positive for sleep disturbance. Negative for agitation, confusion, decreased concentration and dysphoric mood. The patient is nervous/anxious.        Objective   /80 (BP Location: Left arm, Patient Position: Sitting, BP Cuff Size: Adult)   Pulse 67   Temp 36.7 °C (98 °F) (Skin)   Resp 18   Ht 1.626 m (5' 4\")   Wt 56.2 kg (124 lb)   SpO2 97%   BMI 21.28 " kg/m²     Physical Exam  Vitals reviewed.   Constitutional:       General: She is not in acute distress.     Appearance: Normal appearance. She is normal weight. She is not ill-appearing or diaphoretic.   HENT:      Head: Normocephalic.      Right Ear: Tympanic membrane and external ear normal.      Left Ear: Tympanic membrane and external ear normal.      Nose: Nose normal. No congestion.      Mouth/Throat:      Pharynx: No posterior oropharyngeal erythema.   Eyes:      General:         Right eye: No discharge.         Left eye: No discharge.      Extraocular Movements: Extraocular movements intact.      Conjunctiva/sclera: Conjunctivae normal.      Pupils: Pupils are equal, round, and reactive to light.   Cardiovascular:      Rate and Rhythm: Normal rate and regular rhythm.      Pulses: Normal pulses.      Heart sounds: Normal heart sounds. No murmur heard.  Pulmonary:      Effort: Pulmonary effort is normal. No respiratory distress.      Breath sounds: Normal breath sounds. No wheezing or rales.   Chest:      Chest wall: No tenderness.   Abdominal:      General: Abdomen is flat. Bowel sounds are normal. There is no distension.      Palpations: There is no mass.      Tenderness: There is no abdominal tenderness. There is no guarding.   Musculoskeletal:         General: Tenderness present. Normal range of motion.        Arms:       Cervical back: Normal range of motion and neck supple. No tenderness.      Right lower leg: No edema.      Left lower leg: No edema.      Comments: Tenderness over the right shoulder with marked diminished range of motion limited to 90 degrees abduction   Skin:     General: Skin is dry.      Coloration: Skin is not jaundiced.      Findings: No bruising, erythema or rash.   Neurological:      General: No focal deficit present.      Mental Status: She is alert and oriented to person, place, and time. Mental status is at baseline.      Cranial Nerves: No cranial nerve deficit.      Sensory:  No sensory deficit.      Coordination: Coordination normal.      Gait: Gait normal.   Psychiatric:         Thought Content: Thought content normal.         Judgment: Judgment normal.      Comments: Anxious         Assessment/Plan   Problem List Items Addressed This Visit             ICD-10-CM    Degenerative arthritis of lumbar spine M47.816    Generalized anxiety disorder F41.1    HTN (hypertension) I10    Osteoporosis M81.0     Other Visit Diagnoses         Codes    Pre-op exam    -  Primary Z01.818    Relevant Orders    Staphylococcus aureus/MRSA colonization, Culture    Primary osteoarthritis of right shoulder     M19.011          PATIENT IS MEDICALLY CLEARED FOR ORTHOPEDIC SURGERY ON TUESDAY, JANUARY 14, 2025.    Provider Attestation - Scribe documentation    All medical record entries made by the Scribe were at my direction and personally dictated by me. I have reviewed the chart and agree that the record accurately reflects my personal performance of the history, physical exam, discussion and plan.

## 2024-12-16 NOTE — PATIENT INSTRUCTIONS
Follow up in 3 weeks    Continue current medications and therapy for chronic medical conditions.    Patient was advised importance of proper diet/nutrition in addition adequate hydration. Patient was encouraged moderate exercise program to include 30 minutes daily for 5 days of the week or 150 minutes weekly. Patient will follow-up with us as scheduled.    I personally reviewed the OARRS report for this patient. I have considered the risks of abuse, dependence, addiction, and diversion.    UDS/CSA:    Obtain nasal swab for MRSA    Obtain preop labs to include CBC, CMP and coag studies in addition to UA    Obtain chest x-ray chest/    Patient will complete most preop orders on December 30

## 2024-12-20 ENCOUNTER — TELEPHONE (OUTPATIENT)
Dept: ORTHOPEDIC SURGERY | Facility: CLINIC | Age: 69
End: 2024-12-20
Payer: MEDICARE

## 2024-12-20 ENCOUNTER — CLINICAL SUPPORT (OUTPATIENT)
Dept: PRIMARY CARE | Facility: CLINIC | Age: 69
End: 2024-12-20
Payer: MEDICARE

## 2024-12-20 DIAGNOSIS — M47.16 OSTEOARTHRITIS OF LUMBAR SPINE WITH MYELOPATHY: ICD-10-CM

## 2024-12-20 DIAGNOSIS — J30.1 SEASONAL ALLERGIC RHINITIS DUE TO POLLEN: ICD-10-CM

## 2024-12-20 RX ORDER — TRIAMCINOLONE ACETONIDE 40 MG/ML
60 INJECTION, SUSPENSION INTRA-ARTICULAR; INTRAMUSCULAR ONCE
Status: SHIPPED | OUTPATIENT
Start: 2024-12-20

## 2024-12-20 NOTE — TELEPHONE ENCOUNTER
Patient LVM wanting to know if she is still allowed to get her allergy shot even though she is ball;posed to have a shoulder scope surgery on 01/14/25. Please advise

## 2024-12-20 NOTE — PROGRESS NOTES
Subjective   Patient ID: Tonie Polanco is a 69 y.o. female who presents for injection.    HPI Pt presents today for a kenalog injection.Pt tolerated injection well.    Review of Systems    Objective   There were no vitals taken for this visit.    Physical Exam    Assessment/Plan

## 2024-12-23 ENCOUNTER — HOSPITAL ENCOUNTER (OUTPATIENT)
Dept: RADIOLOGY | Facility: HOSPITAL | Age: 69
Discharge: HOME | End: 2024-12-23
Payer: MEDICARE

## 2024-12-23 DIAGNOSIS — Z12.31 ENCOUNTER FOR SCREENING MAMMOGRAM FOR BREAST CANCER: ICD-10-CM

## 2024-12-23 PROCEDURE — 77063 BREAST TOMOSYNTHESIS BI: CPT | Performed by: RADIOLOGY

## 2024-12-23 PROCEDURE — 77067 SCR MAMMO BI INCL CAD: CPT

## 2024-12-23 PROCEDURE — 77067 SCR MAMMO BI INCL CAD: CPT | Performed by: RADIOLOGY

## 2024-12-30 ENCOUNTER — LAB (OUTPATIENT)
Dept: LAB | Facility: LAB | Age: 69
End: 2024-12-30
Payer: MEDICARE

## 2024-12-30 ENCOUNTER — HOSPITAL ENCOUNTER (OUTPATIENT)
Dept: RADIOLOGY | Facility: CLINIC | Age: 69
Discharge: HOME | End: 2024-12-30
Payer: MEDICARE

## 2024-12-30 ENCOUNTER — CLINICAL SUPPORT (OUTPATIENT)
Dept: PRIMARY CARE | Facility: CLINIC | Age: 69
End: 2024-12-30
Payer: MEDICARE

## 2024-12-30 DIAGNOSIS — Z01.818 PRE-OP TESTING: ICD-10-CM

## 2024-12-30 DIAGNOSIS — E78.5 DYSLIPIDEMIA: ICD-10-CM

## 2024-12-30 DIAGNOSIS — R79.1 ABNORMAL COAGULATION PROFILE: ICD-10-CM

## 2024-12-30 DIAGNOSIS — Z01.818 PRE-OP EXAM: ICD-10-CM

## 2024-12-30 DIAGNOSIS — D50.8 IRON DEFICIENCY ANEMIA SECONDARY TO INADEQUATE DIETARY IRON INTAKE: ICD-10-CM

## 2024-12-30 DIAGNOSIS — Z01.818 ENCOUNTER FOR PRE-OPERATIVE EXAMINATION: ICD-10-CM

## 2024-12-30 LAB
ALBUMIN SERPL BCP-MCNC: 4.1 G/DL (ref 3.4–5)
ALP SERPL-CCNC: 72 U/L (ref 33–136)
ALT SERPL W P-5'-P-CCNC: 19 U/L (ref 7–45)
ANION GAP SERPL CALC-SCNC: 9 MMOL/L (ref 10–20)
APPEARANCE UR: CLEAR
APTT PPP: 33 SECONDS (ref 27–38)
AST SERPL W P-5'-P-CCNC: 25 U/L (ref 9–39)
BASOPHILS # BLD AUTO: 0.06 X10*3/UL (ref 0–0.1)
BASOPHILS NFR BLD AUTO: 1 %
BILIRUB SERPL-MCNC: 0.6 MG/DL (ref 0–1.2)
BILIRUB UR STRIP.AUTO-MCNC: NEGATIVE MG/DL
BUN SERPL-MCNC: 10 MG/DL (ref 6–23)
CALCIUM SERPL-MCNC: 9.7 MG/DL (ref 8.6–10.3)
CHLORIDE SERPL-SCNC: 104 MMOL/L (ref 98–107)
CHOLEST SERPL-MCNC: 184 MG/DL (ref 0–199)
CHOLESTEROL/HDL RATIO: 2.6
CO2 SERPL-SCNC: 32 MMOL/L (ref 21–32)
COLOR UR: YELLOW
CREAT SERPL-MCNC: 0.86 MG/DL (ref 0.5–1.05)
EGFRCR SERPLBLD CKD-EPI 2021: 73 ML/MIN/1.73M*2
EOSINOPHIL # BLD AUTO: 0.23 X10*3/UL (ref 0–0.7)
EOSINOPHIL NFR BLD AUTO: 3.9 %
ERYTHROCYTE [DISTWIDTH] IN BLOOD BY AUTOMATED COUNT: 13 % (ref 11.5–14.5)
GLUCOSE SERPL-MCNC: 80 MG/DL (ref 74–99)
GLUCOSE UR STRIP.AUTO-MCNC: NORMAL MG/DL
HCT VFR BLD AUTO: 44.6 % (ref 36–46)
HDLC SERPL-MCNC: 70.8 MG/DL
HGB BLD-MCNC: 14.5 G/DL (ref 12–16)
IMM GRANULOCYTES # BLD AUTO: 0.02 X10*3/UL (ref 0–0.7)
IMM GRANULOCYTES NFR BLD AUTO: 0.3 % (ref 0–0.9)
INR PPP: 1.1 (ref 0.9–1.1)
KETONES UR STRIP.AUTO-MCNC: NEGATIVE MG/DL
LDLC SERPL CALC-MCNC: 90 MG/DL
LEUKOCYTE ESTERASE UR QL STRIP.AUTO: NEGATIVE
LYMPHOCYTES # BLD AUTO: 2.03 X10*3/UL (ref 1.2–4.8)
LYMPHOCYTES NFR BLD AUTO: 34.2 %
MCH RBC QN AUTO: 31.6 PG (ref 26–34)
MCHC RBC AUTO-ENTMCNC: 32.5 G/DL (ref 32–36)
MCV RBC AUTO: 97 FL (ref 80–100)
MONOCYTES # BLD AUTO: 0.5 X10*3/UL (ref 0.1–1)
MONOCYTES NFR BLD AUTO: 8.4 %
NEUTROPHILS # BLD AUTO: 3.1 X10*3/UL (ref 1.2–7.7)
NEUTROPHILS NFR BLD AUTO: 52.2 %
NITRITE UR QL STRIP.AUTO: NEGATIVE
NON HDL CHOLESTEROL: 113 MG/DL (ref 0–149)
NRBC BLD-RTO: 0 /100 WBCS (ref 0–0)
PH UR STRIP.AUTO: 6.5 [PH]
PLATELET # BLD AUTO: 298 X10*3/UL (ref 150–450)
POTASSIUM SERPL-SCNC: 4.1 MMOL/L (ref 3.5–5.3)
PROT SERPL-MCNC: 6.7 G/DL (ref 6.4–8.2)
PROT UR STRIP.AUTO-MCNC: NEGATIVE MG/DL
PROTHROMBIN TIME: 12 SECONDS (ref 9.8–12.8)
RBC # BLD AUTO: 4.59 X10*6/UL (ref 4–5.2)
RBC # UR STRIP.AUTO: NEGATIVE /UL
SODIUM SERPL-SCNC: 141 MMOL/L (ref 136–145)
SP GR UR STRIP.AUTO: 1.02
TRIGL SERPL-MCNC: 117 MG/DL (ref 0–149)
UROBILINOGEN UR STRIP.AUTO-MCNC: NORMAL MG/DL
VLDL: 23 MG/DL (ref 0–40)
WBC # BLD AUTO: 5.9 X10*3/UL (ref 4.4–11.3)

## 2024-12-30 PROCEDURE — 71046 X-RAY EXAM CHEST 2 VIEWS: CPT

## 2024-12-30 PROCEDURE — 80061 LIPID PANEL: CPT

## 2024-12-30 PROCEDURE — 87081 CULTURE SCREEN ONLY: CPT

## 2024-12-30 PROCEDURE — 85730 THROMBOPLASTIN TIME PARTIAL: CPT

## 2024-12-30 PROCEDURE — 81003 URINALYSIS AUTO W/O SCOPE: CPT

## 2024-12-30 PROCEDURE — 85025 COMPLETE CBC W/AUTO DIFF WBC: CPT

## 2024-12-30 PROCEDURE — 80053 COMPREHEN METABOLIC PANEL: CPT

## 2024-12-30 PROCEDURE — 85610 PROTHROMBIN TIME: CPT

## 2024-12-30 NOTE — LETTER
January 2, 2025     Tonie Polanco  [address] [place] [address] [address]  [place] [address]      Dear Ms. Polanco:    Below are the results from your recent visit:    LAB RESULTS ARE NORMAL     Resulted Orders   Staphylococcus aureus/MRSA colonization, Culture   Result Value Ref Range    Staph/MRSA Screen Culture No Staphylococcus aureus isolated      The test results show that your current treatment is working. Please continue your current medication and plan.     If you have any questions or concerns, please don't hesitate to call.         Sincerely,        NAKUL ECHEVERRIA, DO

## 2025-01-01 LAB — STAPHYLOCOCCUS SPEC CULT: NORMAL

## 2025-01-09 ENCOUNTER — APPOINTMENT (OUTPATIENT)
Dept: ORTHOPEDIC SURGERY | Facility: CLINIC | Age: 70
End: 2025-01-09
Payer: MEDICARE

## 2025-01-09 ENCOUNTER — OFFICE VISIT (OUTPATIENT)
Dept: ORTHOPEDIC SURGERY | Facility: CLINIC | Age: 70
End: 2025-01-09
Payer: MEDICARE

## 2025-01-09 DIAGNOSIS — G89.18 ACUTE POSTOPERATIVE PAIN: Primary | ICD-10-CM

## 2025-01-09 DIAGNOSIS — M75.101 TEAR OF RIGHT ROTATOR CUFF, UNSPECIFIED TEAR EXTENT, UNSPECIFIED WHETHER TRAUMATIC: ICD-10-CM

## 2025-01-09 PROCEDURE — 1123F ACP DISCUSS/DSCN MKR DOCD: CPT | Performed by: PHYSICIAN ASSISTANT

## 2025-01-09 PROCEDURE — 99024 POSTOP FOLLOW-UP VISIT: CPT | Performed by: PHYSICIAN ASSISTANT

## 2025-01-09 PROCEDURE — 99211 OFF/OP EST MAY X REQ PHY/QHP: CPT | Performed by: PHYSICIAN ASSISTANT

## 2025-01-09 RX ORDER — OXYCODONE AND ACETAMINOPHEN 5; 325 MG/1; MG/1
1 TABLET ORAL EVERY 6 HOURS PRN
Qty: 21 TABLET | Refills: 0 | Status: SHIPPED | OUTPATIENT
Start: 2025-01-09 | End: 2025-01-16

## 2025-01-09 NOTE — PROGRESS NOTES
History and Physical      CHIEF COMPLAINT: Right shoulder pain and weakness    HISTORY OF PRESENT ILLNESS:      The patient is a69 y.o. female with significant past medical history of right shoulder pain and weakness.  Diagnostic studies show impingement, rotator cuff tear and long head bicep tear.  Conservative therapy is not providing relief.  After risk benefits alternatives were discussed patient likes to proceed with right shoulder arthroscopy.      Past Medical History:  Past Medical History:   Diagnosis Date    Acute recurrent maxillary sinusitis 06/24/2022    Acute recurrent maxillary sinusitis    Acute recurrent maxillary sinusitis 09/29/2020    Acute recurrent maxillary sinusitis    Bitten or stung by nonvenomous insect and other nonvenomous arthropods, initial encounter 09/22/2019    Bug bite with infection, initial encounter    Candidiasis, unspecified     Yeast infection    Candidiasis, unspecified     Yeast infection    Cellulitis of right finger 09/25/2019    Cellulitis of right middle finger    Encounter for general adult medical examination without abnormal findings 10/26/2020    Encounter for Medicare annual wellness exam    Encounter for other preprocedural examination 01/17/2020    Pre-op evaluation    Encounter for other screening for malignant neoplasm of breast 10/05/2020    Breast cancer screening    Epidermal cyst 04/13/2021    Inclusion cyst    Flushing 06/20/2019    Vasomotor flushing    Generalized anxiety disorder 08/17/2019    Generalized anxiety disorder    Impacted cerumen, bilateral     Bilateral impacted cerumen    Laceration without foreign body, right lower leg, initial encounter 10/19/2020    Laceration of leg, right    Otalgia, right ear 07/20/2020    Right ear pain    Other conditions influencing health status 08/04/2022    History of cough    Other specified soft tissue disorders 09/25/2019    Swollen finger    Pain in right foot 01/18/2020    Right foot pain    Pain in  right hand 10/14/2019    Pain of right hand    Personal history of diseases of the skin and subcutaneous tissue 10/19/2020    History of cellulitis    Personal history of diseases of the skin and subcutaneous tissue 02/02/2021    History of seborrheic keratosis    Personal history of other benign neoplasm 02/02/2021    History of hemangioma    Personal history of other diseases of the musculoskeletal system and connective tissue 01/04/2022    History of joint pain    Personal history of other diseases of the nervous system and sense organs 08/19/2021    History of ear pain    Personal history of other diseases of the respiratory system 08/03/2022    History of acute bronchitis    Personal history of other diseases of the respiratory system 08/03/2022    History of paranasal sinus congestion    Personal history of other diseases of the respiratory system 08/03/2022    History of sinusitis    Personal history of other drug therapy 09/27/2019    History of influenza vaccination    Personal history of other medical treatment 10/07/2019    History of screening mammography    Personal history of other specified conditions 01/17/2020    History of urinary frequency    Personal history of other specified conditions 01/07/2020    History of urinary urgency    Personal history of other specified conditions 01/07/2020    History of dysuria    Personal history of urinary (tract) infections 12/03/2020    History of urinary tract infection    Presence of unspecified artificial hip joint 05/26/2021    Status post THR (total hip replacement)    Primary insomnia 06/20/2019    Primary insomnia    Unspecified otitis externa, unspecified ear 01/04/2022    Otitis externa    Unspecified otitis externa, unspecified ear 01/04/2022    Otitis externa        Past Surgical History:    Past Surgical History:   Procedure Laterality Date    OTHER SURGICAL HISTORY  09/14/2021    Joint replacement procedure    OTHER SURGICAL HISTORY  09/14/2021     Back surgery       Medications Prior to Admission:    Current Outpatient Medications on File Prior to Visit   Medication Sig Dispense Refill    atenolol (Tenormin) 25 mg tablet Take 1 tablet (25 mg) by mouth once daily. 90 tablet 1    azelastine (Astelin) 137 mcg (0.1 %) nasal spray Administer 1 spray into each nostril 2 times a day. Use in each nostril as directed 30 mL 12    budesonide EC (Entocort EC) 3 mg 24 hr capsule TAKE 3 CAPSULES BY MOUTH EVERY DAY for 6 (SIX) weeks then 2 (TWO) CAPSULES DAILY for 2 (TWO) weeks      denosumab (Prolia) 60 mg/mL syringe Inject 1 mL (60 mg total) under the skin every 6 months. 1 each 1    fluticasone (Flonase Sensimist) 27.5 mcg/actuation nasal spray Administer 1 spray into each nostril once daily. 10 g 5    simvastatin (Zocor) 10 mg tablet TAKE 1 TABLET BY MOUTH AT BEDTIME 90 tablet 1     No current facility-administered medications on file prior to visit.        Allergies:  Doxycycline, Levofloxacin, and Metronidazole    Social History:   Social History     Socioeconomic History    Marital status:      Spouse name: Not on file    Number of children: Not on file    Years of education: Not on file    Highest education level: Not on file   Occupational History    Not on file   Tobacco Use    Smoking status: Never     Passive exposure: Never    Smokeless tobacco: Never   Vaping Use    Vaping status: Never Used   Substance and Sexual Activity    Alcohol use: Not Currently    Drug use: Never    Sexual activity: Defer   Other Topics Concern    Not on file   Social History Narrative    Not on file     Social Drivers of Health     Financial Resource Strain: Not on file   Food Insecurity: Not on file   Transportation Needs: Not on file   Physical Activity: Not on file   Stress: Not on file   Social Connections: Not on file   Intimate Partner Violence: Not on file   Housing Stability: Not on file       Family History:   No family history on file.     Review of systems:  Noncontributory for orthopedics    Vitals:  There were no vitals taken for this visit.    Physical examination:  Head normocephalic atraumatic  Heart regular rate and rhythm  Lungs clear  Abdominal exam nontender nondistended  Extremity: Right shoulder patient has forward flexion to 120 degrees abduction of 50 degrees external rotation 45 degrees internal rotation L4-5    Impression : Right shoulder impingement, rotator cuff tear and long head bicep tear      Plan:  Scheduled for right shoulder arthroscopy subacromial decompression rotator cuff repair long head bicep debridement versus tenodesis    Risk and benefits of surgery discussed extensively with the patient.    Surgical risk included but were not limited to infection, wear, loosening, need for further surgery blood clot, failure to heal, failure of the surgery, stiffness, loss of limb life, extremity function change in length change, and associated risks of surgery during the coronavirus epidemic.    Risk with any surgery including arthroplasty and replacements include but are not limited to:       Wear, loosening, infection, blood clot, DVT, loss of limb, life, delayed recovery, limb length change, instability, dislocation, discomfort with new implant and failure of the procedure.

## 2025-01-10 ENCOUNTER — TELEPHONE (OUTPATIENT)
Dept: PRIMARY CARE | Facility: CLINIC | Age: 70
End: 2025-01-10

## 2025-01-10 NOTE — TELEPHONE ENCOUNTER
Dr. Batista patient    Dr. Galo's office is calling to have LUIS ANTONIO amend his office note from 12/16 to say that patient is cleared for surgery.  Her surgery is Tuesday.    Please advise, thank you.

## 2025-01-14 ENCOUNTER — OUTSIDE PROCEDURE (OUTPATIENT)
Dept: ORTHOPEDIC SURGERY | Facility: CLINIC | Age: 70
End: 2025-01-14
Payer: MEDICARE

## 2025-01-14 PROCEDURE — 29826 SHO ARTHRS SRG DECOMPRESSION: CPT | Performed by: PHYSICIAN ASSISTANT

## 2025-01-14 PROCEDURE — 29827 SHO ARTHRS SRG RT8TR CUF RPR: CPT | Performed by: PHYSICIAN ASSISTANT

## 2025-01-14 PROCEDURE — 29826 SHO ARTHRS SRG DECOMPRESSION: CPT | Performed by: ORTHOPAEDIC SURGERY

## 2025-01-14 PROCEDURE — 29827 SHO ARTHRS SRG RT8TR CUF RPR: CPT | Performed by: ORTHOPAEDIC SURGERY

## 2025-01-14 PROCEDURE — 29823 SHO ARTHRS SRG XTNSV DBRDMT: CPT | Performed by: ORTHOPAEDIC SURGERY

## 2025-01-14 PROCEDURE — 29823 SHO ARTHRS SRG XTNSV DBRDMT: CPT | Performed by: PHYSICIAN ASSISTANT

## 2025-01-14 NOTE — PROGRESS NOTES
Preop diagnosis: Right shoulder rotator cuff tear, labral tear, long head bicep tear    Postop diagnosis:    Right shoulder full-thickness supra and infraspinatus rotator cuff tear  Right shoulder partial undersurface subscapularis tear  Right shoulder osteoarthritic change glenohumeral joint  Right shoulder long head bicep tear  Right shoulder labral tear  Right shoulder impingement    Procedure:    Right shoulder arthroscopic extensive debridement of labral tear, long head bicep tendon tear, osteoarthritic change of the glenohumeral joint, partial undersurface subscapularis [id number]    Right shoulder arthroscopic subacromial [id number]    Right shoulder arthroscopic rotator cuff [id number]    Surgeon  Alden Galo M.D.    Assistant:    Alden Nye physician assistant (PAC) was required and present throughout the entire case.  Given the nature of the disease process and the procedure, a skilled surgical first assistant was necessary during the entire case.  The assistant was necessary to hold retractors and manipulate extremity during the procedure.  A certified scrub tech was also present at the back table managing instruments with supplies for the surgical case      Anesthesia: General With a scalene block      Blood loss: Minimal    Fluids: Less than 2 L       Indications: Patient with loud massive rotator cuff tear including subscap bicep labrum now for operative repair failing conservative treatment program      Summation of event:    The patient was seen in the preoperative area. The risks, benefits, complications, treatment options, non-operative alternatives, expected recovery and outcomes were discussed with the patient. The possibilities of reaction to medication, pulmonary aspiration, injury to surrounding structures, bleeding, recurrent infection, the need for additional procedures, failure to diagnose a condition, and creating a complication requiring transfusion  or operation were discussed with the patient. The patient concurred with the proposed plan, giving informed consent.  The site of surgery was properly noted/marked if necessary per policy. The patient has been actively warmed in preoperative area. Preoperative antibiotics have been ordered and given within 1 hours of incision.       Procedure Details:   Risks and benefits of surgery discussed extensively with the patient included but not limited to failure procedure stiffness delayed prolonged recovery associated risks of anesthetic re tear need for further surgery loss of limb and life.  Patient brought operating room after induction with anesthesia placed in the lateral position.  Exam under anesthesia showed full range of motion with no instability.      The arm was prepped and draped and placed in shoulder traction in the lateral position.  Standard portals were established from posterior to anterior and with the aid of a switching stick we established an anterior portal.      The humeral head and glenoid were inspected.  There was diffuse grade 2 and 3 changes over the central aspect of the glenoid and humeral head.  This was smoothed and debrided until a stable surface was remaining.  This was photodocumented    The anterior portion of the joint was inspected and there was partial tearing of the subscapularis over diffuse 1 to 2 cm area.  This was smoothed and debrided as well.  The remaining tendon was intact.  This appeared to involve less than 20% thickness of the subscapularis.    The bicep base was  torn and frayed.  The bicep had ruptured but the base of the bicep remained in the joint this was extensively debrided and smoothed and photodocumented.    The labrum was torn circumferentially and was debrided circumferentially till a stable labral rim was left in place.  This also was photo documented.    The posterior cuff was intact.  The axillary pouch was open.  There is no evidence of  adhesions.    There were no obvious loose bodies.       Superiorly we could easily see a full-thickness 1-1/2 cm - 2 cm rotator cuff tear with moderate retraction.  This area was freshened debrided and cleaned and marked with a marker stitch.    The stitch could easily be identified on the bursal side.      The camera was placed in the sub bursal space and a lateral portal was established and we reutilized our anterior portal.  A bursectomy was carried out.  We swept from back to front moving a 13 mm osteophyte on the undersurface of the acromion to complete a smooth decompression.  This allowed for full visualization of the bursal side of the rotator cuff.  It was approximately 1-1/2 cm with retraction of less than 1 cm.      Percutaneously to 4.8 mm bio swivel locks were placed in good bone just at the articular cartilage junction.  Each swivel lock was loaded with fiber tape and Fiber Wire.  Match pairs of mattress style Fiber Wire were placed anteriorly and posteriorly and fiber tape was then placed in a 1-1/2 cm interval spaced out 1 cm from the edge of the rotator cuff. The arm was repositioned and we completed our second row where speed bridge repair.  Crisscross pairs of fiber tape and match pairs of Fiber Wire were placed in each distal anchor for a total of 4 anchors.  We completed the repair from anterior to posterior tensioning the sutures.  This anatomically restored the footprint of the insertion site of the supraspinatus tendon over the greater tuberosity.  Final photo documentation was completed and the bursal space was drained portals were closed.             A compressive dressing was applied and the patient was transferred to recovery room after anesthesia was reversed and the patient was placed in a sling in the supine position

## 2025-01-15 ENCOUNTER — OFFICE VISIT (OUTPATIENT)
Dept: ORTHOPEDIC SURGERY | Facility: CLINIC | Age: 70
End: 2025-01-15
Payer: MEDICARE

## 2025-01-15 DIAGNOSIS — M75.101 TEAR OF RIGHT ROTATOR CUFF, UNSPECIFIED TEAR EXTENT, UNSPECIFIED WHETHER TRAUMATIC: Primary | ICD-10-CM

## 2025-01-15 PROCEDURE — 99024 POSTOP FOLLOW-UP VISIT: CPT | Performed by: PHYSICIAN ASSISTANT

## 2025-01-15 PROCEDURE — 1123F ACP DISCUSS/DSCN MKR DOCD: CPT | Performed by: PHYSICIAN ASSISTANT

## 2025-01-15 PROCEDURE — 99211 OFF/OP EST MAY X REQ PHY/QHP: CPT | Performed by: PHYSICIAN ASSISTANT

## 2025-01-15 NOTE — PROGRESS NOTES
History of present illness patient is status post right shoulder arthroscopy subacromial decompression and massive rotator cuff repair.  Patient presents in sling today.  It needed a slight adjustment.  Bandage was removed this open air.  It is okay to shower.      Physical exam:      General: No acute distress or breathing difficulty or discomfort, pleasant and cooperative with the examination.    Extremities: Right shoulder incisions are currently clean dry and intact.  There is no drainage or evidence of infection      Impression: Status post right shoulder arthroscopy subacromial decompression massive rotator cuff repair    Plan: Patient will remain in the sling for 7 to 8 weeks.  Work on gentle hand wrist and elbow range of motion.  Follow-up for wound check in 2 weeks.  We will get an x-ray outlet view of the right shoulder at that time.

## 2025-01-17 ENCOUNTER — OFFICE VISIT (OUTPATIENT)
Dept: ORTHOPEDIC SURGERY | Facility: CLINIC | Age: 70
End: 2025-01-17
Payer: MEDICARE

## 2025-01-17 DIAGNOSIS — L24.89 IRRITANT CONTACT DERMATITIS DUE TO OTHER AGENTS: Primary | ICD-10-CM

## 2025-01-17 PROCEDURE — 99211 OFF/OP EST MAY X REQ PHY/QHP: CPT | Performed by: INTERNAL MEDICINE

## 2025-01-17 RX ORDER — PREDNISONE 50 MG/1
50 TABLET ORAL DAILY
Qty: 5 TABLET | Refills: 0 | Status: SHIPPED | OUTPATIENT
Start: 2025-01-17 | End: 2025-01-22

## 2025-01-17 RX ORDER — CEPHALEXIN 500 MG/1
500 CAPSULE ORAL 3 TIMES DAILY
Qty: 15 CAPSULE | Refills: 0 | Status: SHIPPED | OUTPATIENT
Start: 2025-01-17 | End: 2025-01-22

## 2025-01-17 NOTE — PROGRESS NOTES
Acute Injury New Patient Visit    CC:   Chief Complaint   Patient presents with    Right Shoulder - Pain       HPI: Tonie is a 69 y.o. female presents today for a rash around incision.  She is postop day 3 status post right shoulder arthroscopy subacromial decompression and massive rotator cuff repair.  No fevers or chills.  No drainage.        Review of Systems   GENERAL: Negative for malaise, significant weight loss, fever  MUSCULOSKELETAL: See HPI  NEURO:  Negative for numbness / tingling     Past Medical History  Past Medical History:   Diagnosis Date    Acute recurrent maxillary sinusitis 06/24/2022    Acute recurrent maxillary sinusitis    Acute recurrent maxillary sinusitis 09/29/2020    Acute recurrent maxillary sinusitis    Bitten or stung by nonvenomous insect and other nonvenomous arthropods, initial encounter 09/22/2019    Bug bite with infection, initial encounter    Candidiasis, unspecified     Yeast infection    Candidiasis, unspecified     Yeast infection    Cellulitis of right finger 09/25/2019    Cellulitis of right middle finger    Encounter for general adult medical examination without abnormal findings 10/26/2020    Encounter for Medicare annual wellness exam    Encounter for other preprocedural examination 01/17/2020    Pre-op evaluation    Encounter for other screening for malignant neoplasm of breast 10/05/2020    Breast cancer screening    Epidermal cyst 04/13/2021    Inclusion cyst    Flushing 06/20/2019    Vasomotor flushing    Generalized anxiety disorder 08/17/2019    Generalized anxiety disorder    Impacted cerumen, bilateral     Bilateral impacted cerumen    Laceration without foreign body, right lower leg, initial encounter 10/19/2020    Laceration of leg, right    Otalgia, right ear 07/20/2020    Right ear pain    Other conditions influencing health status 08/04/2022    History of cough    Other specified soft tissue disorders 09/25/2019    Swollen finger    Pain in right foot  01/18/2020    Right foot pain    Pain in right hand 10/14/2019    Pain of right hand    Personal history of diseases of the skin and subcutaneous tissue 10/19/2020    History of cellulitis    Personal history of diseases of the skin and subcutaneous tissue 02/02/2021    History of seborrheic keratosis    Personal history of other benign neoplasm 02/02/2021    History of hemangioma    Personal history of other diseases of the musculoskeletal system and connective tissue 01/04/2022    History of joint pain    Personal history of other diseases of the nervous system and sense organs 08/19/2021    History of ear pain    Personal history of other diseases of the respiratory system 08/03/2022    History of acute bronchitis    Personal history of other diseases of the respiratory system 08/03/2022    History of paranasal sinus congestion    Personal history of other diseases of the respiratory system 08/03/2022    History of sinusitis    Personal history of other drug therapy 09/27/2019    History of influenza vaccination    Personal history of other medical treatment 10/07/2019    History of screening mammography    Personal history of other specified conditions 01/17/2020    History of urinary frequency    Personal history of other specified conditions 01/07/2020    History of urinary urgency    Personal history of other specified conditions 01/07/2020    History of dysuria    Personal history of urinary (tract) infections 12/03/2020    History of urinary tract infection    Presence of unspecified artificial hip joint 05/26/2021    Status post THR (total hip replacement)    Primary insomnia 06/20/2019    Primary insomnia    Unspecified otitis externa, unspecified ear 01/04/2022    Otitis externa    Unspecified otitis externa, unspecified ear 01/04/2022    Otitis externa       Medication review  Medication Documentation Review Audit       Reviewed by Mathew Batista DO (Physician) on 12/16/24 at 1159      Medication  Order Taking? Sig Documenting Provider Last Dose Status   atenolol (Tenormin) 25 mg tablet [id number] Yes Take 1 tablet (25 mg) by mouth once daily. Mathew Batista DO  Active   azelastine (Astelin) 137 mcg (0.1 %) nasal spray [id number] Yes Administer 1 spray into each nostril 2 times a day. Use in each nostril as directed Mathew Batista DO Taking Active   budesonide EC (Entocort EC) 3 mg 24 hr capsule [id number] Yes TAKE 3 CAPSULES BY MOUTH EVERY DAY for 6 (SIX) weeks then 2 (TWO) CAPSULES DAILY for 2 (TWO) weeks Historical Provider, MD Taking Active   denosumab (Prolia) 60 mg/mL syringe [id number] Yes Inject 1 mL (60 mg total) under the skin every 6 months. Mathew Batista DO Taking Active   fluticasone (Flonase Sensimist) 27.5 mcg/actuation nasal spray [id number] No Administer 1 spray into each nostril once daily. Mathew Batista DO Taking  24 2359   losartan (Cozaar) 100 mg tablet [id number] Yes TAKE 1 TABLET BY MOUTH EVERY DAY Mathew Batista DO Taking Active   methylPREDNISolone (Medrol Dospak) 4 mg tablets [id number] Yes Use as directed by package instructions Cas Medina PA-C Taking Active   nitrofurantoin, macrocrystal-monohydrate, (Macrobid) 100 mg capsule [id number] Yes Take 1 capsule (100 mg) by mouth 2 times a day. Mathew Batista DO Taking Active   simvastatin (Zocor) 10 mg tablet [id number] Yes TAKE 1 TABLET BY MOUTH AT BEDTIME Mathew Batista DO  Active                    Allergies  Allergies   Allergen Reactions    Doxycycline Blurred vision    Levofloxacin Unknown    Metronidazole Unknown       Social History  Social History     Socioeconomic History    Marital status:      Spouse name: Not on file    Number of children: Not on file    Years of education: Not on file    Highest education level: Not on file   Occupational History    Not on file   Tobacco Use    Smoking status: Never     Passive exposure: Never    Smokeless tobacco: Never   Vaping Use    Vaping  status: Never Used   Substance and Sexual Activity    Alcohol use: Not Currently    Drug use: Never    Sexual activity: Defer   Other Topics Concern    Not on file   Social History Narrative    Not on file     Social Drivers of Health     Financial Resource Strain: Not on file   Food Insecurity: Not on file   Transportation Needs: Not on file   Physical Activity: Not on file   Stress: Not on file   Social Connections: Not on file   Intimate Partner Violence: Not on file   Housing Stability: Not on file       Surgical History  Past Surgical History:   Procedure Laterality Date    OTHER SURGICAL HISTORY  09/14/2021    Joint replacement procedure    OTHER SURGICAL HISTORY  09/14/2021    Back surgery       Physical Exam:  GENERAL:  Patient is awake, alert, and oriented to person place and time.  Patient appears well nourished and well kept.  Affect Calm, Not Acutely Distressed.  HEENT:  Normocephalic, Atraumatic, EOMI  CARDIOVASCULAR:  Hemodynamically stable.  RESPIRATORY:  Normal respirations with unlabored breathing.  Extremity: Right shoulder incision is clean, dry and intact.  Erythematous rash along the chest and distal to the incision, with slight vesicle and blistering formation.  No active drainage.  No erythema along the incision.  No clinical signs of infection.      Diagnostics: None  XR chest 2 views  Narrative: Interpreted By:  Lyric Velasco,   STUDY:  XR CHEST 2 VIEWS 12/30/2024 8:19 am      INDICATION:  Preoperative evaluation. History of hypertension.      COMPARISON:  09/22/2024      ACCESSION NUMBER(S):  [id number]      ORDERING CLINICIAN:  NAKUL ECHEVERRIA      TECHNIQUE:  PA and lateral views of the chest were acquired.      FINDINGS:  Cardiac size is normal. No mediastinal or hilar abnormality is seen.  The lungs are clear. No pleural abnormality is demonstrated.      There is thoracic dextroscoliosis.      Impression: No acute cardiopulmonary disease.      Signed by: Lyric eVlasco 12/31/2024 8:43  AM  Dictation workstation:   [id number]      Procedure: None    Assessment: Contact dermatitis    Plan: Tonie presents here for skin complaint secondary to contact dermatitis most likely from the adhesive around the dressing.  There are no clinical signs of infection.  Recommended short course of prednisone 50 mg for 5 days, prophylactic antibiotics with Keflex.  Recommended Benadryl as needed for itching.  She will follow-up with Dr. Galo next week for skin check.    Orders Placed This Encounter    cephalexin (Keflex) 500 mg capsule    predniSONE (Deltasone) 50 mg tablet      At the conclusion of the visit there were no further questions by the patient/family regarding their plan of care.  Patient was instructed to call or return with any issues, questions, or concerns regarding their injury and/or treatment plan described above.     01/17/25 at 3:55 PM - Corinna Pierre MD    Office: [phone]    This note was prepared using voice recognition software.  The details of this note are correct and have been reviewed, and corrected to the best of my ability.  Some grammatical errors may persist related to the Dragon software.

## 2025-01-20 ENCOUNTER — APPOINTMENT (OUTPATIENT)
Dept: ORTHOPEDIC SURGERY | Facility: CLINIC | Age: 70
End: 2025-01-20
Payer: MEDICARE

## 2025-01-20 DIAGNOSIS — M75.101 TEAR OF RIGHT ROTATOR CUFF, UNSPECIFIED TEAR EXTENT, UNSPECIFIED WHETHER TRAUMATIC: ICD-10-CM

## 2025-01-20 DIAGNOSIS — L24.89 IRRITANT CONTACT DERMATITIS DUE TO OTHER AGENTS: Primary | ICD-10-CM

## 2025-01-20 PROCEDURE — 1123F ACP DISCUSS/DSCN MKR DOCD: CPT | Performed by: PHYSICIAN ASSISTANT

## 2025-01-20 PROCEDURE — 99024 POSTOP FOLLOW-UP VISIT: CPT | Performed by: PHYSICIAN ASSISTANT

## 2025-01-20 NOTE — PROGRESS NOTES
History of present illness patient is almost 1 week status post right shoulder arthroscopy she had subacromial decompression rotator cuff repair.  She follow-up postop and we removed her bandages.  She has very thin friable skin.  She went on to have a rash and came to our walk-in clinic.  She was treated by Dr. Beatty.  She has taken a combination of antibiotic, prednisone and Benadryl.  She is happy to report the rash is completely dissipated she just has some remainder ecchymosis which is starting to resolve.      Physical exam:      General: No acute distress or breathing difficulty or discomfort, pleasant and cooperative with the examination.    Extremities: Right shoulder diffuse ecchymosis however no drainage at the incisions no evidence of infection and no raised rash.  She has good hand wrist and elbow range of motion      Impression: Right shoulder status post subacromial decompression and rotator cuff repair    Plan: Patient will remain in the sling when she is up and about.  We discussed hand wrist and elbow range of motion which she may perform.  She no longer needs to take the medications.  She will follow-up as scheduled with x-ray outlet view of the right shoulder and another wound check.

## 2025-01-30 ENCOUNTER — OFFICE VISIT (OUTPATIENT)
Dept: ORTHOPEDIC SURGERY | Facility: CLINIC | Age: 70
End: 2025-01-30
Payer: MEDICARE

## 2025-01-30 ENCOUNTER — HOSPITAL ENCOUNTER (OUTPATIENT)
Dept: RADIOLOGY | Facility: CLINIC | Age: 70
Discharge: HOME | End: 2025-01-30
Payer: MEDICARE

## 2025-01-30 VITALS — BODY MASS INDEX: 21.17 KG/M2 | HEIGHT: 64 IN | WEIGHT: 124 LBS

## 2025-01-30 DIAGNOSIS — M25.511 RIGHT SHOULDER PAIN, UNSPECIFIED CHRONICITY: ICD-10-CM

## 2025-01-30 DIAGNOSIS — M75.101 TEAR OF RIGHT ROTATOR CUFF, UNSPECIFIED TEAR EXTENT, UNSPECIFIED WHETHER TRAUMATIC: Primary | ICD-10-CM

## 2025-01-30 PROCEDURE — 99211 OFF/OP EST MAY X REQ PHY/QHP: CPT | Performed by: ORTHOPAEDIC SURGERY

## 2025-01-30 PROCEDURE — 73020 X-RAY EXAM OF SHOULDER: CPT | Mod: RIGHT SIDE | Performed by: ORTHOPAEDIC SURGERY

## 2025-01-30 PROCEDURE — 1123F ACP DISCUSS/DSCN MKR DOCD: CPT | Performed by: ORTHOPAEDIC SURGERY

## 2025-01-30 PROCEDURE — 99024 POSTOP FOLLOW-UP VISIT: CPT | Performed by: ORTHOPAEDIC SURGERY

## 2025-01-30 PROCEDURE — 73020 X-RAY EXAM OF SHOULDER: CPT | Mod: RT

## 2025-01-30 PROCEDURE — 3008F BODY MASS INDEX DOCD: CPT | Performed by: ORTHOPAEDIC SURGERY

## 2025-01-30 NOTE — PROGRESS NOTES
History of present illness patient is status post right shoulder arthroscopy subacromial decompression rotator cuff repair.  First week she had a lot of issues with the wound areas because she has very sensitive skin.  That all healed up very well.  She has some diffuse ecchymosis that is resolving.  No severe pain.      Physical exam:      General: No acute distress or breathing difficulty or discomfort, pleasant and cooperative with the examination.    Extremities: Right shoulder incisions are clean dry and intact.  Sutures removed new Steri-Strips replacer is no drainage redness or evidence of infection.  Again there is some resolving ecchymosis but her skin is completely intact she can perform hand wrist and elbow range of motion without difficulty      Diagnostic studies: X-ray outlet view taken today of the right shoulder shows smooth subacromial arch no fracture dislocation noted x-ray was read by Dr. Alden Galo    Impression: Status post right shoulder arthroscopy subacromial decompression rotator cuff repair    Plan: Patient will continue to perform hand wrist and elbow range of motion.  She will be in the sling till the beginning of March.  At that time she will start physical therapy.  Physical therapy order was provided today.  Will see her again in approximately 5 to 6 weeks for range of motion check.

## 2025-02-08 ENCOUNTER — OFFICE VISIT (OUTPATIENT)
Dept: PRIMARY CARE | Facility: CLINIC | Age: 70
End: 2025-02-08
Payer: MEDICARE

## 2025-02-08 VITALS
BODY MASS INDEX: 21.37 KG/M2 | RESPIRATION RATE: 16 BRPM | TEMPERATURE: 98 F | HEART RATE: 75 BPM | HEIGHT: 64 IN | WEIGHT: 125.2 LBS | OXYGEN SATURATION: 95 % | DIASTOLIC BLOOD PRESSURE: 80 MMHG | SYSTOLIC BLOOD PRESSURE: 124 MMHG

## 2025-02-08 DIAGNOSIS — J01.00 ACUTE NON-RECURRENT MAXILLARY SINUSITIS: Primary | ICD-10-CM

## 2025-02-08 DIAGNOSIS — R05.9 COUGH IN ADULT PATIENT: ICD-10-CM

## 2025-02-08 DIAGNOSIS — J00 NASOPHARYNGITIS: ICD-10-CM

## 2025-02-08 DIAGNOSIS — R09.81 NASAL CONGESTION WITH RHINORRHEA: ICD-10-CM

## 2025-02-08 DIAGNOSIS — J34.89 NASAL CONGESTION WITH RHINORRHEA: ICD-10-CM

## 2025-02-08 PROCEDURE — 99212 OFFICE O/P EST SF 10 MIN: CPT | Performed by: NURSE PRACTITIONER

## 2025-02-08 RX ORDER — AMOXICILLIN 875 MG/1
875 TABLET, FILM COATED ORAL 2 TIMES DAILY
Qty: 20 TABLET | Refills: 0 | Status: SHIPPED | OUTPATIENT
Start: 2025-02-08 | End: 2025-02-18

## 2025-02-08 ASSESSMENT — ENCOUNTER SYMPTOMS
LOSS OF SENSATION IN FEET: 0
OCCASIONAL FEELINGS OF UNSTEADINESS: 0
DEPRESSION: 0

## 2025-02-08 ASSESSMENT — PAIN SCALES - GENERAL: PAINLEVEL_OUTOF10: 2

## 2025-02-08 NOTE — PROGRESS NOTES
"Subjective   Patient ID: Tonie Polanco is a 69 y.o. female who presents for sinus        Symptoms: headaches, sinus pressure, sputum/mucus yellow thick, hoarse, voice,   Length of symptoms: 2 days ago  OTC: none  Related information:    HPI     Review of Systems    Objective   /82   Pulse 75   Temp 36.7 °C (98 °F)   Resp 16   Ht 1.626 m (5' 4\")   Wt 56.8 kg (125 lb 3.2 oz)   SpO2 95%   BMI 21.49 kg/m²     Physical Exam    Assessment/Plan          "

## 2025-02-08 NOTE — PROGRESS NOTES
Subjective   Patient ID: Tonie Polanco is a 69 y.o. female who is with a chief complaint of symptoms of respiratory tract infection.     HPI   Patient is a 69 y.o. female who CONSULTED AT [facility] today. Patient is with complaints of nasal congestion, nasal discharge, headache, maxillary sinus pain, throat irritation, mild hoarseness, post nasal drip, and productive cough. She denies having any fatigue, muscle ache, loss of sense of taste, loss of sense of smell, diarrhea, chills nor fever. Patient states that present condition started about 3 days ago. Patient denies history of recent travel, exposure to person/people who tested positive for COVID 19, nor exposure to person/people with flu like symptoms. she denies shortness of breath, chest pain, palpitations, nor edema. she stated that she  tried OTC medications which afforded only slight relief of symptoms. she denies nausea, vomiting, abdominal pain, nor any other symptoms.    Patient states she had her COVID vaccine.  Patient states she had the flu shot for this season.    Review of Systems  General: no weight loss, generally healthy, no fatigue  Head: (+) headache, (+) maxillary sinus pain, no vertigo, no injury  Eyes: no diplopia, no tearing, no pain,   Ears: no change in hearing, no tinnitus, no bleeding, no vertigo  Mouth:  no dental difficulties, no gingival bleeding, (+) throat irritation, no loss of sense of taste, (+) mild hoarseness, (+) post nasal drip,   Nose: (+) congestion, (+) discharge, no bleeding, no obstruction, no loss of sense of smell  Neck: no stiffness, no pain, no tenderness, no masses, no bruit  Pulmonary: no dyspnea, no wheezing, no hemoptysis, (+) productive cough  Cardiovascular: no chest pain, no palpitations, no syncope, no orthopnea  Gastrointestinal: no change in appetite, no dysphagia, no abdominal pains, no diarrhea, no emesis, no melena  Genito Urinary: no dysuria, no urinary urgency, no nocturia,  no incontinence, no change in nature of urine  Musculoskeletal: no muscle ache, no joint pain, no limitation of range of motion, no paresthesia, no numbness  Constitutional: no fever, no chills, no night sweats    Objective   Physical Exam  General: ambulatory, in no acute distress  Head: normocephalic, no lesions, (+) maxillary sinus tenderness  Eyes: pink palpebral conjunctiva, anicteric sclerae, PERRLA, EOM's full  Ears: clear external auditory canals, no ear discharge, no bleeding from the ears, tympanic membrane intact  Nose: (+) congested nasal mucosa, (+) yellow mucoid nasal discharge, no bleeding, no obstruction  Throat: (+) erythema, and (+) exudate on posterior pharyngeal wall, no lesion  Neck: supple, no masses, no bruits, no CLADP  Chest: symmetrical chest expansion, no lagging, no retractions, clear breath sounds, no rales, no wheezes    Assessment/Plan   Problem List Items Addressed This Visit    None  Visit Diagnoses         Codes    Acute non-recurrent maxillary sinusitis    -  Primary J01.00    Relevant Medications    amoxicillin (Amoxil) 875 mg tablet    Nasal congestion with rhinorrhea     R09.81, J34.89    Relevant Medications    amoxicillin (Amoxil) 875 mg tablet    Nasopharyngitis     J00    Relevant Medications    amoxicillin (Amoxil) 875 mg tablet    Cough in adult patient     R05.9    Relevant Medications    amoxicillin (Amoxil) 875 mg tablet        DISCHARGE SUMMARY:   Patient was seen and examined. Diagnosis, treatment, treatment options, and possible complications of today's illness discussed and explained to patient. Patient to take medication/s associated with this visit. Patient may take OTC decongestant of choice as needed. Patient may also take OTC analgesic/ antipyretic if needed for pain/fever. Advised to increase oral fluid intake. Advised steam inhalation if needed to relieve congestion. Advised warm saline gargle if needed to relieve throat discomfort. Advised Listerine  antiseptic mouthwash gargle TID. Patient may use Cepacol oral spray as needed to relieve throat discomfort. Patient was advised to discard the old toothbrush and use a new toothbrush beginning on the third of antibiotics. Advised to come back if with worsening or persistent symptoms. Patient verbalized understanding of plan of care.    Patient to come back in 7 - 10 days if needed for worsening symptoms.

## 2025-02-11 ENCOUNTER — TELEPHONE (OUTPATIENT)
Dept: PRIMARY CARE | Facility: CLINIC | Age: 70
End: 2025-02-11
Payer: MEDICARE

## 2025-02-11 DIAGNOSIS — E78.5 DYSLIPIDEMIA: ICD-10-CM

## 2025-02-11 NOTE — TELEPHONE ENCOUNTER
Pt would like lab orders put in so she can have them done before her appointment.    Please call when ready

## 2025-03-01 DIAGNOSIS — J01.01 ACUTE RECURRENT MAXILLARY SINUSITIS: ICD-10-CM

## 2025-03-02 DIAGNOSIS — E78.5 HYPERLIPIDEMIA, UNSPECIFIED: ICD-10-CM

## 2025-03-03 RX ORDER — ATENOLOL 25 MG/1
25 TABLET ORAL DAILY
Qty: 90 TABLET | Refills: 1 | Status: SHIPPED | OUTPATIENT
Start: 2025-03-03 | End: 2025-08-30

## 2025-03-03 RX ORDER — SIMVASTATIN 10 MG/1
TABLET, FILM COATED ORAL
Qty: 90 TABLET | Refills: 1 | Status: SHIPPED | OUTPATIENT
Start: 2025-03-03

## 2025-03-03 NOTE — TELEPHONE ENCOUNTER
Recent Visits  Date Type Provider Dept   12/30/24 Clinical Support Arely Heath MA [facility]   12/20/24 Clinical Support Lisbet Bueno MA [facility]   12/16/24 Office Visit Mathew Batista, DO [facility]   11/26/24 Office Visit Mathew Batista, DO [facility]   11/04/24 Office Visit Vimal Roblero, DO [facility]   06/13/24 Clinical Support Jasmyne Price [facility]   03/21/24 Office Visit Mathew Batista, DO [facility]   Showing recent visits within past 365 days and meeting all other requirements  Future Appointments  Date Type Provider Dept   04/01/25 Appointment Mathew Batista, DO [facility]   Showing future appointments within next 90 days and meeting all other requirements

## 2025-03-06 ENCOUNTER — OFFICE VISIT (OUTPATIENT)
Dept: PRIMARY CARE | Facility: CLINIC | Age: 70
End: 2025-03-06
Payer: MEDICARE

## 2025-03-06 VITALS
BODY MASS INDEX: 21.68 KG/M2 | HEART RATE: 63 BPM | TEMPERATURE: 97 F | WEIGHT: 127 LBS | HEIGHT: 64 IN | DIASTOLIC BLOOD PRESSURE: 80 MMHG | SYSTOLIC BLOOD PRESSURE: 112 MMHG | RESPIRATION RATE: 16 BRPM | OXYGEN SATURATION: 99 %

## 2025-03-06 DIAGNOSIS — J00 NASOPHARYNGITIS: ICD-10-CM

## 2025-03-06 DIAGNOSIS — J34.89 NASAL CONGESTION WITH RHINORRHEA: ICD-10-CM

## 2025-03-06 DIAGNOSIS — R09.81 NASAL CONGESTION WITH RHINORRHEA: ICD-10-CM

## 2025-03-06 DIAGNOSIS — J01.00 ACUTE NON-RECURRENT MAXILLARY SINUSITIS: Primary | ICD-10-CM

## 2025-03-06 PROCEDURE — 99212 OFFICE O/P EST SF 10 MIN: CPT | Performed by: NURSE PRACTITIONER

## 2025-03-06 PROCEDURE — 3079F DIAST BP 80-89 MM HG: CPT | Performed by: NURSE PRACTITIONER

## 2025-03-06 PROCEDURE — 1159F MED LIST DOCD IN RCRD: CPT | Performed by: NURSE PRACTITIONER

## 2025-03-06 PROCEDURE — 1160F RVW MEDS BY RX/DR IN RCRD: CPT | Performed by: NURSE PRACTITIONER

## 2025-03-06 PROCEDURE — 1158F ADVNC CARE PLAN TLK DOCD: CPT | Performed by: NURSE PRACTITIONER

## 2025-03-06 PROCEDURE — 3008F BODY MASS INDEX DOCD: CPT | Performed by: NURSE PRACTITIONER

## 2025-03-06 PROCEDURE — 1123F ACP DISCUSS/DSCN MKR DOCD: CPT | Performed by: NURSE PRACTITIONER

## 2025-03-06 PROCEDURE — 3074F SYST BP LT 130 MM HG: CPT | Performed by: NURSE PRACTITIONER

## 2025-03-06 PROCEDURE — 1125F AMNT PAIN NOTED PAIN PRSNT: CPT | Performed by: NURSE PRACTITIONER

## 2025-03-06 RX ORDER — CEFDINIR 300 MG/1
300 CAPSULE ORAL 2 TIMES DAILY
Qty: 20 CAPSULE | Refills: 0 | Status: SHIPPED | OUTPATIENT
Start: 2025-03-06 | End: 2025-03-16

## 2025-03-06 RX ORDER — BROMPHENIRAMINE MALEATE, PSEUDOEPHEDRINE HYDROCHLORIDE, AND DEXTROMETHORPHAN HYDROBROMIDE 2; 30; 10 MG/5ML; MG/5ML; MG/5ML
5 SYRUP ORAL 4 TIMES DAILY PRN
Qty: 120 ML | Refills: 1 | Status: SHIPPED | OUTPATIENT
Start: 2025-03-06 | End: 2025-03-16

## 2025-03-06 ASSESSMENT — ENCOUNTER SYMPTOMS
COUGH: 1
SINUS COMPLAINT: 1
HEADACHES: 1
SINUS PRESSURE: 1

## 2025-03-06 ASSESSMENT — PAIN SCALES - GENERAL: PAINLEVEL_OUTOF10: 2

## 2025-03-06 NOTE — PROGRESS NOTES
Subjective   Patient ID: Tonie Polanco is a 69 y.o. female who is with a chief complaint of symptoms of respiratory tract infection.     HPI   Patient is a 69 y.o. female who CONSULTED AT [facility] today. Patient is with complaints of nasal congestion, nasal discharge, headache, maxillary sinus pain (L>R), throat irritation, post nasal drip, productive cough, and fatigue. She denies having any muscle ache, loss of sense of taste, loss of sense of smell, diarrhea, chills nor fever. Patient states that present condition started about 6 days ago after being exposed to some of her grandchildren who are having cough and cold symptoms. she denies shortness of breath, chest pain, palpitations, nor edema. she stated that she  tried OTC medications which afforded only slight relief of symptoms. she denies nausea, vomiting, abdominal pain, nor any other symptoms.    Patient states she had her COVID vaccine.  Patient states she had the flu shot for this season.    Review of Systems  General: no weight loss, generally healthy, (+) fatigue  Head: (+) headache, (+) maxillary sinus pain, no vertigo, no injury  Eyes: no diplopia, no tearing, no pain,   Ears: no change in hearing, no tinnitus, no bleeding, no vertigo  Mouth:  no dental difficulties, no gingival bleeding, (+) throat irritation, no loss of sense of taste, (+) post nasal drip,  Nose: (+) congestion, (+) discharge, no bleeding, no obstruction, no loss of sense of smell  Neck: no stiffness, no pain, no tenderness, no masses, no bruit  Pulmonary: no dyspnea, no wheezing, no hemoptysis, (+) productive cough  Cardiovascular: no chest pain, no palpitations, no syncope, no orthopnea  Gastrointestinal: no change in appetite, no dysphagia, no abdominal pains, no diarrhea, no emesis, no melena  Genito Urinary: no dysuria, no urinary urgency, no nocturia, no incontinence, no change in nature of urine  Musculoskeletal: no muscle ache, no joint pain, no  "limitation of range of motion, no paresthesia, no numbness  Constitutional: no fever, no chills, no night sweats    Objective   /80 (BP Location: Right arm, Patient Position: Sitting, BP Cuff Size: Adult)   Pulse 63   Temp 36.1 °C (97 °F) (Temporal)   Resp 16   Ht 1.626 m (5' 4\")   Wt 57.6 kg (127 lb)   SpO2 99%   BMI 21.80 kg/m²     Physical Exam  General: ambulatory, in no acute distress  Head: normocephalic, no lesions, (+) maxillary sinus tenderness (L>R),   Eyes: pink palpebral conjunctiva, anicteric sclerae, PERRLA, EOM's full  Ears: clear external auditory canals, no ear discharge, no bleeding from the ears, tympanic membrane intact  Nose: (+) congested nasal mucosa, (+) yellow mucoid nasal discharge, no bleeding, no obstruction  Throat: (+) erythema, and (+) exudate on posterior pharyngeal wall, no lesion  Neck: supple, no masses, no bruits, no CLADP  Chest: symmetrical chest expansion, no lagging, no retractions, clear breath sounds, no rales, no wheezes    Assessment/Plan   Problem List Items Addressed This Visit    None  Visit Diagnoses         Codes    Acute non-recurrent maxillary sinusitis    -  Primary J01.00    Relevant Medications    cefdinir (Omnicef) 300 mg capsule    brompheniramine-pseudoeph-DM 2-30-10 mg/5 mL syrup    Nasopharyngitis     J00    Relevant Medications    cefdinir (Omnicef) 300 mg capsule    brompheniramine-pseudoeph-DM 2-30-10 mg/5 mL syrup    Nasal congestion with rhinorrhea     R09.81, J34.89    Relevant Medications    cefdinir (Omnicef) 300 mg capsule    brompheniramine-pseudoeph-DM 2-30-10 mg/5 mL syrup        DISCHARGE SUMMARY:   Patient was seen and examined. Diagnosis, treatment, treatment options, and possible complications of today's illness discussed and explained to patient. Patient to take medication/s associated with this visit. Patient may also take OTC analgesic/antipyretic if needed for pain/fever. Advised to increase oral fluid intake. Advised steam " inhalation if needed to relieve congestion. Advised warm saline gargle if needed to relieve throat discomfort. Advised Listerine antiseptic mouthwash gargle TID. Patient may use Cepacol oral spray as needed to relieve throat discomfort. Patient was advised to discard the old toothbrush and use a new toothbrush beginning on the third of antibiotics. Advised to come back if with worsening or persistent symptoms. Patient verbalized understanding of plan of care.    Patient to come back in 7 - 10 days if needed for worsening symptoms.

## 2025-03-06 NOTE — PROGRESS NOTES
"Subjective   Patient ID: Tonie Polanco is a 69 y.o. female who presents for Sinus Problem.    Sinus Problem  Associated symptoms include congestion, coughing, headaches and sinus pressure. Treatments tried: cough medicine. The treatment provided no relief.        Review of Systems   HENT:  Positive for congestion and sinus pressure.    Respiratory:  Positive for cough.    Neurological:  Positive for headaches.       Objective   /80 (BP Location: Right arm, Patient Position: Sitting, BP Cuff Size: Adult)   Pulse 63   Temp 36.1 °C (97 °F) (Temporal)   Resp 16   Ht 1.626 m (5' 4\")   Wt 57.6 kg (127 lb)   SpO2 99%   BMI 21.80 kg/m²     Physical Exam    Assessment/Plan          "

## 2025-03-10 ENCOUNTER — OFFICE VISIT (OUTPATIENT)
Dept: ORTHOPEDIC SURGERY | Facility: CLINIC | Age: 70
End: 2025-03-10
Payer: MEDICARE

## 2025-03-10 DIAGNOSIS — M25.511 RIGHT SHOULDER PAIN, UNSPECIFIED CHRONICITY: ICD-10-CM

## 2025-03-10 DIAGNOSIS — M75.101 TEAR OF RIGHT ROTATOR CUFF, UNSPECIFIED TEAR EXTENT, UNSPECIFIED WHETHER TRAUMATIC: Primary | ICD-10-CM

## 2025-03-10 PROCEDURE — 99024 POSTOP FOLLOW-UP VISIT: CPT | Performed by: PHYSICIAN ASSISTANT

## 2025-03-10 PROCEDURE — 1123F ACP DISCUSS/DSCN MKR DOCD: CPT | Performed by: PHYSICIAN ASSISTANT

## 2025-03-10 PROCEDURE — 99211 OFF/OP EST MAY X REQ PHY/QHP: CPT | Performed by: ORTHOPAEDIC SURGERY

## 2025-03-10 NOTE — PROGRESS NOTES
History of present illness patient is 2 months status post massive right shoulder rotator cuff repair.  Patient overall states she is feeling good.  The pain is decreased significantly and she is ready to start physical therapy.      Physical exam:      General: No acute distress or breathing difficulty or discomfort, pleasant and cooperative with the examination.    Extremities: Right shoulder incisions are well-healed and intact with hands together she can forward flex to 140 degrees      Impression: Status post right shoulder massive rotator cuff repair    Plan: Patient no longer needs to wear the sling.  We want her to start with a gentle range of motion physical therapy program.  No resistance for 5 weeks.  She will follow-up in 4 to 5 weeks for range of motion check.

## 2025-03-13 ENCOUNTER — EVALUATION (OUTPATIENT)
Dept: PHYSICAL THERAPY | Facility: CLINIC | Age: 70
End: 2025-03-13
Payer: MEDICARE

## 2025-03-13 DIAGNOSIS — M75.101 TEAR OF RIGHT ROTATOR CUFF, UNSPECIFIED TEAR EXTENT, UNSPECIFIED WHETHER TRAUMATIC: ICD-10-CM

## 2025-03-13 DIAGNOSIS — M25.511 RIGHT SHOULDER PAIN, UNSPECIFIED CHRONICITY: ICD-10-CM

## 2025-03-13 PROCEDURE — 97110 THERAPEUTIC EXERCISES: CPT | Mod: GP

## 2025-03-13 PROCEDURE — 97161 PT EVAL LOW COMPLEX 20 MIN: CPT | Mod: GP

## 2025-03-13 NOTE — PROGRESS NOTES
Physical Therapy Evaluation    Patient Name: Tonie Polanco  MRN: [id number]  Time Calculation  Start Time: 1000  Stop Time: 1035  Time Calculation (min): 35 min  PT Evaluation Time Entry  PT Evaluation (Low) Time Entry: 8  PT Therapeutic Procedures Time Entry  Therapeutic Exercise Time Entry: 17          Current Problem  1. Tear of right rotator cuff, unspecified tear extent, unspecified whether traumatic  Referral to Physical Therapy      2. Right shoulder pain, unspecified chronicity  Referral to Physical Therapy        Insurance    Insurance reviewed   Visit number: 1  Approved number of visits: BMN   MEDICARE/MMO 2410($0 USED) COPAY 0 (MET)  COVERAGE 80/100 OOP 0 MMO TO FOLLOW MEDICARE  NO AUTH REQ [id number]      Subjective   General:  Pt reports to the clinic with complaint of R shoulder pain s/p R shoulder massive RCR and SAD on 1/14/25 with Dr. VANESSA Galo. Sx started on about 3 months before surgery after carrying heavy objects. Pt denies radicular Sx, popping, clicking, or catching. Pt would like to return to work following completion of PT. Pt is R hand dominant. She is able to sleep through the night comfortably. Dr. Galo has cleared her out of the sling. Patient denies any abnormal/calf pain, acute/abnormal lower leg swelling, shortness of breath or any overt signs of DVT/PE. Patient also denies any acute irritation of surgical site, no odorous/abnormally colored discharge or overt signs of infection. Denies any falls. Pt denies any /GI changes, fever/night sweats/pain, loss of appetite, or unintentional weight loss/gain.     Occupation:      Lifestyle: working and watching [employer], walking     Living Environment: lives with family       Precautions:  R RCR Dr. VANESSA Galo 1/14/25- P/A/AROM, no resistance until 4/15/25 per referral  -cleared for AROM strengthening by Paresh Nye via secure chat on 3/13/25- no resistance     Pain:  Description of Symptoms:    Pain: none    Location: R shoulder   Severity: current/best- 0/10  Worst- 0/10  Intensity Variation: none   Duration of Pain: none  Aggravating Factors: none   Relieving Factors: none   Associated Symptoms: none       Reviewed medical screening form with pt and medical screening assessed    Imaging:   Prior to surgery: MRI 10/11/24- Chronic full-thickness tear subscapularis.      Long head biceps tear.      Full-thickness tear infraspinatus and anterior fibers of the  supraspinatus 2 cm in AP dimension.      Acromioclavicular osteoarthritis.      Degenerative labral tearing.    Objective   Shoulder Musculoskeletal Exam    Range of Motion    Right      Passive forward elevation: 150.       Passive abduction: 110.       Right passive external rotation 90 degrees: 95.       Passive internal rotation in abduction: 80.     Left      Left shoulder range of motion is normal.     Range of motion additional comments: R UE active NT d/t protocol     Strength    Left      Left shoulder strength is normal.     Strength additional comments: R UE NT d/t protocol         Outcome Measures:  Other Measures  Disability of Arm Shoulder Hand (DASH): 27     Treatment:   Includes measures for eval     EDUCATION/HEP:  Access Code: [email or web address]  URL: [email or web address]  Date: 03/13/2025  Prepared by: Cathleen Nova    Exercises  - Supine Shoulder Flexion Extension AAROM with Dowel  - 1 x daily - 4 x weekly - 2 sets - 10 reps  - Supine Shoulder Abduction AAROM with Dowel  - 1 x daily - 4 x weekly - 2 sets - 10 reps  - Supine Shoulder Protraction with Dowel  - 1 x daily - 4 x weekly - 2 sets - 10 reps  - Supine Shoulder Press AAROM in Abduction with Dowel  - 1 x daily - 4 x weekly - 2 sets - 10 reps  - Standing Isometric Shoulder Internal Rotation at Doorway  - 1 x daily - 4 x weekly - 10 reps - 10 hold  - Standing Isometric Shoulder External Rotation with Doorway  - 1 x daily - 4 x weekly - 10 reps - 10 hold  - Standing Isometric  Shoulder Flexion with Doorway - Arm Bent  - 1 x daily - 4 x weekly - 10 reps - 10 hold  - Standing Isometric Shoulder Extension with Doorway - Arm Bent  - 1 x daily - 4 x weekly - 10 reps - 10 hold  - Seated Scapular Retraction  - 1 x daily - 4 x weekly - 2 sets - 10 reps    Assessment:  Pt is here for an evaluation of her R shoulder following a R massive RCR and SAD on 1/14/25 with Dr. VANESSA Galo. Pt overall has great progress so far and is out of the sling. AROM and strength of R UE NT d/t protocol. R shoulder PROM nearly full in all directions with slight limitations and guarding in flexion and IR. ER completely full and not painful. Pt able to tolerate AAROM and isometrics today with no increase in pain. She stands to benefit from skilled PT in order to increase R shoulder ROM, strength, and stability in order to return to PLOF.     Clinical Presentation: Stable    Level of Complexity: Low     Goals:  Pt will report at least 75% improvement in R shoulder pain during everyday activities.  Pt will demo >/= 4+/5 strength of R shoulder musculature without pain.  Pt will demo symmetrical A/PROM of shoulder to 175-180 deg without pain.  Pt will improve Quick DASH score by at least 20% (MCID) to indicate a significant improvement in overall function.   Pt will demo independence and report compliance with HEP.      Plan  1x a week for 8 visits     Skilled therapeutic intervention to address the above mentioned physical and functional impairments and limitations including, but not limited to: patient education, therapeutic exercise, therapeutic activity, manual therapy, body mechanics training, dry needling, blood flow restriction training, instrumented soft tissue mobilization, manual soft tissue mobilization, gait retraining, biofeedback, cryotherapy, electrical stimulation, home program development, hot pack, taping, neuromuscular re-education, self-care/home management, and vasopneumatic compression.

## 2025-03-16 ENCOUNTER — APPOINTMENT (OUTPATIENT)
Dept: RADIOLOGY | Facility: HOSPITAL | Age: 70
End: 2025-03-16
Payer: MEDICARE

## 2025-03-16 ENCOUNTER — HOSPITAL ENCOUNTER (OUTPATIENT)
Facility: HOSPITAL | Age: 70
Setting detail: OBSERVATION
End: 2025-03-16
Attending: STUDENT IN AN ORGANIZED HEALTH CARE EDUCATION/TRAINING PROGRAM | Admitting: STUDENT IN AN ORGANIZED HEALTH CARE EDUCATION/TRAINING PROGRAM
Payer: MEDICARE

## 2025-03-16 ENCOUNTER — APPOINTMENT (OUTPATIENT)
Dept: CARDIOLOGY | Facility: HOSPITAL | Age: 70
End: 2025-03-16
Payer: MEDICARE

## 2025-03-16 VITALS
SYSTOLIC BLOOD PRESSURE: 134 MMHG | DIASTOLIC BLOOD PRESSURE: 79 MMHG | HEIGHT: 64 IN | HEART RATE: 66 BPM | OXYGEN SATURATION: 94 % | BODY MASS INDEX: 22.06 KG/M2 | WEIGHT: 129.19 LBS | TEMPERATURE: 98.1 F | RESPIRATION RATE: 18 BRPM

## 2025-03-16 DIAGNOSIS — R07.9 CHEST PAIN, UNSPECIFIED TYPE: Primary | ICD-10-CM

## 2025-03-16 DIAGNOSIS — I10 PRIMARY HYPERTENSION: ICD-10-CM

## 2025-03-16 DIAGNOSIS — I49.40 CARDIAC ARRHYTHMIA DUE TO PREMATURE DEPOLARIZATION, UNSPECIFIED TYPE: ICD-10-CM

## 2025-03-16 DIAGNOSIS — I20.9 ANGINA PECTORIS, UNSPECIFIED: ICD-10-CM

## 2025-03-16 DIAGNOSIS — M81.0 AGE-RELATED OSTEOPOROSIS WITHOUT CURRENT PATHOLOGICAL FRACTURE: ICD-10-CM

## 2025-03-16 LAB
ALBUMIN SERPL BCP-MCNC: 4 G/DL (ref 3.4–5)
ALP SERPL-CCNC: 90 U/L (ref 33–136)
ALT SERPL W P-5'-P-CCNC: 14 U/L (ref 7–45)
ANION GAP SERPL CALC-SCNC: 13 MMOL/L (ref 10–20)
AST SERPL W P-5'-P-CCNC: 25 U/L (ref 9–39)
ATRIAL RATE: 76 BPM
ATRIAL RATE: 78 BPM
BASOPHILS # BLD AUTO: 0.1 X10*3/UL (ref 0–0.1)
BASOPHILS NFR BLD AUTO: 1.3 %
BILIRUB SERPL-MCNC: 0.6 MG/DL (ref 0–1.2)
BNP SERPL-MCNC: 33 PG/ML (ref 0–99)
BUN SERPL-MCNC: 11 MG/DL (ref 6–23)
CALCIUM SERPL-MCNC: 9.3 MG/DL (ref 8.6–10.3)
CARDIAC TROPONIN I PNL SERPL HS: <3 NG/L (ref 0–13)
CARDIAC TROPONIN I PNL SERPL HS: <3 NG/L (ref 0–13)
CHLORIDE SERPL-SCNC: 104 MMOL/L (ref 98–107)
CO2 SERPL-SCNC: 27 MMOL/L (ref 21–32)
CREAT SERPL-MCNC: 0.8 MG/DL (ref 0.5–1.05)
EGFRCR SERPLBLD CKD-EPI 2021: 80 ML/MIN/1.73M*2
EOSINOPHIL # BLD AUTO: 0.41 X10*3/UL (ref 0–0.7)
EOSINOPHIL NFR BLD AUTO: 5.1 %
ERYTHROCYTE [DISTWIDTH] IN BLOOD BY AUTOMATED COUNT: 12.4 % (ref 11.5–14.5)
FLUAV RNA RESP QL NAA+PROBE: NOT DETECTED
FLUBV RNA RESP QL NAA+PROBE: NOT DETECTED
GLUCOSE SERPL-MCNC: 88 MG/DL (ref 74–99)
HCT VFR BLD AUTO: 43.9 % (ref 36–46)
HGB BLD-MCNC: 14.3 G/DL (ref 12–16)
IMM GRANULOCYTES # BLD AUTO: 0.01 X10*3/UL (ref 0–0.7)
IMM GRANULOCYTES NFR BLD AUTO: 0.1 % (ref 0–0.9)
LIPASE SERPL-CCNC: 51 U/L (ref 9–82)
LYMPHOCYTES # BLD AUTO: 3.52 X10*3/UL (ref 1.2–4.8)
LYMPHOCYTES NFR BLD AUTO: 44 %
MCH RBC QN AUTO: 31.8 PG (ref 26–34)
MCHC RBC AUTO-ENTMCNC: 32.6 G/DL (ref 32–36)
MCV RBC AUTO: 98 FL (ref 80–100)
MONOCYTES # BLD AUTO: 0.66 X10*3/UL (ref 0.1–1)
MONOCYTES NFR BLD AUTO: 8.3 %
NEUTROPHILS # BLD AUTO: 3.3 X10*3/UL (ref 1.2–7.7)
NEUTROPHILS NFR BLD AUTO: 41.2 %
NRBC BLD-RTO: 0 /100 WBCS (ref 0–0)
P AXIS: 43 DEGREES
P AXIS: 70 DEGREES
P OFFSET: 197 MS
P OFFSET: 205 MS
P ONSET: 169 MS
P ONSET: 169 MS
PLATELET # BLD AUTO: 299 X10*3/UL (ref 150–450)
POTASSIUM SERPL-SCNC: 3.5 MMOL/L (ref 3.5–5.3)
PR INTERVAL: 106 MS
PR INTERVAL: 116 MS
PROT SERPL-MCNC: 7.1 G/DL (ref 6.4–8.2)
Q ONSET: 222 MS
Q ONSET: 227 MS
QRS COUNT: 13 BEATS
QRS COUNT: 13 BEATS
QRS DURATION: 72 MS
QRS DURATION: 74 MS
QT INTERVAL: 368 MS
QT INTERVAL: 384 MS
QTC CALCULATION(BAZETT): 419 MS
QTC CALCULATION(BAZETT): 432 MS
QTC FREDERICIA: 401 MS
QTC FREDERICIA: 415 MS
R AXIS: 17 DEGREES
R AXIS: 8 DEGREES
RBC # BLD AUTO: 4.5 X10*6/UL (ref 4–5.2)
RSV RNA RESP QL NAA+PROBE: NOT DETECTED
SARS-COV-2 RNA RESP QL NAA+PROBE: NOT DETECTED
SODIUM SERPL-SCNC: 140 MMOL/L (ref 136–145)
T AXIS: 31 DEGREES
T AXIS: 9 DEGREES
T OFFSET: 411 MS
T OFFSET: 414 MS
VENTRICULAR RATE: 76 BPM
VENTRICULAR RATE: 78 BPM
WBC # BLD AUTO: 8 X10*3/UL (ref 4.4–11.3)

## 2025-03-16 PROCEDURE — 2500000001 HC RX 250 WO HCPCS SELF ADMINISTERED DRUGS (ALT 637 FOR MEDICARE OP): Performed by: STUDENT IN AN ORGANIZED HEALTH CARE EDUCATION/TRAINING PROGRAM

## 2025-03-16 PROCEDURE — 99223 1ST HOSP IP/OBS HIGH 75: CPT | Performed by: STUDENT IN AN ORGANIZED HEALTH CARE EDUCATION/TRAINING PROGRAM

## 2025-03-16 PROCEDURE — 87637 SARSCOV2&INF A&B&RSV AMP PRB: CPT | Performed by: STUDENT IN AN ORGANIZED HEALTH CARE EDUCATION/TRAINING PROGRAM

## 2025-03-16 PROCEDURE — 93005 ELECTROCARDIOGRAM TRACING: CPT

## 2025-03-16 PROCEDURE — 96375 TX/PRO/DX INJ NEW DRUG ADDON: CPT | Mod: 59

## 2025-03-16 PROCEDURE — 71275 CT ANGIOGRAPHY CHEST: CPT

## 2025-03-16 PROCEDURE — 85025 COMPLETE CBC W/AUTO DIFF WBC: CPT | Performed by: STUDENT IN AN ORGANIZED HEALTH CARE EDUCATION/TRAINING PROGRAM

## 2025-03-16 PROCEDURE — 36415 COLL VENOUS BLD VENIPUNCTURE: CPT | Performed by: STUDENT IN AN ORGANIZED HEALTH CARE EDUCATION/TRAINING PROGRAM

## 2025-03-16 PROCEDURE — 84484 ASSAY OF TROPONIN QUANT: CPT | Performed by: STUDENT IN AN ORGANIZED HEALTH CARE EDUCATION/TRAINING PROGRAM

## 2025-03-16 PROCEDURE — 72050 X-RAY EXAM NECK SPINE 4/5VWS: CPT

## 2025-03-16 PROCEDURE — 84132 ASSAY OF SERUM POTASSIUM: CPT | Performed by: STUDENT IN AN ORGANIZED HEALTH CARE EDUCATION/TRAINING PROGRAM

## 2025-03-16 PROCEDURE — G0378 HOSPITAL OBSERVATION PER HR: HCPCS

## 2025-03-16 PROCEDURE — 96376 TX/PRO/DX INJ SAME DRUG ADON: CPT | Mod: 59

## 2025-03-16 PROCEDURE — 2500000004 HC RX 250 GENERAL PHARMACY W/ HCPCS (ALT 636 FOR OP/ED): Performed by: STUDENT IN AN ORGANIZED HEALTH CARE EDUCATION/TRAINING PROGRAM

## 2025-03-16 PROCEDURE — 99285 EMERGENCY DEPT VISIT HI MDM: CPT | Mod: 25 | Performed by: STUDENT IN AN ORGANIZED HEALTH CARE EDUCATION/TRAINING PROGRAM

## 2025-03-16 PROCEDURE — 2500000002 HC RX 250 W HCPCS SELF ADMINISTERED DRUGS (ALT 637 FOR MEDICARE OP, ALT 636 FOR OP/ED): Mod: MUE | Performed by: STUDENT IN AN ORGANIZED HEALTH CARE EDUCATION/TRAINING PROGRAM

## 2025-03-16 PROCEDURE — 96374 THER/PROPH/DIAG INJ IV PUSH: CPT | Mod: 59

## 2025-03-16 PROCEDURE — 71045 X-RAY EXAM CHEST 1 VIEW: CPT | Performed by: RADIOLOGY

## 2025-03-16 PROCEDURE — 83690 ASSAY OF LIPASE: CPT | Performed by: STUDENT IN AN ORGANIZED HEALTH CARE EDUCATION/TRAINING PROGRAM

## 2025-03-16 PROCEDURE — 83880 ASSAY OF NATRIURETIC PEPTIDE: CPT | Performed by: STUDENT IN AN ORGANIZED HEALTH CARE EDUCATION/TRAINING PROGRAM

## 2025-03-16 PROCEDURE — 71045 X-RAY EXAM CHEST 1 VIEW: CPT

## 2025-03-16 PROCEDURE — 72050 X-RAY EXAM NECK SPINE 4/5VWS: CPT | Performed by: RADIOLOGY

## 2025-03-16 PROCEDURE — 2550000001 HC RX 255 CONTRASTS: Performed by: STUDENT IN AN ORGANIZED HEALTH CARE EDUCATION/TRAINING PROGRAM

## 2025-03-16 RX ORDER — ACETAMINOPHEN 325 MG/1
650 TABLET ORAL EVERY 4 HOURS PRN
Status: DISCONTINUED | OUTPATIENT
Start: 2025-03-16 | End: 2025-03-17 | Stop reason: HOSPADM

## 2025-03-16 RX ORDER — ATENOLOL 25 MG/1
25 TABLET ORAL DAILY
Status: DISCONTINUED | OUTPATIENT
Start: 2025-03-16 | End: 2025-03-17 | Stop reason: HOSPADM

## 2025-03-16 RX ORDER — POLYETHYLENE GLYCOL 3350 17 G/17G
17 POWDER, FOR SOLUTION ORAL DAILY
Status: DISCONTINUED | OUTPATIENT
Start: 2025-03-16 | End: 2025-03-17 | Stop reason: HOSPADM

## 2025-03-16 RX ORDER — ONDANSETRON 4 MG/1
4 TABLET, FILM COATED ORAL EVERY 8 HOURS PRN
Status: DISCONTINUED | OUTPATIENT
Start: 2025-03-16 | End: 2025-03-17 | Stop reason: HOSPADM

## 2025-03-16 RX ORDER — MORPHINE SULFATE 4 MG/ML
4 INJECTION, SOLUTION INTRAMUSCULAR; INTRAVENOUS ONCE
Status: COMPLETED | OUTPATIENT
Start: 2025-03-16 | End: 2025-03-16

## 2025-03-16 RX ORDER — FLUTICASONE PROPIONATE 50 MCG
2 SPRAY, SUSPENSION (ML) NASAL DAILY
Status: DISCONTINUED | OUTPATIENT
Start: 2025-03-16 | End: 2025-03-17 | Stop reason: HOSPADM

## 2025-03-16 RX ORDER — ACETAMINOPHEN 160 MG/5ML
650 SOLUTION ORAL EVERY 4 HOURS PRN
Status: DISCONTINUED | OUTPATIENT
Start: 2025-03-16 | End: 2025-03-17 | Stop reason: HOSPADM

## 2025-03-16 RX ORDER — HEPARIN SODIUM 5000 [USP'U]/ML
5000 INJECTION, SOLUTION INTRAVENOUS; SUBCUTANEOUS EVERY 8 HOURS SCHEDULED
Status: DISCONTINUED | OUTPATIENT
Start: 2025-03-16 | End: 2025-03-17 | Stop reason: HOSPADM

## 2025-03-16 RX ORDER — ONDANSETRON HYDROCHLORIDE 2 MG/ML
4 INJECTION, SOLUTION INTRAVENOUS ONCE
Status: COMPLETED | OUTPATIENT
Start: 2025-03-16 | End: 2025-03-16

## 2025-03-16 RX ORDER — NITROGLYCERIN 0.4 MG/1
0.4 TABLET SUBLINGUAL EVERY 5 MIN PRN
Status: DISCONTINUED | OUTPATIENT
Start: 2025-03-16 | End: 2025-03-17 | Stop reason: HOSPADM

## 2025-03-16 RX ORDER — ACETAMINOPHEN 650 MG/1
650 SUPPOSITORY RECTAL EVERY 4 HOURS PRN
Status: DISCONTINUED | OUTPATIENT
Start: 2025-03-16 | End: 2025-03-17 | Stop reason: HOSPADM

## 2025-03-16 RX ORDER — BUDESONIDE 3 MG/1
3 CAPSULE, COATED PELLETS ORAL DAILY
Status: DISCONTINUED | OUTPATIENT
Start: 2025-03-16 | End: 2025-03-17 | Stop reason: HOSPADM

## 2025-03-16 RX ORDER — MORPHINE SULFATE 2 MG/ML
2 INJECTION, SOLUTION INTRAMUSCULAR; INTRAVENOUS EVERY 4 HOURS PRN
Status: DISCONTINUED | OUTPATIENT
Start: 2025-03-16 | End: 2025-03-17 | Stop reason: HOSPADM

## 2025-03-16 RX ORDER — SIMVASTATIN 10 MG/1
10 TABLET, FILM COATED ORAL NIGHTLY
Status: DISCONTINUED | OUTPATIENT
Start: 2025-03-16 | End: 2025-03-17 | Stop reason: HOSPADM

## 2025-03-16 RX ORDER — ONDANSETRON HYDROCHLORIDE 2 MG/ML
4 INJECTION, SOLUTION INTRAVENOUS EVERY 8 HOURS PRN
Status: DISCONTINUED | OUTPATIENT
Start: 2025-03-16 | End: 2025-03-17 | Stop reason: HOSPADM

## 2025-03-16 RX ORDER — ASPIRIN 81 MG/1
81 TABLET ORAL DAILY
COMMUNITY

## 2025-03-16 RX ADMIN — ONDANSETRON 4 MG: 2 INJECTION INTRAMUSCULAR; INTRAVENOUS at 14:11

## 2025-03-16 RX ADMIN — ACETAMINOPHEN 650 MG: 325 TABLET ORAL at 22:47

## 2025-03-16 RX ADMIN — SIMVASTATIN 10 MG: 10 TABLET, FILM COATED ORAL at 23:04

## 2025-03-16 RX ADMIN — MORPHINE SULFATE 2 MG: 2 INJECTION, SOLUTION INTRAMUSCULAR; INTRAVENOUS at 19:41

## 2025-03-16 RX ADMIN — MORPHINE SULFATE 4 MG: 4 INJECTION, SOLUTION INTRAMUSCULAR; INTRAVENOUS at 14:10

## 2025-03-16 RX ADMIN — IOHEXOL 100 ML: 350 INJECTION, SOLUTION INTRAVENOUS at 15:04

## 2025-03-16 SDOH — ECONOMIC STABILITY: INCOME INSECURITY
IN THE PAST 12 MONTHS HAS THE ELECTRIC, GAS, OIL, OR WATER COMPANY THREATENED TO SHUT OFF SERVICES IN YOUR HOME?: PATIENT DECLINED

## 2025-03-16 SDOH — SOCIAL STABILITY: SOCIAL INSECURITY: ARE THERE ANY APPARENT SIGNS OF INJURIES/BEHAVIORS THAT COULD BE RELATED TO ABUSE/NEGLECT?: NO

## 2025-03-16 SDOH — ECONOMIC STABILITY: FOOD INSECURITY: WITHIN THE PAST 12 MONTHS, THE FOOD YOU BOUGHT JUST DIDN'T LAST AND YOU DIDN'T HAVE MONEY TO GET MORE.: PATIENT DECLINED

## 2025-03-16 SDOH — SOCIAL STABILITY: SOCIAL INSECURITY: HAVE YOU HAD ANY THOUGHTS OF HARMING ANYONE ELSE?: NO

## 2025-03-16 SDOH — SOCIAL STABILITY: SOCIAL INSECURITY: WITHIN THE LAST YEAR, HAVE YOU BEEN AFRAID OF YOUR PARTNER OR EX-PARTNER?: PATIENT DECLINED

## 2025-03-16 SDOH — SOCIAL STABILITY: SOCIAL INSECURITY
WITHIN THE LAST YEAR, HAVE YOU BEEN HUMILIATED OR EMOTIONALLY ABUSED IN OTHER WAYS BY YOUR PARTNER OR EX-PARTNER?: PATIENT DECLINED

## 2025-03-16 SDOH — SOCIAL STABILITY: SOCIAL INSECURITY: WERE YOU ABLE TO COMPLETE ALL THE BEHAVIORAL HEALTH SCREENINGS?: YES

## 2025-03-16 SDOH — SOCIAL STABILITY: SOCIAL INSECURITY: ARE YOU OR HAVE YOU BEEN THREATENED OR ABUSED PHYSICALLY, EMOTIONALLY, OR SEXUALLY BY ANYONE?: NO

## 2025-03-16 SDOH — SOCIAL STABILITY: SOCIAL INSECURITY: DOES ANYONE TRY TO KEEP YOU FROM HAVING/CONTACTING OTHER FRIENDS OR DOING THINGS OUTSIDE YOUR HOME?: NO

## 2025-03-16 SDOH — SOCIAL STABILITY: SOCIAL INSECURITY
WITHIN THE LAST YEAR, HAVE YOU BEEN KICKED, HIT, SLAPPED, OR OTHERWISE PHYSICALLY HURT BY YOUR PARTNER OR EX-PARTNER?: PATIENT DECLINED

## 2025-03-16 SDOH — SOCIAL STABILITY: SOCIAL INSECURITY: HAVE YOU HAD THOUGHTS OF HARMING ANYONE ELSE?: NO

## 2025-03-16 SDOH — ECONOMIC STABILITY: FOOD INSECURITY
WITHIN THE PAST 12 MONTHS, YOU WORRIED THAT YOUR FOOD WOULD RUN OUT BEFORE YOU GOT THE MONEY TO BUY MORE.: PATIENT DECLINED

## 2025-03-16 SDOH — SOCIAL STABILITY: SOCIAL INSECURITY: DO YOU FEEL UNSAFE GOING BACK TO THE PLACE WHERE YOU ARE LIVING?: NO

## 2025-03-16 SDOH — SOCIAL STABILITY: SOCIAL INSECURITY
WITHIN THE LAST YEAR, HAVE YOU BEEN RAPED OR FORCED TO HAVE ANY KIND OF SEXUAL ACTIVITY BY YOUR PARTNER OR EX-PARTNER?: PATIENT DECLINED

## 2025-03-16 SDOH — SOCIAL STABILITY: SOCIAL INSECURITY: DO YOU FEEL ANYONE HAS EXPLOITED OR TAKEN ADVANTAGE OF YOU FINANCIALLY OR OF YOUR PERSONAL PROPERTY?: NO

## 2025-03-16 SDOH — SOCIAL STABILITY: SOCIAL INSECURITY: ABUSE: ADULT

## 2025-03-16 SDOH — SOCIAL STABILITY: SOCIAL INSECURITY: HAS ANYONE EVER THREATENED TO HURT YOUR FAMILY OR YOUR PETS?: NO

## 2025-03-16 ASSESSMENT — PAIN SCALES - GENERAL
PAINLEVEL_OUTOF10: 8
PAINLEVEL_OUTOF10: 7
PAINLEVEL_OUTOF10: 5 - MODERATE PAIN
PAINLEVEL_OUTOF10: 10 - WORST POSSIBLE PAIN

## 2025-03-16 ASSESSMENT — COGNITIVE AND FUNCTIONAL STATUS - GENERAL
PATIENT BASELINE BEDBOUND: NO
MOBILITY SCORE: 24
DAILY ACTIVITIY SCORE: 24

## 2025-03-16 ASSESSMENT — ACTIVITIES OF DAILY LIVING (ADL)
PATIENT'S MEMORY ADEQUATE TO SAFELY COMPLETE DAILY ACTIVITIES?: YES
JUDGMENT_ADEQUATE_SAFELY_COMPLETE_DAILY_ACTIVITIES: YES
HEARING - RIGHT EAR: FUNCTIONAL
DRESSING YOURSELF: INDEPENDENT
HEARING - LEFT EAR: FUNCTIONAL
GROOMING: INDEPENDENT
WALKS IN HOME: INDEPENDENT
FEEDING YOURSELF: INDEPENDENT
TOILETING: INDEPENDENT
LACK_OF_TRANSPORTATION: PATIENT DECLINED
ADEQUATE_TO_COMPLETE_ADL: YES
BATHING: INDEPENDENT

## 2025-03-16 ASSESSMENT — LIFESTYLE VARIABLES
SKIP TO QUESTIONS 9-10: 1
AUDIT-C TOTAL SCORE: 0
HOW OFTEN DO YOU HAVE A DRINK CONTAINING ALCOHOL: NEVER
AUDIT-C TOTAL SCORE: 0
HOW OFTEN DO YOU HAVE 6 OR MORE DRINKS ON ONE OCCASION: NEVER
HOW MANY STANDARD DRINKS CONTAINING ALCOHOL DO YOU HAVE ON A TYPICAL DAY: PATIENT DOES NOT DRINK

## 2025-03-16 ASSESSMENT — PAIN DESCRIPTION - LOCATION
LOCATION: BACK
LOCATION: BACK
LOCATION: CHEST
LOCATION: BACK
LOCATION: HEAD

## 2025-03-16 ASSESSMENT — PAIN - FUNCTIONAL ASSESSMENT
PAIN_FUNCTIONAL_ASSESSMENT: 0-10

## 2025-03-16 ASSESSMENT — PAIN DESCRIPTION - PAIN TYPE
TYPE: ACUTE PAIN
TYPE: ACUTE PAIN

## 2025-03-16 ASSESSMENT — PATIENT HEALTH QUESTIONNAIRE - PHQ9
SUM OF ALL RESPONSES TO PHQ9 QUESTIONS 1 & 2: 0
2. FEELING DOWN, DEPRESSED OR HOPELESS: NOT AT ALL
1. LITTLE INTEREST OR PLEASURE IN DOING THINGS: NOT AT ALL

## 2025-03-16 ASSESSMENT — COLUMBIA-SUICIDE SEVERITY RATING SCALE - C-SSRS
1. IN THE PAST MONTH, HAVE YOU WISHED YOU WERE DEAD OR WISHED YOU COULD GO TO SLEEP AND NOT WAKE UP?: NO
6. HAVE YOU EVER DONE ANYTHING, STARTED TO DO ANYTHING, OR PREPARED TO DO ANYTHING TO END YOUR LIFE?: NO
2. HAVE YOU ACTUALLY HAD ANY THOUGHTS OF KILLING YOURSELF?: NO

## 2025-03-16 ASSESSMENT — PAIN DESCRIPTION - ORIENTATION: ORIENTATION: LEFT

## 2025-03-16 ASSESSMENT — PAIN DESCRIPTION - DESCRIPTORS: DESCRIPTORS: TIGHTNESS;TINGLING

## 2025-03-16 ASSESSMENT — PAIN DESCRIPTION - PROGRESSION: CLINICAL_PROGRESSION: NOT CHANGED

## 2025-03-16 NOTE — H&P
History Of Present Illness  Tonie Polanco is a 69-year-old female with past medical history of anxiety, COPD, question hyperlipidemia who presented to the ER with vague chest pain like symptoms.  Patient said that she started having chest pain about an hour ago with some tingling in her chest but has been on and off for couple of days.  Also reports shortness of breath with exertion that has been coming ongoing, endorses a dry cough that can be productive and subjective feeling of fever no chills nausea vomiting diarrhea sore throat runny nose.  No previous history of coronary artery disease, DVT or PE.  Complaining of pain from the base of her neck to left arm.  EKG did not show any significant ST-T segment changes workup including troponins were within normal limits flu and COVID were negative.  CT angio chest did not show any PE or pneumonia   Past Medical History  Past Medical History:   Diagnosis Date    Acute recurrent maxillary sinusitis 06/24/2022    Acute recurrent maxillary sinusitis    Acute recurrent maxillary sinusitis 09/29/2020    Acute recurrent maxillary sinusitis    Bitten or stung by nonvenomous insect and other nonvenomous arthropods, initial encounter 09/22/2019    Bug bite with infection, initial encounter    Candidiasis, unspecified     Yeast infection    Candidiasis, unspecified     Yeast infection    Cellulitis of right finger 09/25/2019    Cellulitis of right middle finger    Encounter for general adult medical examination without abnormal findings 10/26/2020    Encounter for Medicare annual wellness exam    Encounter for other preprocedural examination 01/17/2020    Pre-op evaluation    Encounter for other screening for malignant neoplasm of breast 10/05/2020    Breast cancer screening    Epidermal cyst 04/13/2021    Inclusion cyst    Flushing 06/20/2019    Vasomotor flushing    Generalized anxiety disorder 08/17/2019    Generalized anxiety disorder    Impacted cerumen, bilateral      Bilateral impacted cerumen    Laceration without foreign body, right lower leg, initial encounter 10/19/2020    Laceration of leg, right    Otalgia, right ear 07/20/2020    Right ear pain    Other conditions influencing health status 08/04/2022    History of cough    Other specified soft tissue disorders 09/25/2019    Swollen finger    Pain in right foot 01/18/2020    Right foot pain    Pain in right hand 10/14/2019    Pain of right hand    Personal history of diseases of the skin and subcutaneous tissue 10/19/2020    History of cellulitis    Personal history of diseases of the skin and subcutaneous tissue 02/02/2021    History of seborrheic keratosis    Personal history of other benign neoplasm 02/02/2021    History of hemangioma    Personal history of other diseases of the musculoskeletal system and connective tissue 01/04/2022    History of joint pain    Personal history of other diseases of the nervous system and sense organs 08/19/2021    History of ear pain    Personal history of other diseases of the respiratory system 08/03/2022    History of acute bronchitis    Personal history of other diseases of the respiratory system 08/03/2022    History of paranasal sinus congestion    Personal history of other diseases of the respiratory system 08/03/2022    History of sinusitis    Personal history of other drug therapy 09/27/2019    History of influenza vaccination    Personal history of other medical treatment 10/07/2019    History of screening mammography    Personal history of other specified conditions 01/17/2020    History of urinary frequency    Personal history of other specified conditions 01/07/2020    History of urinary urgency    Personal history of other specified conditions 01/07/2020    History of dysuria    Personal history of urinary (tract) infections 12/03/2020    History of urinary tract infection    Presence of unspecified artificial hip joint 05/26/2021    Status post THR (total hip replacement)  "   Primary insomnia 2019    Primary insomnia    Unspecified otitis externa, unspecified ear 2022    Otitis externa    Unspecified otitis externa, unspecified ear 2022    Otitis externa       Surgical History  Past Surgical History:   Procedure Laterality Date    OTHER SURGICAL HISTORY  2021    Joint replacement procedure    OTHER SURGICAL HISTORY  2021    Back surgery        Social History  She is a former smoker, quit long time ago no regular alcohol use  Family History  Father  of coronary artery disease in his 60s     Allergies  Doxycycline, Levofloxacin, and Metronidazole    Review of Systems  As per HPI, comprehensive review of systems performed  Physical Exam  HENT:      Head: Normocephalic and atraumatic.   Eyes:      Extraocular Movements: Extraocular movements intact.      Conjunctiva/sclera: Conjunctivae normal.   Cardiovascular:      Rate and Rhythm: Normal rate and regular rhythm.   Pulmonary:      Effort: Pulmonary effort is normal. No respiratory distress.      Breath sounds: Normal breath sounds. No stridor.   Abdominal:      Palpations: Abdomen is soft.      Tenderness: There is no abdominal tenderness. There is no guarding or rebound.   Musculoskeletal:         General: No swelling or deformity.      Comments: Some neck stiffness   Skin:     Findings: No lesion or rash.   Neurological:      General: No focal deficit present.      Mental Status: She is oriented to person, place, and time.   Psychiatric:         Mood and Affect: Mood normal.         Behavior: Behavior normal.          Last Recorded Vitals  Blood pressure 127/77, pulse 70, temperature 37.3 °C (99.1 °F), temperature source Temporal, resp. rate 18, height 1.626 m (5' 4\"), weight 54.4 kg (120 lb), SpO2 95%.    Relevant Results             Assessment/Plan   Assessment & Plan  Chest pain      69-year-old female with hyperlipidemia hypertension, TIA, anxiety presents with chest pain and other symptoms    Her " symptoms are most likely explained by generalized anxiety disorder  EKG did not show any ischemic changes troponin 2 sets have been normal  Will order a TTE  Cardiology consult  Sublingual nitro for pain  Cervical spine x-ray to rule out cervical spine deformity  Continue statin, beta-blocker   Respiratory inhalers  Symptomatic treatment, supportive care  DVT prophylaxis        Sohail Bolton MD

## 2025-03-16 NOTE — ED PROVIDER NOTES
HPI   Chief Complaint   Patient presents with    Chest Pain     Pt reports chest pain for an hour shooting down left arm and into neck       Patient is a 69-year-old female with history of hypertension, hyperlipidemia, TIA, anxiety who presents for chest pain.  Patient states that she began having chest pain about an hour ago is also having tingling in her chest that is been off and on for the last couple days.  Dors is shortness of breath with exertion has been off and on.  Endorses a dry cough that is somewhat productive at times, feelings of fevers versus sweats.  No chills, runny nose, sore throat, nausea, vomit, diarrhea, abdominal pain.  Denies history of MI, CVA, DVT or PE.  States her pain goes into the base of her left neck down her left arm.  No cardiac stents.  No recent stress test.  Denies history of tobacco or alcohol.  Positive family history in her father.              Patient History   Past Medical History:   Diagnosis Date    Acute recurrent maxillary sinusitis 06/24/2022    Acute recurrent maxillary sinusitis    Acute recurrent maxillary sinusitis 09/29/2020    Acute recurrent maxillary sinusitis    Bitten or stung by nonvenomous insect and other nonvenomous arthropods, initial encounter 09/22/2019    Bug bite with infection, initial encounter    Candidiasis, unspecified     Yeast infection    Candidiasis, unspecified     Yeast infection    Cellulitis of right finger 09/25/2019    Cellulitis of right middle finger    Encounter for general adult medical examination without abnormal findings 10/26/2020    Encounter for Medicare annual wellness exam    Encounter for other preprocedural examination 01/17/2020    Pre-op evaluation    Encounter for other screening for malignant neoplasm of breast 10/05/2020    Breast cancer screening    Epidermal cyst 04/13/2021    Inclusion cyst    Flushing 06/20/2019    Vasomotor flushing    Generalized anxiety disorder 08/17/2019    Generalized anxiety disorder     Impacted cerumen, bilateral     Bilateral impacted cerumen    Laceration without foreign body, right lower leg, initial encounter 10/19/2020    Laceration of leg, right    Otalgia, right ear 07/20/2020    Right ear pain    Other conditions influencing health status 08/04/2022    History of cough    Other specified soft tissue disorders 09/25/2019    Swollen finger    Pain in right foot 01/18/2020    Right foot pain    Pain in right hand 10/14/2019    Pain of right hand    Personal history of diseases of the skin and subcutaneous tissue 10/19/2020    History of cellulitis    Personal history of diseases of the skin and subcutaneous tissue 02/02/2021    History of seborrheic keratosis    Personal history of other benign neoplasm 02/02/2021    History of hemangioma    Personal history of other diseases of the musculoskeletal system and connective tissue 01/04/2022    History of joint pain    Personal history of other diseases of the nervous system and sense organs 08/19/2021    History of ear pain    Personal history of other diseases of the respiratory system 08/03/2022    History of acute bronchitis    Personal history of other diseases of the respiratory system 08/03/2022    History of paranasal sinus congestion    Personal history of other diseases of the respiratory system 08/03/2022    History of sinusitis    Personal history of other drug therapy 09/27/2019    History of influenza vaccination    Personal history of other medical treatment 10/07/2019    History of screening mammography    Personal history of other specified conditions 01/17/2020    History of urinary frequency    Personal history of other specified conditions 01/07/2020    History of urinary urgency    Personal history of other specified conditions 01/07/2020    History of dysuria    Personal history of urinary (tract) infections 12/03/2020    History of urinary tract infection    Presence of unspecified artificial hip joint 05/26/2021    Status  post THR (total hip replacement)    Primary insomnia 06/20/2019    Primary insomnia    Unspecified otitis externa, unspecified ear 01/04/2022    Otitis externa    Unspecified otitis externa, unspecified ear 01/04/2022    Otitis externa     Past Surgical History:   Procedure Laterality Date    OTHER SURGICAL HISTORY  09/14/2021    Joint replacement procedure    OTHER SURGICAL HISTORY  09/14/2021    Back surgery     No family history on file.  Social History     Tobacco Use    Smoking status: Never     Passive exposure: Never    Smokeless tobacco: Never   Vaping Use    Vaping status: Never Used   Substance Use Topics    Alcohol use: Not Currently    Drug use: Never       Physical Exam   ED Triage Vitals [03/16/25 1319]   Temperature Heart Rate Respirations BP   37.3 °C (99.1 °F) 76 18 140/76      Pulse Ox Temp Source Heart Rate Source Patient Position   99 % Temporal Monitor Sitting      BP Location FiO2 (%)     Left arm --       Physical Exam  Constitutional:       General: She is not in acute distress.  HENT:      Head: Normocephalic.   Eyes:      Extraocular Movements: Extraocular movements intact.      Conjunctiva/sclera: Conjunctivae normal.      Pupils: Pupils are equal, round, and reactive to light.   Cardiovascular:      Rate and Rhythm: Normal rate and regular rhythm.      Pulses: Normal pulses.           Radial pulses are 2+ on the right side and 2+ on the left side.        Dorsalis pedis pulses are 2+ on the right side and 2+ on the left side.      Heart sounds: Normal heart sounds.   Pulmonary:      Effort: Pulmonary effort is normal.      Breath sounds: Normal breath sounds.   Abdominal:      General: There is no distension.      Palpations: Abdomen is soft. There is no mass.      Tenderness: There is no abdominal tenderness. There is no guarding.   Musculoskeletal:         General: No deformity.      Cervical back: Normal range of motion and neck supple.      Right lower leg: No edema.      Left lower  leg: No edema.   Skin:     General: Skin is warm and dry.      Findings: No lesion or rash.   Neurological:      General: No focal deficit present.      Mental Status: She is alert and oriented to person, place, and time. Mental status is at baseline.      Cranial Nerves: No cranial nerve deficit.      Sensory: No sensory deficit.      Motor: No weakness.   Psychiatric:         Mood and Affect: Mood normal.           ED Course & MDM   Diagnoses as of 03/16/25 1834   Chest pain, unspecified type                 No data recorded     [place] Coma Scale Score: 15 (03/16/25 1319 : Mela Hodge RN)                           Medical Decision Making  EKG on my interpretation: Rate 70, rhythm regular, axis normal, , QRS 74, QTc 419, T waves: Unremarkable, ST segments: No elevations or depressions, interpretation: Normal sinus rhythm, no STEMI    EKG on my interpretation: Rate 76, rhythm regular, axis normal, , QRS 72, QTc 432, T waves: Unremarkable, ST segments: No elevations or depressions, interpretation: Sinus rhythm with short VT, no STEMI    EKG on my interpretation: Rate 60, rhythm regular, axis normal, , QRS 60, QTc 429, T waves: Unremarkable, ST segments: No elevations or depressions, interpretation: Normal sinus rhythm, no STEMI    Patient is a 69-year-old female with above-stated past medical history presents for chest pain.  Patient's EKGs are nonischemic, troponins are negative x 2.  Heart score is a 5 making her moderate risk for major verse cardiac event.  She has no recent cardiac evaluation.  CMP is grossly unremarkable.  BNP is not consistent with heart failure, lipase is not consistent pancreatitis.  CBC is not consistent with anemia and she has no leukocytosis or fever which point away from infection.  Influenza, COVID, RSV are negative.  Given her pain, a CT head was ordered which was negative for acute findings such as Boerhaave's, cardiac tamponade, dissection.  I low suspicion for  pulmonary embolism.  Chest x-ray my review did not show signs of pneumonia or pneumothorax, this was confirmed radiology.  Patient is clinically well-appearing on toxic.  She is not having any more chest pain, however still endorses some shoulder pain.  I discussed the patient's case with the medicine service who accepted the patient for admission.    Disclaimer: This note was dictated using speech recognition software. Minor errors in transcription may be present. Please call if questions.     Yimi Sanz MD  [facility]  Contact on [employer]        Problems Addressed:  Chest pain, unspecified type: acute illness or injury    Amount and/or Complexity of Data Reviewed  Labs: ordered.  Radiology: ordered and independent interpretation performed.  ECG/medicine tests: ordered and independent interpretation performed.        Procedure  Procedures     Yimi Sanz MD  03/16/25 1924

## 2025-03-17 ENCOUNTER — APPOINTMENT (OUTPATIENT)
Dept: CARDIOLOGY | Facility: HOSPITAL | Age: 70
End: 2025-03-17
Payer: MEDICARE

## 2025-03-17 VITALS
WEIGHT: 129.19 LBS | BODY MASS INDEX: 22.06 KG/M2 | HEIGHT: 64 IN | SYSTOLIC BLOOD PRESSURE: 149 MMHG | HEART RATE: 68 BPM | TEMPERATURE: 97.9 F | RESPIRATION RATE: 18 BRPM | DIASTOLIC BLOOD PRESSURE: 79 MMHG | OXYGEN SATURATION: 95 %

## 2025-03-17 LAB
ANION GAP SERPL CALC-SCNC: 12 MMOL/L (ref 10–20)
AORTIC VALVE MEAN GRADIENT: 2 MMHG
AORTIC VALVE PEAK VELOCITY: 0.94 M/S
AV PEAK GRADIENT: 4 MMHG
AVA (PEAK VEL): 2.34 CM2
AVA (VTI): 2.02 CM2
BASOPHILS # BLD AUTO: 0.05 X10*3/UL (ref 0–0.1)
BASOPHILS NFR BLD AUTO: 0.8 %
BUN SERPL-MCNC: 9 MG/DL (ref 6–23)
CALCIUM SERPL-MCNC: 8.6 MG/DL (ref 8.6–10.3)
CHLORIDE SERPL-SCNC: 106 MMOL/L (ref 98–107)
CO2 SERPL-SCNC: 27 MMOL/L (ref 21–32)
CREAT SERPL-MCNC: 0.72 MG/DL (ref 0.5–1.05)
EGFRCR SERPLBLD CKD-EPI 2021: >90 ML/MIN/1.73M*2
EJECTION FRACTION APICAL 4 CHAMBER: 62
EJECTION FRACTION: 60 %
EOSINOPHIL # BLD AUTO: 0.37 X10*3/UL (ref 0–0.7)
EOSINOPHIL NFR BLD AUTO: 6.3 %
ERYTHROCYTE [DISTWIDTH] IN BLOOD BY AUTOMATED COUNT: 12.7 % (ref 11.5–14.5)
GLUCOSE SERPL-MCNC: 83 MG/DL (ref 74–99)
HCT VFR BLD AUTO: 39.9 % (ref 36–46)
HGB BLD-MCNC: 13.1 G/DL (ref 12–16)
IMM GRANULOCYTES # BLD AUTO: 0.01 X10*3/UL (ref 0–0.7)
IMM GRANULOCYTES NFR BLD AUTO: 0.2 % (ref 0–0.9)
LEFT ATRIUM VOLUME AREA LENGTH INDEX BSA: 18.6 ML/M2
LEFT VENTRICLE INTERNAL DIMENSION DIASTOLE: 4 CM (ref 3.5–6)
LEFT VENTRICULAR OUTFLOW TRACT DIAMETER: 1.9 CM
LV EJECTION FRACTION BIPLANE: 62 %
LYMPHOCYTES # BLD AUTO: 2.3 X10*3/UL (ref 1.2–4.8)
LYMPHOCYTES NFR BLD AUTO: 38.9 %
MCH RBC QN AUTO: 31.6 PG (ref 26–34)
MCHC RBC AUTO-ENTMCNC: 32.8 G/DL (ref 32–36)
MCV RBC AUTO: 96 FL (ref 80–100)
MITRAL VALVE E/A RATIO: 1
MONOCYTES # BLD AUTO: 0.48 X10*3/UL (ref 0.1–1)
MONOCYTES NFR BLD AUTO: 8.1 %
NEUTROPHILS # BLD AUTO: 2.71 X10*3/UL (ref 1.2–7.7)
NEUTROPHILS NFR BLD AUTO: 45.7 %
NRBC BLD-RTO: 0 /100 WBCS (ref 0–0)
PLATELET # BLD AUTO: 257 X10*3/UL (ref 150–450)
POTASSIUM SERPL-SCNC: 3.9 MMOL/L (ref 3.5–5.3)
RBC # BLD AUTO: 4.14 X10*6/UL (ref 4–5.2)
RIGHT VENTRICLE FREE WALL PEAK S': 11.3 CM/S
RIGHT VENTRICLE PEAK SYSTOLIC PRESSURE: 28.4 MMHG
SODIUM SERPL-SCNC: 141 MMOL/L (ref 136–145)
TRICUSPID ANNULAR PLANE SYSTOLIC EXCURSION: 2.2 CM
WBC # BLD AUTO: 5.9 X10*3/UL (ref 4.4–11.3)

## 2025-03-17 PROCEDURE — 99152 MOD SED SAME PHYS/QHP 5/>YRS: CPT | Performed by: INTERNAL MEDICINE

## 2025-03-17 PROCEDURE — 93458 L HRT ARTERY/VENTRICLE ANGIO: CPT | Performed by: INTERNAL MEDICINE

## 2025-03-17 PROCEDURE — 80048 BASIC METABOLIC PNL TOTAL CA: CPT | Performed by: STUDENT IN AN ORGANIZED HEALTH CARE EDUCATION/TRAINING PROGRAM

## 2025-03-17 PROCEDURE — 2500000001 HC RX 250 WO HCPCS SELF ADMINISTERED DRUGS (ALT 637 FOR MEDICARE OP): Performed by: INTERNAL MEDICINE

## 2025-03-17 PROCEDURE — 99239 HOSP IP/OBS DSCHRG MGMT >30: CPT | Performed by: STUDENT IN AN ORGANIZED HEALTH CARE EDUCATION/TRAINING PROGRAM

## 2025-03-17 PROCEDURE — 7100000010 HC PHASE TWO TIME - EACH INCREMENTAL 1 MINUTE: Performed by: INTERNAL MEDICINE

## 2025-03-17 PROCEDURE — C8929 TTE W OR WO FOL WCON,DOPPLER: HCPCS

## 2025-03-17 PROCEDURE — 99024 POSTOP FOLLOW-UP VISIT: CPT | Performed by: NURSE PRACTITIONER

## 2025-03-17 PROCEDURE — 2500000004 HC RX 250 GENERAL PHARMACY W/ HCPCS (ALT 636 FOR OP/ED): Performed by: STUDENT IN AN ORGANIZED HEALTH CARE EDUCATION/TRAINING PROGRAM

## 2025-03-17 PROCEDURE — 2500000004 HC RX 250 GENERAL PHARMACY W/ HCPCS (ALT 636 FOR OP/ED): Performed by: INTERNAL MEDICINE

## 2025-03-17 PROCEDURE — 36415 COLL VENOUS BLD VENIPUNCTURE: CPT | Performed by: STUDENT IN AN ORGANIZED HEALTH CARE EDUCATION/TRAINING PROGRAM

## 2025-03-17 PROCEDURE — 93306 TTE W/DOPPLER COMPLETE: CPT | Performed by: INTERNAL MEDICINE

## 2025-03-17 PROCEDURE — C1887 CATHETER, GUIDING: HCPCS | Performed by: INTERNAL MEDICINE

## 2025-03-17 PROCEDURE — G0378 HOSPITAL OBSERVATION PER HR: HCPCS

## 2025-03-17 PROCEDURE — 99223 1ST HOSP IP/OBS HIGH 75: CPT | Performed by: INTERNAL MEDICINE

## 2025-03-17 PROCEDURE — 7100000001 HC RECOVERY ROOM TIME - INITIAL BASE CHARGE: Performed by: INTERNAL MEDICINE

## 2025-03-17 PROCEDURE — C1894 INTRO/SHEATH, NON-LASER: HCPCS | Performed by: INTERNAL MEDICINE

## 2025-03-17 PROCEDURE — 85025 COMPLETE CBC W/AUTO DIFF WBC: CPT | Performed by: STUDENT IN AN ORGANIZED HEALTH CARE EDUCATION/TRAINING PROGRAM

## 2025-03-17 PROCEDURE — 2500000001 HC RX 250 WO HCPCS SELF ADMINISTERED DRUGS (ALT 637 FOR MEDICARE OP): Performed by: STUDENT IN AN ORGANIZED HEALTH CARE EDUCATION/TRAINING PROGRAM

## 2025-03-17 PROCEDURE — 2550000001 HC RX 255 CONTRASTS: Performed by: INTERNAL MEDICINE

## 2025-03-17 PROCEDURE — 2720000007 HC OR 272 NO HCPCS: Performed by: INTERNAL MEDICINE

## 2025-03-17 PROCEDURE — 7100000009 HC PHASE TWO TIME - INITIAL BASE CHARGE: Performed by: INTERNAL MEDICINE

## 2025-03-17 PROCEDURE — 96373 THER/PROPH/DIAG INJ IA: CPT | Mod: 59 | Performed by: INTERNAL MEDICINE

## 2025-03-17 PROCEDURE — 7100000002 HC RECOVERY ROOM TIME - EACH INCREMENTAL 1 MINUTE: Performed by: INTERNAL MEDICINE

## 2025-03-17 RX ORDER — LIDOCAINE HYDROCHLORIDE 20 MG/ML
INJECTION, SOLUTION INFILTRATION; PERINEURAL AS NEEDED
Status: DISCONTINUED | OUTPATIENT
Start: 2025-03-17 | End: 2025-03-17 | Stop reason: HOSPADM

## 2025-03-17 RX ORDER — FENTANYL CITRATE 50 UG/ML
INJECTION, SOLUTION INTRAMUSCULAR; INTRAVENOUS AS NEEDED
Status: DISCONTINUED | OUTPATIENT
Start: 2025-03-17 | End: 2025-03-17 | Stop reason: HOSPADM

## 2025-03-17 RX ORDER — MIDAZOLAM HYDROCHLORIDE 1 MG/ML
INJECTION, SOLUTION INTRAMUSCULAR; INTRAVENOUS AS NEEDED
Status: DISCONTINUED | OUTPATIENT
Start: 2025-03-17 | End: 2025-03-17 | Stop reason: HOSPADM

## 2025-03-17 RX ORDER — NITROGLYCERIN 40 MG/100ML
INJECTION INTRAVENOUS AS NEEDED
Status: DISCONTINUED | OUTPATIENT
Start: 2025-03-17 | End: 2025-03-17 | Stop reason: HOSPADM

## 2025-03-17 RX ORDER — HEPARIN SODIUM 1000 [USP'U]/ML
INJECTION, SOLUTION INTRAVENOUS; SUBCUTANEOUS AS NEEDED
Status: DISCONTINUED | OUTPATIENT
Start: 2025-03-17 | End: 2025-03-17 | Stop reason: HOSPADM

## 2025-03-17 RX ORDER — ASPIRIN 325 MG
TABLET ORAL AS NEEDED
Status: DISCONTINUED | OUTPATIENT
Start: 2025-03-17 | End: 2025-03-17 | Stop reason: HOSPADM

## 2025-03-17 RX ADMIN — ACETAMINOPHEN 650 MG: 325 TABLET ORAL at 09:38

## 2025-03-17 RX ADMIN — POLYETHYLENE GLYCOL 3350 17 G: 17 POWDER, FOR SOLUTION ORAL at 09:32

## 2025-03-17 RX ADMIN — ATENOLOL 25 MG: 25 TABLET ORAL at 09:32

## 2025-03-17 RX ADMIN — PERFLUTREN 2 ML OF DILUTION: 6.52 INJECTION, SUSPENSION INTRAVENOUS at 13:25

## 2025-03-17 ASSESSMENT — PAIN SCALES - GENERAL
PAINLEVEL_OUTOF10: 0 - NO PAIN

## 2025-03-17 ASSESSMENT — PAIN - FUNCTIONAL ASSESSMENT: PAIN_FUNCTIONAL_ASSESSMENT: 0-10

## 2025-03-17 NOTE — CONSULTS
Inpatient consult to Cardiology  Consult performed by: Joaquim Ramirez DO  Consult ordered by: Sohail Bolton MD  Reason for consult: chest pain                                                          Date:   3/17/2025  Patient name:  Tonie Polanco  Date of admission:  3/16/2025  1:22 PM  MRN:   [id number]  YOB: 1955  Time of Consult:  9:56 AM    Consulting Cardiologist/JOANNE: DANIEL Abreu, CNP  Primary Cardiologist: None  Referring Provider: Dr. Bolton      Admission Diagnosis:     Chest pain      History of Present Illness:      69-year-old female with past medical history of anxiety, COPD, hypertension, hyperlipidemia who presented to [facility] emergency department yesterday with vague symptoms and reported chest discomfort.  Patient reported that she started having chest pain yesterday that felt pinpoint in nature as if somebody was stabbing her chest with a needle.  She reports the pain has been on and off for the past few days.  She also complains of neck and jaw pain.  She also endorses exertional dyspnea especially when doing stairs associated with diaphoresis.  She denies any current chest pain.  She denies any prior cardiovascular workup.  She does have a history of anxiety and neck pain in which she is following with a orthopedic specialist.  Lab work in the emergency department was unremarkable.  Troponins remain negative.  EKGs showed normal sinus rhythm with no obvious ST wave abnormalities.  No STEMI criteria.  CT angio chest abdomen pelvis showed no evidence of thoracic or abdominal aortic dissection or aneurysm.  Mild atherosclerotic calcifications of the aorta.  The patient was admitted to the medicine service and general cardiology was consulted for chest pain.    Previous Cardiac Testing:      Echocardiogram     [place] [id number]  Tel [phone] Fax  [phone]    TRANSTHORACIC ECHOCARDIOGRAM REPORT      Patient Name:     FITO BARRON Reading Physician:   [id number] Joaquim Ramirez DO  Study Date:       11/10/2022     Referring Physician: NAKUL ECHEVERRIA  MRN/PID:          [id number]       PCP:  Accession/Order#: [id number]   Department Location: [facility] Echo Lab  YOB: 1955       Fellow:  Gender:           F              Nurse:               Efra Patricio RN  Admit Date:       11/10/2022     Sonographer:         Monique Day [facility]  Admission Status: Outpatient     Additional Staff:  Height:           162.56 cm      CC Report to:  Weight:           55.34 kg       Study Type:          Echocardiogram  BSA:              1.59 m2  Blood Pressure: 122 /62 mmHg    Diagnosis/ICD: G45.8-Other transient cerebral ischemic attacks and related  syndromes  Indication:    PFO  Procedure/CPT: Echo Complete w Full [id number]    Patient History:  Pertinent History: HTN, TIA and Dyslipidemia.    Study Detail: The following Echo studies were performed: 2D, M-Mode, Doppler and  color flow. Agitated saline used as a contrast agent for  intraseptal flow evaluation.      PHYSICIAN INTERPRETATION:  Left Ventricle: Left ventricular systolic function is normal, with an estimated ejection fraction of 60%. There are no regional wall motion abnormalities. The left ventricular cavity size is normal. Spectral Doppler shows a normal pattern of left ventricular diastolic filling.  LV Wall Scoring:  All segments are normal.    Left Atrium: The left atrium is upper limits of normal in size. A bubble study using agitated saline was performed. Bubble study is negative.  Right Ventricle: The right ventricle is normal in size. There is normal right ventricular global systolic function.  Right Atrium: The right atrium is mildly dilated.  Aortic Valve: The aortic valve appears structurally normal. The aortic valve appears tricuspid. There is no evidence of aortic valve  stenosis.  There is no evidence of aortic valve regurgitation. The peak instantaneous gradient of the aortic valve is 4.7 mmHg. The mean gradient of the aortic valve is 3.0 mmHg.  Mitral Valve: The mitral valve is normal in structure. There is no evidence of mitral valve stenosis. There is normal mitral valve leaflet mobility. There is mild mitral annular calcification. There is mild to moderate mitral valve regurgitation.  Tricuspid Valve: The tricuspid valve is structurally normal. There is normal tricuspid valve leaflet mobility. There is moderate tricuspid regurgitation.  Pulmonic Valve: The pulmonic valve is structurally normal. There is no indication of pulmonic valve regurgitation.  Pericardium: There is no pericardial effusion noted.  Aorta: The aortic root is normal.  Pulmonary Artery: The tricuspid regurgitant velocity is 3.04 m/s, and with an estimated right atrial pressure of 3 mmHg, the estimated pulmonary artery pressure is mild to moderately elevated with the RVSP at 40.0 mmHg.  Systemic Veins: The inferior vena cava appears to be of normal size.  In comparison to the previous echocardiogram(s): There are no prior studies on this patient for comparison purposes.      CONCLUSIONS:  1. Left ventricular systolic function is normal with a 60% estimated ejection fraction.  2. There is no evidence of mitral valve stenosis.  3. Mild to moderate mitral valve regurgitation.  4. Moderate tricuspid regurgitation.  5. Aortic valve stenosis is not present.  6. Mild to moderately elevated pulmonary artery pressure.    RECOMMENDATIONS:  Transthoracic echo has low negative predictive value for mass, vegetation, or embolic source. Consider SHADI or MRI if clinically indicated.    QUANTITATIVE DATA SUMMARY:  2D MEASUREMENTS:  Normal Ranges:  Ao Root d:     2.60 cm   (2.0-3.7cm)  LAs:           2.70 cm   (2.7-4.0cm)  IVSd:          0.82 cm   (0.6-1.1cm)  LVPWd:         0.87 cm   (0.6-1.1cm)  LVIDd:         4.04 cm    (3.9-5.9cm)  LVIDs:         2.77 cm  LV Mass Index: 64.4 g/m2  LV % FS        31.4 %    LA VOLUME:  Normal Ranges:  LA Vol A4C:        26.3 ml    (22+/-6mL/m2)  LA Vol A2C:        23.4 ml  LA Vol BP:         25.4 ml  LA Vol Index A4C:  16.6ml/m2  LA Vol Index A2C:  14.8 ml/m2  LA Vol Index BP:   16.0 ml/m2  LA Area A4C:       11.4 cm2  LA Area A2C:       10.5 cm2  LA Major Axis A4C: 4.2 cm  LA Major Axis A2C: 4.0 cm  LA Volume Index:   14.5 ml/m2  LA Vol A4C:        25.0 ml  LA Vol A2C:        21.0 ml    RA VOLUME BY A/L METHOD:  Normal Ranges:  RA Vol A4C:        47.2 ml    (8.3-19.5ml)  RA Vol Index A4C:  29.8 ml/m2  RA Area A4C:       16.5 cm2  RA Major Axis A4C: 4.9 cm    M-MODE MEASUREMENTS:  Normal Ranges:  Ao Root: 2.90 cm (2.0-3.7cm)  AoV Exc: 2.20 cm (1.5-2.5cm)  LAs:     2.90 cm (2.7-4.0cm)    AORTA MEASUREMENTS:  Normal Ranges:  AoV Exc: 2.20 cm (1.5-2.5cm)    LV SYSTOLIC FUNCTION BY 2D PLANIMETRY (MOD):  Normal Ranges:  EF-A4C View: 62.9 % (>=55%)  EF-A2C View: 58.7 %  EF-Biplane:  60.2 %    LV DIASTOLIC FUNCTION:  Normal Ranges:  MV Peak E:        0.57 m/s   (0.7-1.2 m/s)  MV Peak A:        0.54 m/s   (0.42-0.7 m/s)  E/A Ratio:        1.05       (1.0-2.2)  MV e'             0.10 m/s   (>8.0)  MV lateral e'     0.10 m/s  MV medial e'      0.07 m/s  E/e' Ratio:       5.77       (<8.0)  PulmV Sys Cash:    81.90 cm/s  PulmV Desir Cash:   54.80 cm/s  PulmV S/D Cash:    1.50  PulmV A Revs Cash: 32.10 cm/s  PulmV A Revs Dur: 87.00 msec    MITRAL VALVE:  Normal Ranges:  MV DT: 314 msec (150-240msec)    AORTIC VALVE:  Normal Ranges:  AoV Vmax:                1.08 m/s (<=1.7m/s)  AoV Peak P.7 mmHg (<20mmHg)  AoV Mean PG:             3.0 mmHg (1.7-11.5mmHg)  LVOT Max Cash:            0.78 m/s (<=1.1m/s)  AoV VTI:                 22.10 cm (18-25cm)  LVOT VTI:                15.70 cm  LVOT Diameter:           2.00 cm  (1.8-2.4cm)  AoV Area, VTI:           2.23 cm2 (2.5-5.5cm2)  AoV Area,Vmax:            2.27 cm2 (2.5-4.5cm2)  AoV Dimensionless Index: 0.71    RIGHT VENTRICLE:  RV 1   3.9 cm  RV 2   3.0 cm  RV 3   6.1 cm  TAPSE: 24.0 mm  RV s'  0.14 m/s    TRICUSPID VALVE/RVSP:  Normal Ranges:  Peak TR Velocity: 3.04 m/s  RV Syst Pressure: 40.0 mmHg (< 30mmHg)    PULMONIC VALVE:  Normal Ranges:  PV Accel Time: 108 msec (>120ms)  PV Max Cash:    0.8 m/s  (0.6-0.9m/s)  PV Max P.3 mmHg    Pulmonary Veins:  PulmV A Revs Dur: 87.00 msec  PulmV A Revs Cash: 32.10 cm/s  PulmV Desir Cash:   54.80 cm/s  PulmV S/D Cash:    1.50  PulmV Sys Cash:    81.90 cm/s      [id number] Joaquim Ramirez DO  Electronically signed on 11/10/2022 at 12:02:21 PM      Wall Scoring         Final     Coronary Angiography: No results found for this or any previous visit from the past 1800 days.    Nuclear:No results found for this or any previous visit from the past 1800 days.          Allergies:     Allergies   Allergen Reactions    Doxycycline Blurred vision    Levofloxacin Unknown    Metronidazole Unknown         Past Medical History:     Past Medical History:   Diagnosis Date    Acute recurrent maxillary sinusitis 2022    Acute recurrent maxillary sinusitis    Acute recurrent maxillary sinusitis 2020    Acute recurrent maxillary sinusitis    Bitten or stung by nonvenomous insect and other nonvenomous arthropods, initial encounter 2019    Bug bite with infection, initial encounter    Candidiasis, unspecified     Yeast infection    Candidiasis, unspecified     Yeast infection    Cellulitis of right finger 2019    Cellulitis of right middle finger    Encounter for general adult medical examination without abnormal findings 10/26/2020    Encounter for Medicare annual wellness exam    Encounter for other preprocedural examination 2020    Pre-op evaluation    Encounter for other screening for malignant neoplasm of breast 10/05/2020    Breast cancer screening    Epidermal cyst 2021    Inclusion cyst    Flushing  06/20/2019    Vasomotor flushing    Generalized anxiety disorder 08/17/2019    Generalized anxiety disorder    Impacted cerumen, bilateral     Bilateral impacted cerumen    Laceration without foreign body, right lower leg, initial encounter 10/19/2020    Laceration of leg, right    Otalgia, right ear 07/20/2020    Right ear pain    Other conditions influencing health status 08/04/2022    History of cough    Other specified soft tissue disorders 09/25/2019    Swollen finger    Pain in right foot 01/18/2020    Right foot pain    Pain in right hand 10/14/2019    Pain of right hand    Personal history of diseases of the skin and subcutaneous tissue 10/19/2020    History of cellulitis    Personal history of diseases of the skin and subcutaneous tissue 02/02/2021    History of seborrheic keratosis    Personal history of other benign neoplasm 02/02/2021    History of hemangioma    Personal history of other diseases of the musculoskeletal system and connective tissue 01/04/2022    History of joint pain    Personal history of other diseases of the nervous system and sense organs 08/19/2021    History of ear pain    Personal history of other diseases of the respiratory system 08/03/2022    History of acute bronchitis    Personal history of other diseases of the respiratory system 08/03/2022    History of paranasal sinus congestion    Personal history of other diseases of the respiratory system 08/03/2022    History of sinusitis    Personal history of other drug therapy 09/27/2019    History of influenza vaccination    Personal history of other medical treatment 10/07/2019    History of screening mammography    Personal history of other specified conditions 01/17/2020    History of urinary frequency    Personal history of other specified conditions 01/07/2020    History of urinary urgency    Personal history of other specified conditions 01/07/2020    History of dysuria    Personal history of urinary (tract) infections  12/03/2020    History of urinary tract infection    Presence of unspecified artificial hip joint 05/26/2021    Status post THR (total hip replacement)    Primary insomnia 06/20/2019    Primary insomnia    Unspecified otitis externa, unspecified ear 01/04/2022    Otitis externa    Unspecified otitis externa, unspecified ear 01/04/2022    Otitis externa       Past Surgical History:     Past Surgical History:   Procedure Laterality Date    OTHER SURGICAL HISTORY  09/14/2021    Joint replacement procedure    OTHER SURGICAL HISTORY  09/14/2021    Back surgery       Family History:     No family history on file.    Social History:     Social History     Tobacco Use    Smoking status: Never     Passive exposure: Never    Smokeless tobacco: Never   Vaping Use    Vaping status: Never Used   Substance Use Topics    Alcohol use: Not Currently    Drug use: Never       CURRENT INPATIENT MEDICATIONS    atenolol, 25 mg, oral, Daily  budesonide EC, 3 mg, oral, Daily  fluticasone, 2 spray, Each Nostril, Daily  heparin (porcine), 5,000 Units, subcutaneous, q8h DARI  polyethylene glycol, 17 g, oral, Daily  simvastatin, 10 mg, oral, Nightly         Current Outpatient Medications   Medication Instructions    aspirin 81 mg, Daily    atenolol (TENORMIN) 25 mg, oral, Daily    azelastine (Astelin) 137 mcg (0.1 %) nasal spray 1 spray, Each Nostril, 2 times daily, Use in each nostril as directed    fluticasone (Flonase Sensimist) 27.5 mcg/actuation nasal spray 1 spray, Each Nostril, Daily RT    Prolia 60 mg, subcutaneous, Every 6 months    simvastatin (Zocor) 10 mg tablet TAKE 1 TABLET BY MOUTH AT BEDTIME        Review of Systems:      12 point review of systems was obtained in detail and is negative other than that detailed above.    Vital Signs:     Vitals:    03/16/25 2237 03/16/25 2247 03/17/25 0555 03/17/25 0741   BP: 134/79  139/72 139/74   BP Location:       Patient Position:    Lying   Pulse: 66  70 74   Resp:   18 14   Temp: 36.7 °C  "(98.1 °F)  36.6 °C (97.9 °F) 36.9 °C (98.4 °F)   TempSrc:       SpO2: 94%  96% 96%   Weight:  58.6 kg (129 lb 3 oz)     Height:  1.626 m (5' 4\")       No intake or output data in the 24 hours ending 03/17/25 0956    Wt Readings from Last 4 Encounters:   03/16/25 58.6 kg (129 lb 3 oz)   03/06/25 57.6 kg (127 lb)   02/08/25 56.8 kg (125 lb 3.2 oz)   01/30/25 56.2 kg (124 lb)       Physical Examination:     Physical Exam  Vitals and nursing note reviewed.   Constitutional:       Appearance: Normal appearance.   HENT:      Head: Normocephalic.      Mouth/Throat:      Mouth: Mucous membranes are moist.   Eyes:      Pupils: Pupils are equal, round, and reactive to light.   Cardiovascular:      Rate and Rhythm: Normal rate and regular rhythm.   Pulmonary:      Effort: Pulmonary effort is normal.   Abdominal:      General: Bowel sounds are normal.      Palpations: Abdomen is soft.   Musculoskeletal:         General: Normal range of motion.   Skin:     General: Skin is warm and dry.      Capillary Refill: Capillary refill takes less than 2 seconds.   Neurological:      Mental Status: She is alert and oriented to person, place, and time.   Psychiatric:         Attention and Perception: Attention normal.         Mood and Affect: Mood is anxious.           Lab:     CBC:   Results from last 7 days   Lab Units 03/17/25  0550 03/16/25  1333   WBC AUTO x10*3/uL 5.9 8.0   RBC AUTO x10*6/uL 4.14 4.50   HEMOGLOBIN g/dL 13.1 14.3   HEMATOCRIT % 39.9 43.9   MCV fL 96 98   MCH pg 31.6 31.8   MCHC g/dL 32.8 32.6   RDW % 12.7 12.4   PLATELETS AUTO x10*3/uL 257 299     CMP:    Results from last 7 days   Lab Units 03/17/25  0550 03/16/25  1333   SODIUM mmol/L 141 140   POTASSIUM mmol/L 3.9 3.5   CHLORIDE mmol/L 106 104   CO2 mmol/L 27 27   BUN mg/dL 9 11   CREATININE mg/dL 0.72 0.80   GLUCOSE mg/dL 83 88   PROTEIN TOTAL g/dL  --  7.1   CALCIUM mg/dL 8.6 9.3   BILIRUBIN TOTAL mg/dL  --  0.6   ALK PHOS U/L  --  90   AST U/L  --  25   ALT U/L  " --  14     BMP:    Results from last 7 days   Lab Units 03/17/25  0550 03/16/25  1333   SODIUM mmol/L 141 140   POTASSIUM mmol/L 3.9 3.5   CHLORIDE mmol/L 106 104   CO2 mmol/L 27 27   BUN mg/dL 9 11   CREATININE mg/dL 0.72 0.80   CALCIUM mg/dL 8.6 9.3   GLUCOSE mg/dL 83 88     Magnesium:    Troponin:    Results from last 7 days   Lab Units 03/16/25  1525 03/16/25  1333   TROPHS ng/L <3 <3     BNP:   Results from last 7 days   Lab Units 03/16/25  1333   BNP pg/mL 33     Lipid Panel:         Diagnostic Studies:   @No results found for this or any previous visit.    Results for orders placed in visit on 11/10/22    Echocardiogram    Narrative  [place] [id number]  Tel [phone] Fax [phone]    TRANSTHORACIC ECHOCARDIOGRAM REPORT      Patient Name:     FITO BARRON Reading Physician:   [id number] Joaquim Ramirez DO  Study Date:       11/10/2022     Referring Physician: NAKUL ECHEVERRIA  MRN/PID:          [id number]       PCP:  Accession/Order#: [id number]   Department Location: [facility] Echo Lab  YOB: 1955       Fellow:  Gender:           F              Nurse:               Efra Patricio RN  Admit Date:       11/10/2022     Sonographer:         Monique Day RDCS  Admission Status: Outpatient     Additional Staff:  Height:           162.56 cm      CC Report to:  Weight:           55.34 kg       Study Type:          Echocardiogram  BSA:              1.59 m2  Blood Pressure: 122 /62 mmHg    Diagnosis/ICD: G45.8-Other transient cerebral ischemic attacks and related  syndromes  Indication:    PFO  Procedure/CPT: Echo Complete w Full Doppler-[id number]    Patient History:  Pertinent History: HTN, TIA and Dyslipidemia.    Study Detail: The following Echo studies were performed: 2D, M-Mode, Doppler and  color flow. Agitated saline used as a contrast agent for  intraseptal flow evaluation.      PHYSICIAN INTERPRETATION:  Left Ventricle: Left ventricular  systolic function is normal, with an estimated ejection fraction of 60%. There are no regional wall motion abnormalities. The left ventricular cavity size is normal. Spectral Doppler shows a normal pattern of left ventricular diastolic filling.  LV Wall Scoring:  All segments are normal.    Left Atrium: The left atrium is upper limits of normal in size. A bubble study using agitated saline was performed. Bubble study is negative.  Right Ventricle: The right ventricle is normal in size. There is normal right ventricular global systolic function.  Right Atrium: The right atrium is mildly dilated.  Aortic Valve: The aortic valve appears structurally normal. The aortic valve appears tricuspid. There is no evidence of aortic valve stenosis.  There is no evidence of aortic valve regurgitation. The peak instantaneous gradient of the aortic valve is 4.7 mmHg. The mean gradient of the aortic valve is 3.0 mmHg.  Mitral Valve: The mitral valve is normal in structure. There is no evidence of mitral valve stenosis. There is normal mitral valve leaflet mobility. There is mild mitral annular calcification. There is mild to moderate mitral valve regurgitation.  Tricuspid Valve: The tricuspid valve is structurally normal. There is normal tricuspid valve leaflet mobility. There is moderate tricuspid regurgitation.  Pulmonic Valve: The pulmonic valve is structurally normal. There is no indication of pulmonic valve regurgitation.  Pericardium: There is no pericardial effusion noted.  Aorta: The aortic root is normal.  Pulmonary Artery: The tricuspid regurgitant velocity is 3.04 m/s, and with an estimated right atrial pressure of 3 mmHg, the estimated pulmonary artery pressure is mild to moderately elevated with the RVSP at 40.0 mmHg.  Systemic Veins: The inferior vena cava appears to be of normal size.  In comparison to the previous echocardiogram(s): There are no prior studies on this patient for comparison  purposes.      CONCLUSIONS:  1. Left ventricular systolic function is normal with a 60% estimated ejection fraction.  2. There is no evidence of mitral valve stenosis.  3. Mild to moderate mitral valve regurgitation.  4. Moderate tricuspid regurgitation.  5. Aortic valve stenosis is not present.  6. Mild to moderately elevated pulmonary artery pressure.    RECOMMENDATIONS:  Transthoracic echo has low negative predictive value for mass, vegetation, or embolic source. Consider SHADI or MRI if clinically indicated.    QUANTITATIVE DATA SUMMARY:  2D MEASUREMENTS:  Normal Ranges:  Ao Root d:     2.60 cm   (2.0-3.7cm)  LAs:           2.70 cm   (2.7-4.0cm)  IVSd:          0.82 cm   (0.6-1.1cm)  LVPWd:         0.87 cm   (0.6-1.1cm)  LVIDd:         4.04 cm   (3.9-5.9cm)  LVIDs:         2.77 cm  LV Mass Index: 64.4 g/m2  LV % FS        31.4 %    LA VOLUME:  Normal Ranges:  LA Vol A4C:        26.3 ml    (22+/-6mL/m2)  LA Vol A2C:        23.4 ml  LA Vol BP:         25.4 ml  LA Vol Index A4C:  16.6ml/m2  LA Vol Index A2C:  14.8 ml/m2  LA Vol Index BP:   16.0 ml/m2  LA Area A4C:       11.4 cm2  LA Area A2C:       10.5 cm2  LA Major Axis A4C: 4.2 cm  LA Major Axis A2C: 4.0 cm  LA Volume Index:   14.5 ml/m2  LA Vol A4C:        25.0 ml  LA Vol A2C:        21.0 ml    RA VOLUME BY A/L METHOD:  Normal Ranges:  RA Vol A4C:        47.2 ml    (8.3-19.5ml)  RA Vol Index A4C:  29.8 ml/m2  RA Area A4C:       16.5 cm2  RA Major Axis A4C: 4.9 cm    M-MODE MEASUREMENTS:  Normal Ranges:  Ao Root: 2.90 cm (2.0-3.7cm)  AoV Exc: 2.20 cm (1.5-2.5cm)  LAs:     2.90 cm (2.7-4.0cm)    AORTA MEASUREMENTS:  Normal Ranges:  AoV Exc: 2.20 cm (1.5-2.5cm)    LV SYSTOLIC FUNCTION BY 2D PLANIMETRY (MOD):  Normal Ranges:  EF-A4C View: 62.9 % (>=55%)  EF-A2C View: 58.7 %  EF-Biplane:  60.2 %    LV DIASTOLIC FUNCTION:  Normal Ranges:  MV Peak E:        0.57 m/s   (0.7-1.2 m/s)  MV Peak A:        0.54 m/s   (0.42-0.7 m/s)  E/A Ratio:        1.05       (1.0-2.2)  MV  e'             0.10 m/s   (>8.0)  MV lateral e'     0.10 m/s  MV medial e'      0.07 m/s  E/e' Ratio:       5.77       (<8.0)  PulmV Sys Cash:    81.90 cm/s  PulmV Desir Cash:   54.80 cm/s  PulmV S/D Cash:    1.50  PulmV A Revs Cash: 32.10 cm/s  PulmV A Revs Dur: 87.00 msec    MITRAL VALVE:  Normal Ranges:  MV DT: 314 msec (150-240msec)    AORTIC VALVE:  Normal Ranges:  AoV Vmax:                1.08 m/s (<=1.7m/s)  AoV Peak P.7 mmHg (<20mmHg)  AoV Mean PG:             3.0 mmHg (1.7-11.5mmHg)  LVOT Max Cash:            0.78 m/s (<=1.1m/s)  AoV VTI:                 22.10 cm (18-25cm)  LVOT VTI:                15.70 cm  LVOT Diameter:           2.00 cm  (1.8-2.4cm)  AoV Area, VTI:           2.23 cm2 (2.5-5.5cm2)  AoV Area,Vmax:           2.27 cm2 (2.5-4.5cm2)  AoV Dimensionless Index: 0.71    RIGHT VENTRICLE:  RV 1   3.9 cm  RV 2   3.0 cm  RV 3   6.1 cm  TAPSE: 24.0 mm  RV s'  0.14 m/s    TRICUSPID VALVE/RVSP:  Normal Ranges:  Peak TR Velocity: 3.04 m/s  RV Syst Pressure: 40.0 mmHg (< 30mmHg)    PULMONIC VALVE:  Normal Ranges:  PV Accel Time: 108 msec (>120ms)  PV Max Cash:    0.8 m/s  (0.6-0.9m/s)  PV Max P.3 mmHg    Pulmonary Veins:  PulmV A Revs Dur: 87.00 msec  PulmV A Revs Cash: 32.10 cm/s  PulmV Desir Cash:   54.80 cm/s  PulmV S/D Cash:    1.50  PulmV Sys Cash:    81.90 cm/s      [id number] Joaquim Ramirez DO  Electronically signed on 11/10/2022 at 12:02:21 PM      Wall Scoring                  Radiology:     XR cervical spine complete 4-5 views   Final Result   Degenerative changes as detailed above. No acute fracture or   subluxation.             MACRO:   None        Signed by: Joseph Schoenberger 3/17/2025 8:42 AM   Dictation workstation:   [id number]      CT angio chest abdomen pelvis   Final Result   No evidence of thoracic or abdominal aorta dissection or aneurysm. No   acute abnormality in the chest, abdomen and pelvis to explain   patient's chest pain and shortness of breath. Additional chronic  and   incidental findings as detailed above.        MACRO:   None        Signed by: Samira Cummins 3/16/2025 4:16 PM   Dictation workstation:   [id number]      XR chest 1 view   Final Result   1.  No evidence of acute cardiopulmonary process.                  MACRO:   None        Signed by: Johnnie Reina 3/16/2025 2:22 PM   Dictation workstation:   [id number]      Transthoracic Echo (TTE) Complete    (Results Pending)       Problem List:     Patient Active Problem List   Diagnosis    Degenerative arthritis of lumbar spine    Degenerative joint disease of hand    Dyslipidemia    Generalized anxiety disorder    HTN (hypertension)    Insomnia    Stress    Vitamin D deficiency    Arrhythmia    Carotid stenosis    TIA (transient ischemic attack)    Recurrent rhinosinusitis    Osteoporosis    Vision loss of left eye    Vasomotor symptoms due to menopause    Intractable migraine without aura and without status migrainosus    Inner ear inflammation, left    Gastroenteritis    Left ear pain    Tear of right rotator cuff    Chest pain       Assessment:   69-year-old female seen evaluate the bedside in conjunction with Twin Erickson RN, CNP in the telemetry unit.    Bedside examination evaluation and interview were performed by me.    Chart reviewed in detail including lab, EKG, x-rays, prior studies and discussed with the patient staff and family.    Impression:  Atypical chest pain  Mild coronary calcifications of the aorta  Hypertension  Hyperlipidemia  Carotid stenosis  Remote history of TIA  Former nicotine  Chronic neck pain      Plan:   Recommendation:  Admitted to medicine.  Remains on telemetry.  Telemetry shows normal sinus rhythm.  Reviewed EKGs from the emergency department which showed nonspecific ST wave abnormalities.  No STEMI criteria.  Remains in normal sinus rhythm.  Continue supplemental O2  Monitor electrolytes, keep potassium greater than 4 and magnesium greater than 2  Check echocardiogram to  assess for any wall motion or valvular abnormality  Low suspicion for ACS however patient has consistent symptoms and story of ischemia.  Discussed options moving forward including noninvasive stress testing versus left heart catheterization.  She did elect to undergo left heart catheterization due to symptoms listed above.  Troponins remain negative.  EKGs are nonspecific.  Risk and benefits were discussed in regards to left heart catheterization.  [facility] today   Reviewed lipid panel from December 2024, unremarkable  Continue aspirin, statin and beta-blocker  Recommend orthopedics evaluation for chronic neck pain  Carotid ultrasound done in 2022 showed mild bilateral carotid plaque with no stenosis greater than 50%.  Further recommendations to follow      Discussion:  Is a 69-year-old female with no prior cardiovascular history except some mild to moderate valvular regurgitation on a remote echo.  LV function the past been normal.  Patient's had some atypical symptoms.  She came to the hospital.  Her labs and EKGs thus far have been unremarkable.  She is still symptomatic and she and her  are both concerned about potential underlying heart disease.  Under the circumstances be best suited for the patient undergo left heart catheterization.  She does have plaque identified on x-ray findings along with prior carotid studies.  Undoubtably has ASHD at least to a mild degree but whether or not there is obstructive disease requiring intervention is to be determined.  I reviewed all risk complication alternatives with the patient and family and all are agreeable.  We have discussed this with the interventional team and they are prepared to take her to the lab today to proceed.  Disposition will follow.      Joaquim Ramirez DO,FACC        [facility]  Adult Gerontology Acute Care Nurse Practitioner  Former[facility] [place]   [facility]  [place]  [phone]      Of note, this documentation is completed using the Dragon Dictation system (voice recognition software). There may be spelling and/or grammatical errors that were not corrected prior to final submission.      Electronically signed by Joaquim Ramirez DO on 3/17/2025 at 9:56 AM

## 2025-03-17 NOTE — DISCHARGE SUMMARY
Discharge Diagnosis  Chest pain    Issues Requiring Follow-Up      Discharge Meds     Medication List      ASK your doctor about these medications     aspirin 81 mg EC tablet   atenolol 25 mg tablet; Commonly known as: Tenormin; Take 1 tablet (25   mg) by mouth once daily.   azelastine 137 mcg (0.1 %) nasal spray; Commonly known as: Astelin;   Administer 1 spray into each nostril 2 times a day. Use in each nostril as   directed   brompheniramine-pseudoeph-DM 2-30-10 mg/5 mL syrup; Take 5 mL by mouth 4   times a day as needed for congestion or cough for up to 10 days.; Ask   about: Should I take this medication?   cefdinir 300 mg capsule; Commonly known as: Omnicef; Take 1 capsule (300   mg) by mouth 2 times a day for 10 days.; Ask about: Should I take this   medication?   Flonase Sensimist 27.5 mcg/actuation nasal spray; Generic drug:   fluticasone; Administer 1 spray into each nostril once daily.   Prolia 60 mg/mL syringe; Generic drug: denosumab; Inject 1 mL (60 mg   total) under the skin every 6 months.   simvastatin 10 mg tablet; Commonly known as: Zocor; TAKE 1 TABLET BY   MOUTH AT BEDTIME       Test Results Pending At Discharge  Pending Labs       No current pending labs.            Hospital Course   69-year-old female with past medical history of anxiety, COPD, hyperlipidemia who presented to the ER with chest pain.  Her chest pain was atypical some of her symptoms are most likely related to cervical spine issues.  X-ray of cervical spine showed severe degenerative changes at C5-6 level will recommend outpatient orthopedic follow-up.  She also underwent cardiac cath which did not show any critical stenosis, no stents were placed.  She is okay to be discharged home will follow-up with primary care.    Pertinent Physical Exam At Time of Discharge  Physical Exam  Eyes:      Extraocular Movements: Extraocular movements intact.      Conjunctiva/sclera: Conjunctivae normal.   Cardiovascular:      Rate and Rhythm:  Normal rate and regular rhythm.   Pulmonary:      Effort: Pulmonary effort is normal. No respiratory distress.      Breath sounds: Normal breath sounds. No stridor.   Abdominal:      Palpations: Abdomen is soft.      Tenderness: There is no abdominal tenderness. There is no guarding or rebound.   Musculoskeletal:         General: No swelling or deformity.      Comments: Some neck stiffness   Skin:     Findings: No lesion or rash.   Neurological:      General: No focal deficit present.      Mental Status: She is oriented to person, place, and time.   Psychiatric:         Mood and Affect: Mood normal.      Outpatient Follow-Up  Future Appointments   Date Time Provider Department Center   3/25/2025  9:15 AM Obdulia Mcdonough, PTA [id number] [place]   4/1/2025  2:15 PM Mathew Batista DO [id number] [place]   4/2/2025  4:15 PM Cathleen Nova, PT [id number] [place]   4/9/2025  3:30 PM Cathleen Nova, PT [id number] [place]   4/14/2025  1:15 PM Alden Galo MD [id number] [place]   4/16/2025  4:15 PM Cathleen Nova, PT [id number] [place]   4/23/2025  4:15 PM Cathleen Nova, PT [id number] [place]   4/30/2025  4:15 PM Cathleen Nova, PT [id number] [place]   5/7/2025  4:15 PM Cathleen Nova, PT [id number] [place]   5/14/2025  4:15 PM Cathleen Nova, PT [id number] [place]         Sohail Bolton MD

## 2025-03-17 NOTE — H&P (VIEW-ONLY)
Inpatient consult to Cardiology  Consult performed by: Joaquim Ramirez DO  Consult ordered by: Sohail Bolton MD  Reason for consult: chest pain                                                          Date:   3/17/2025  Patient name:  Tonie Polanco  Date of admission:  3/16/2025  1:22 PM  MRN:   [id number]  YOB: 1955  Time of Consult:  9:56 AM    Consulting Cardiologist/JOANNE: DANIEL Abreu, CNP  Primary Cardiologist: None  Referring Provider: Dr. Bolton      Admission Diagnosis:     Chest pain      History of Present Illness:      69-year-old female with past medical history of anxiety, COPD, hypertension, hyperlipidemia who presented to [facility] emergency department yesterday with vague symptoms and reported chest discomfort.  Patient reported that she started having chest pain yesterday that felt pinpoint in nature as if somebody was stabbing her chest with a needle.  She reports the pain has been on and off for the past few days.  She also complains of neck and jaw pain.  She also endorses exertional dyspnea especially when doing stairs associated with diaphoresis.  She denies any current chest pain.  She denies any prior cardiovascular workup.  She does have a history of anxiety and neck pain in which she is following with a orthopedic specialist.  Lab work in the emergency department was unremarkable.  Troponins remain negative.  EKGs showed normal sinus rhythm with no obvious ST wave abnormalities.  No STEMI criteria.  CT angio chest abdomen pelvis showed no evidence of thoracic or abdominal aortic dissection or aneurysm.  Mild atherosclerotic calcifications of the aorta.  The patient was admitted to the medicine service and general cardiology was consulted for chest pain.    Previous Cardiac Testing:      Echocardiogram     [place] [id number]  Tel [phone] Fax  [phone]    TRANSTHORACIC ECHOCARDIOGRAM REPORT      Patient Name:     FITO BARRON Reading Physician:   [id number] Joaquim Ramirez DO  Study Date:       11/10/2022     Referring Physician: NAKUL ECHEVERRIA  MRN/PID:          [id number]       PCP:  Accession/Order#: [id number]   Department Location: [facility]  YOB: 1955       Fellow:  Gender:           F              Nurse:               Efra Patricio RN  Admit Date:       11/10/2022     Sonographer:         Monique Day [facility]  Admission Status: Outpatient     Additional Staff:  Height:           162.56 cm      CC Report to:  Weight:           55.34 kg       Study Type:          Echocardiogram  BSA:              1.59 m2  Blood Pressure: 122 /62 mmHg    Diagnosis/ICD: G45.8-Other transient cerebral ischemic attacks and related  syndromes  Indication:    PFO  Procedure/CPT: Echo Complete w Full Doppler-[id number]    Patient History:  Pertinent History: HTN, TIA and Dyslipidemia.    Study Detail: The following Echo studies were performed: 2D, M-Mode, Doppler and  color flow. Agitated saline used as a contrast agent for  intraseptal flow evaluation.      PHYSICIAN INTERPRETATION:  Left Ventricle: Left ventricular systolic function is normal, with an estimated ejection fraction of 60%. There are no regional wall motion abnormalities. The left ventricular cavity size is normal. Spectral Doppler shows a normal pattern of left ventricular diastolic filling.  LV Wall Scoring:  All segments are normal.    Left Atrium: The left atrium is upper limits of normal in size. A bubble study using agitated saline was performed. Bubble study is negative.  Right Ventricle: The right ventricle is normal in size. There is normal right ventricular global systolic function.  Right Atrium: The right atrium is mildly dilated.  Aortic Valve: The aortic valve appears structurally normal. The aortic valve appears tricuspid. There is no evidence of aortic valve  stenosis.  There is no evidence of aortic valve regurgitation. The peak instantaneous gradient of the aortic valve is 4.7 mmHg. The mean gradient of the aortic valve is 3.0 mmHg.  Mitral Valve: The mitral valve is normal in structure. There is no evidence of mitral valve stenosis. There is normal mitral valve leaflet mobility. There is mild mitral annular calcification. There is mild to moderate mitral valve regurgitation.  Tricuspid Valve: The tricuspid valve is structurally normal. There is normal tricuspid valve leaflet mobility. There is moderate tricuspid regurgitation.  Pulmonic Valve: The pulmonic valve is structurally normal. There is no indication of pulmonic valve regurgitation.  Pericardium: There is no pericardial effusion noted.  Aorta: The aortic root is normal.  Pulmonary Artery: The tricuspid regurgitant velocity is 3.04 m/s, and with an estimated right atrial pressure of 3 mmHg, the estimated pulmonary artery pressure is mild to moderately elevated with the RVSP at 40.0 mmHg.  Systemic Veins: The inferior vena cava appears to be of normal size.  In comparison to the previous echocardiogram(s): There are no prior studies on this patient for comparison purposes.      CONCLUSIONS:  1. Left ventricular systolic function is normal with a 60% estimated ejection fraction.  2. There is no evidence of mitral valve stenosis.  3. Mild to moderate mitral valve regurgitation.  4. Moderate tricuspid regurgitation.  5. Aortic valve stenosis is not present.  6. Mild to moderately elevated pulmonary artery pressure.    RECOMMENDATIONS:  Transthoracic echo has low negative predictive value for mass, vegetation, or embolic source. Consider SHADI or MRI if clinically indicated.    QUANTITATIVE DATA SUMMARY:  2D MEASUREMENTS:  Normal Ranges:  Ao Root d:     2.60 cm   (2.0-3.7cm)  LAs:           2.70 cm   (2.7-4.0cm)  IVSd:          0.82 cm   (0.6-1.1cm)  LVPWd:         0.87 cm   (0.6-1.1cm)  LVIDd:         4.04 cm    (3.9-5.9cm)  LVIDs:         2.77 cm  LV Mass Index: 64.4 g/m2  LV % FS        31.4 %    LA VOLUME:  Normal Ranges:  LA Vol A4C:        26.3 ml    (22+/-6mL/m2)  LA Vol A2C:        23.4 ml  LA Vol BP:         25.4 ml  LA Vol Index A4C:  16.6ml/m2  LA Vol Index A2C:  14.8 ml/m2  LA Vol Index BP:   16.0 ml/m2  LA Area A4C:       11.4 cm2  LA Area A2C:       10.5 cm2  LA Major Axis A4C: 4.2 cm  LA Major Axis A2C: 4.0 cm  LA Volume Index:   14.5 ml/m2  LA Vol A4C:        25.0 ml  LA Vol A2C:        21.0 ml    RA VOLUME BY A/L METHOD:  Normal Ranges:  RA Vol A4C:        47.2 ml    (8.3-19.5ml)  RA Vol Index A4C:  29.8 ml/m2  RA Area A4C:       16.5 cm2  RA Major Axis A4C: 4.9 cm    M-MODE MEASUREMENTS:  Normal Ranges:  Ao Root: 2.90 cm (2.0-3.7cm)  AoV Exc: 2.20 cm (1.5-2.5cm)  LAs:     2.90 cm (2.7-4.0cm)    AORTA MEASUREMENTS:  Normal Ranges:  AoV Exc: 2.20 cm (1.5-2.5cm)    LV SYSTOLIC FUNCTION BY 2D PLANIMETRY (MOD):  Normal Ranges:  EF-A4C View: 62.9 % (>=55%)  EF-A2C View: 58.7 %  EF-Biplane:  60.2 %    LV DIASTOLIC FUNCTION:  Normal Ranges:  MV Peak E:        0.57 m/s   (0.7-1.2 m/s)  MV Peak A:        0.54 m/s   (0.42-0.7 m/s)  E/A Ratio:        1.05       (1.0-2.2)  MV e'             0.10 m/s   (>8.0)  MV lateral e'     0.10 m/s  MV medial e'      0.07 m/s  E/e' Ratio:       5.77       (<8.0)  PulmV Sys Cash:    81.90 cm/s  PulmV Desir Cash:   54.80 cm/s  PulmV S/D Cash:    1.50  PulmV A Revs Cash: 32.10 cm/s  PulmV A Revs Dur: 87.00 msec    MITRAL VALVE:  Normal Ranges:  MV DT: 314 msec (150-240msec)    AORTIC VALVE:  Normal Ranges:  AoV Vmax:                1.08 m/s (<=1.7m/s)  AoV Peak P.7 mmHg (<20mmHg)  AoV Mean PG:             3.0 mmHg (1.7-11.5mmHg)  LVOT Max Cash:            0.78 m/s (<=1.1m/s)  AoV VTI:                 22.10 cm (18-25cm)  LVOT VTI:                15.70 cm  LVOT Diameter:           2.00 cm  (1.8-2.4cm)  AoV Area, VTI:           2.23 cm2 (2.5-5.5cm2)  AoV Area,Vmax:            2.27 cm2 (2.5-4.5cm2)  AoV Dimensionless Index: 0.71    RIGHT VENTRICLE:  RV 1   3.9 cm  RV 2   3.0 cm  RV 3   6.1 cm  TAPSE: 24.0 mm  RV s'  0.14 m/s    TRICUSPID VALVE/RVSP:  Normal Ranges:  Peak TR Velocity: 3.04 m/s  RV Syst Pressure: 40.0 mmHg (< 30mmHg)    PULMONIC VALVE:  Normal Ranges:  PV Accel Time: 108 msec (>120ms)  PV Max Cash:    0.8 m/s  (0.6-0.9m/s)  PV Max P.3 mmHg    Pulmonary Veins:  PulmV A Revs Dur: 87.00 msec  PulmV A Revs Cash: 32.10 cm/s  PulmV Desir Cash:   54.80 cm/s  PulmV S/D Cash:    1.50  PulmV Sys Cash:    81.90 cm/s      [id number] Joaquim Ramirez DO  Electronically signed on 11/10/2022 at 12:02:21 PM      Wall Scoring         Final     Coronary Angiography: No results found for this or any previous visit from the past 1800 days.    Nuclear:No results found for this or any previous visit from the past 1800 days.          Allergies:     Allergies   Allergen Reactions    Doxycycline Blurred vision    Levofloxacin Unknown    Metronidazole Unknown         Past Medical History:     Past Medical History:   Diagnosis Date    Acute recurrent maxillary sinusitis 2022    Acute recurrent maxillary sinusitis    Acute recurrent maxillary sinusitis 2020    Acute recurrent maxillary sinusitis    Bitten or stung by nonvenomous insect and other nonvenomous arthropods, initial encounter 2019    Bug bite with infection, initial encounter    Candidiasis, unspecified     Yeast infection    Candidiasis, unspecified     Yeast infection    Cellulitis of right finger 2019    Cellulitis of right middle finger    Encounter for general adult medical examination without abnormal findings 10/26/2020    Encounter for Medicare annual wellness exam    Encounter for other preprocedural examination 2020    Pre-op evaluation    Encounter for other screening for malignant neoplasm of breast 10/05/2020    Breast cancer screening    Epidermal cyst 2021    Inclusion cyst    Flushing  06/20/2019    Vasomotor flushing    Generalized anxiety disorder 08/17/2019    Generalized anxiety disorder    Impacted cerumen, bilateral     Bilateral impacted cerumen    Laceration without foreign body, right lower leg, initial encounter 10/19/2020    Laceration of leg, right    Otalgia, right ear 07/20/2020    Right ear pain    Other conditions influencing health status 08/04/2022    History of cough    Other specified soft tissue disorders 09/25/2019    Swollen finger    Pain in right foot 01/18/2020    Right foot pain    Pain in right hand 10/14/2019    Pain of right hand    Personal history of diseases of the skin and subcutaneous tissue 10/19/2020    History of cellulitis    Personal history of diseases of the skin and subcutaneous tissue 02/02/2021    History of seborrheic keratosis    Personal history of other benign neoplasm 02/02/2021    History of hemangioma    Personal history of other diseases of the musculoskeletal system and connective tissue 01/04/2022    History of joint pain    Personal history of other diseases of the nervous system and sense organs 08/19/2021    History of ear pain    Personal history of other diseases of the respiratory system 08/03/2022    History of acute bronchitis    Personal history of other diseases of the respiratory system 08/03/2022    History of paranasal sinus congestion    Personal history of other diseases of the respiratory system 08/03/2022    History of sinusitis    Personal history of other drug therapy 09/27/2019    History of influenza vaccination    Personal history of other medical treatment 10/07/2019    History of screening mammography    Personal history of other specified conditions 01/17/2020    History of urinary frequency    Personal history of other specified conditions 01/07/2020    History of urinary urgency    Personal history of other specified conditions 01/07/2020    History of dysuria    Personal history of urinary (tract) infections  12/03/2020    History of urinary tract infection    Presence of unspecified artificial hip joint 05/26/2021    Status post THR (total hip replacement)    Primary insomnia 06/20/2019    Primary insomnia    Unspecified otitis externa, unspecified ear 01/04/2022    Otitis externa    Unspecified otitis externa, unspecified ear 01/04/2022    Otitis externa       Past Surgical History:     Past Surgical History:   Procedure Laterality Date    OTHER SURGICAL HISTORY  09/14/2021    Joint replacement procedure    OTHER SURGICAL HISTORY  09/14/2021    Back surgery       Family History:     No family history on file.    Social History:     Social History     Tobacco Use    Smoking status: Never     Passive exposure: Never    Smokeless tobacco: Never   Vaping Use    Vaping status: Never Used   Substance Use Topics    Alcohol use: Not Currently    Drug use: Never       CURRENT INPATIENT MEDICATIONS    atenolol, 25 mg, oral, Daily  budesonide EC, 3 mg, oral, Daily  fluticasone, 2 spray, Each Nostril, Daily  heparin (porcine), 5,000 Units, subcutaneous, q8h DARI  polyethylene glycol, 17 g, oral, Daily  simvastatin, 10 mg, oral, Nightly         Current Outpatient Medications   Medication Instructions    aspirin 81 mg, Daily    atenolol (TENORMIN) 25 mg, oral, Daily    azelastine (Astelin) 137 mcg (0.1 %) nasal spray 1 spray, Each Nostril, 2 times daily, Use in each nostril as directed    fluticasone (Flonase Sensimist) 27.5 mcg/actuation nasal spray 1 spray, Each Nostril, Daily RT    Prolia 60 mg, subcutaneous, Every 6 months    simvastatin (Zocor) 10 mg tablet TAKE 1 TABLET BY MOUTH AT BEDTIME        Review of Systems:      12 point review of systems was obtained in detail and is negative other than that detailed above.    Vital Signs:     Vitals:    03/16/25 2237 03/16/25 2247 03/17/25 0555 03/17/25 0741   BP: 134/79  139/72 139/74   BP Location:       Patient Position:    Lying   Pulse: 66  70 74   Resp:   18 14   Temp: 36.7 °C  "(98.1 °F)  36.6 °C (97.9 °F) 36.9 °C (98.4 °F)   TempSrc:       SpO2: 94%  96% 96%   Weight:  58.6 kg (129 lb 3 oz)     Height:  1.626 m (5' 4\")       No intake or output data in the 24 hours ending 03/17/25 0956    Wt Readings from Last 4 Encounters:   03/16/25 58.6 kg (129 lb 3 oz)   03/06/25 57.6 kg (127 lb)   02/08/25 56.8 kg (125 lb 3.2 oz)   01/30/25 56.2 kg (124 lb)       Physical Examination:     Physical Exam  Vitals and nursing note reviewed.   Constitutional:       Appearance: Normal appearance.   HENT:      Head: Normocephalic.      Mouth/Throat:      Mouth: Mucous membranes are moist.   Eyes:      Pupils: Pupils are equal, round, and reactive to light.   Cardiovascular:      Rate and Rhythm: Normal rate and regular rhythm.   Pulmonary:      Effort: Pulmonary effort is normal.   Abdominal:      General: Bowel sounds are normal.      Palpations: Abdomen is soft.   Musculoskeletal:         General: Normal range of motion.   Skin:     General: Skin is warm and dry.      Capillary Refill: Capillary refill takes less than 2 seconds.   Neurological:      Mental Status: She is alert and oriented to person, place, and time.   Psychiatric:         Attention and Perception: Attention normal.         Mood and Affect: Mood is anxious.           Lab:     CBC:   Results from last 7 days   Lab Units 03/17/25  0550 03/16/25  1333   WBC AUTO x10*3/uL 5.9 8.0   RBC AUTO x10*6/uL 4.14 4.50   HEMOGLOBIN g/dL 13.1 14.3   HEMATOCRIT % 39.9 43.9   MCV fL 96 98   MCH pg 31.6 31.8   MCHC g/dL 32.8 32.6   RDW % 12.7 12.4   PLATELETS AUTO x10*3/uL 257 299     CMP:    Results from last 7 days   Lab Units 03/17/25  0550 03/16/25  1333   SODIUM mmol/L 141 140   POTASSIUM mmol/L 3.9 3.5   CHLORIDE mmol/L 106 104   CO2 mmol/L 27 27   BUN mg/dL 9 11   CREATININE mg/dL 0.72 0.80   GLUCOSE mg/dL 83 88   PROTEIN TOTAL g/dL  --  7.1   CALCIUM mg/dL 8.6 9.3   BILIRUBIN TOTAL mg/dL  --  0.6   ALK PHOS U/L  --  90   AST U/L  --  25   ALT U/L  " --  14     BMP:    Results from last 7 days   Lab Units 03/17/25  0550 03/16/25  1333   SODIUM mmol/L 141 140   POTASSIUM mmol/L 3.9 3.5   CHLORIDE mmol/L 106 104   CO2 mmol/L 27 27   BUN mg/dL 9 11   CREATININE mg/dL 0.72 0.80   CALCIUM mg/dL 8.6 9.3   GLUCOSE mg/dL 83 88     Magnesium:    Troponin:    Results from last 7 days   Lab Units 03/16/25  1525 03/16/25  1333   TROPHS ng/L <3 <3     BNP:   Results from last 7 days   Lab Units 03/16/25  1333   BNP pg/mL 33     Lipid Panel:         Diagnostic Studies:   @No results found for this or any previous visit.    Results for orders placed in visit on 11/10/22    Echocardiogram    Narrative  [place] [address]  Tel [phone] Fax [phone]    TRANSTHORACIC ECHOCARDIOGRAM REPORT      Patient Name:     FITO BARRON Reading Physician:   [id number] Joaquim Ramirez DO  Study Date:       11/10/2022     Referring Physician: NAKUL ECHEVERRIA  MRN/PID:          [id number]       PCP:  Accession/Order#: [id number]   Department Location: [facility]  YOB: 1955       Fellow:  Gender:           F              Nurse:               Efra Patricio RN  Admit Date:       11/10/2022     Sonographer:         Monique Day RDCS  Admission Status: Outpatient     Additional Staff:  Height:           162.56 cm      CC Report to:  Weight:           55.34 kg       Study Type:          Echocardiogram  BSA:              1.59 m2  Blood Pressure: 122 /62 mmHg    Diagnosis/ICD: G45.8-Other transient cerebral ischemic attacks and related  syndromes  Indication:    PFO  Procedure/CPT: Echo Complete w Full Doppler-[id number]    Patient History:  Pertinent History: HTN, TIA and Dyslipidemia.    Study Detail: The following Echo studies were performed: 2D, M-Mode, Doppler and  color flow. Agitated saline used as a contrast agent for  intraseptal flow evaluation.      PHYSICIAN INTERPRETATION:  Left Ventricle: Left ventricular  systolic function is normal, with an estimated ejection fraction of 60%. There are no regional wall motion abnormalities. The left ventricular cavity size is normal. Spectral Doppler shows a normal pattern of left ventricular diastolic filling.  LV Wall Scoring:  All segments are normal.    Left Atrium: The left atrium is upper limits of normal in size. A bubble study using agitated saline was performed. Bubble study is negative.  Right Ventricle: The right ventricle is normal in size. There is normal right ventricular global systolic function.  Right Atrium: The right atrium is mildly dilated.  Aortic Valve: The aortic valve appears structurally normal. The aortic valve appears tricuspid. There is no evidence of aortic valve stenosis.  There is no evidence of aortic valve regurgitation. The peak instantaneous gradient of the aortic valve is 4.7 mmHg. The mean gradient of the aortic valve is 3.0 mmHg.  Mitral Valve: The mitral valve is normal in structure. There is no evidence of mitral valve stenosis. There is normal mitral valve leaflet mobility. There is mild mitral annular calcification. There is mild to moderate mitral valve regurgitation.  Tricuspid Valve: The tricuspid valve is structurally normal. There is normal tricuspid valve leaflet mobility. There is moderate tricuspid regurgitation.  Pulmonic Valve: The pulmonic valve is structurally normal. There is no indication of pulmonic valve regurgitation.  Pericardium: There is no pericardial effusion noted.  Aorta: The aortic root is normal.  Pulmonary Artery: The tricuspid regurgitant velocity is 3.04 m/s, and with an estimated right atrial pressure of 3 mmHg, the estimated pulmonary artery pressure is mild to moderately elevated with the RVSP at 40.0 mmHg.  Systemic Veins: The inferior vena cava appears to be of normal size.  In comparison to the previous echocardiogram(s): There are no prior studies on this patient for comparison  purposes.      CONCLUSIONS:  1. Left ventricular systolic function is normal with a 60% estimated ejection fraction.  2. There is no evidence of mitral valve stenosis.  3. Mild to moderate mitral valve regurgitation.  4. Moderate tricuspid regurgitation.  5. Aortic valve stenosis is not present.  6. Mild to moderately elevated pulmonary artery pressure.    RECOMMENDATIONS:  Transthoracic echo has low negative predictive value for mass, vegetation, or embolic source. Consider SHADI or MRI if clinically indicated.    QUANTITATIVE DATA SUMMARY:  2D MEASUREMENTS:  Normal Ranges:  Ao Root d:     2.60 cm   (2.0-3.7cm)  LAs:           2.70 cm   (2.7-4.0cm)  IVSd:          0.82 cm   (0.6-1.1cm)  LVPWd:         0.87 cm   (0.6-1.1cm)  LVIDd:         4.04 cm   (3.9-5.9cm)  LVIDs:         2.77 cm  LV Mass Index: 64.4 g/m2  LV % FS        31.4 %    LA VOLUME:  Normal Ranges:  LA Vol A4C:        26.3 ml    (22+/-6mL/m2)  LA Vol A2C:        23.4 ml  LA Vol BP:         25.4 ml  LA Vol Index A4C:  16.6ml/m2  LA Vol Index A2C:  14.8 ml/m2  LA Vol Index BP:   16.0 ml/m2  LA Area A4C:       11.4 cm2  LA Area A2C:       10.5 cm2  LA Major Axis A4C: 4.2 cm  LA Major Axis A2C: 4.0 cm  LA Volume Index:   14.5 ml/m2  LA Vol A4C:        25.0 ml  LA Vol A2C:        21.0 ml    RA VOLUME BY A/L METHOD:  Normal Ranges:  RA Vol A4C:        47.2 ml    (8.3-19.5ml)  RA Vol Index A4C:  29.8 ml/m2  RA Area A4C:       16.5 cm2  RA Major Axis A4C: 4.9 cm    M-MODE MEASUREMENTS:  Normal Ranges:  Ao Root: 2.90 cm (2.0-3.7cm)  AoV Exc: 2.20 cm (1.5-2.5cm)  LAs:     2.90 cm (2.7-4.0cm)    AORTA MEASUREMENTS:  Normal Ranges:  AoV Exc: 2.20 cm (1.5-2.5cm)    LV SYSTOLIC FUNCTION BY 2D PLANIMETRY (MOD):  Normal Ranges:  EF-A4C View: 62.9 % (>=55%)  EF-A2C View: 58.7 %  EF-Biplane:  60.2 %    LV DIASTOLIC FUNCTION:  Normal Ranges:  MV Peak E:        0.57 m/s   (0.7-1.2 m/s)  MV Peak A:        0.54 m/s   (0.42-0.7 m/s)  E/A Ratio:        1.05       (1.0-2.2)  MV  e'             0.10 m/s   (>8.0)  MV lateral e'     0.10 m/s  MV medial e'      0.07 m/s  E/e' Ratio:       5.77       (<8.0)  PulmV Sys Cash:    81.90 cm/s  PulmV Desir Cash:   54.80 cm/s  PulmV S/D Cash:    1.50  PulmV A Revs Cash: 32.10 cm/s  PulmV A Revs Dur: 87.00 msec    MITRAL VALVE:  Normal Ranges:  MV DT: 314 msec (150-240msec)    AORTIC VALVE:  Normal Ranges:  AoV Vmax:                1.08 m/s (<=1.7m/s)  AoV Peak P.7 mmHg (<20mmHg)  AoV Mean PG:             3.0 mmHg (1.7-11.5mmHg)  LVOT Max Cash:            0.78 m/s (<=1.1m/s)  AoV VTI:                 22.10 cm (18-25cm)  LVOT VTI:                15.70 cm  LVOT Diameter:           2.00 cm  (1.8-2.4cm)  AoV Area, VTI:           2.23 cm2 (2.5-5.5cm2)  AoV Area,Vmax:           2.27 cm2 (2.5-4.5cm2)  AoV Dimensionless Index: 0.71    RIGHT VENTRICLE:  RV 1   3.9 cm  RV 2   3.0 cm  RV 3   6.1 cm  TAPSE: 24.0 mm  RV s'  0.14 m/s    TRICUSPID VALVE/RVSP:  Normal Ranges:  Peak TR Velocity: 3.04 m/s  RV Syst Pressure: 40.0 mmHg (< 30mmHg)    PULMONIC VALVE:  Normal Ranges:  PV Accel Time: 108 msec (>120ms)  PV Max Cash:    0.8 m/s  (0.6-0.9m/s)  PV Max P.3 mmHg    Pulmonary Veins:  PulmV A Revs Dur: 87.00 msec  PulmV A Revs Cash: 32.10 cm/s  PulmV Desir Cash:   54.80 cm/s  PulmV S/D Cash:    1.50  PulmV Sys Cash:    81.90 cm/s      [id number] Joaquim Ramirez DO  Electronically signed on 11/10/2022 at 12:02:21 PM      Wall Scoring                  Radiology:     XR cervical spine complete 4-5 views   Final Result   Degenerative changes as detailed above. No acute fracture or   subluxation.             MACRO:   None        Signed by: Joseph Schoenberger 3/17/2025 8:42 AM   Dictation workstation:   [id number]      CT angio chest abdomen pelvis   Final Result   No evidence of thoracic or abdominal aorta dissection or aneurysm. No   acute abnormality in the chest, abdomen and pelvis to explain   patient's chest pain and shortness of breath. Additional chronic  and   incidental findings as detailed above.        MACRO:   None        Signed by: Samira Cummins 3/16/2025 4:16 PM   Dictation workstation:   [id number]      XR chest 1 view   Final Result   1.  No evidence of acute cardiopulmonary process.                  MACRO:   None        Signed by: Johnnie Reina 3/16/2025 2:22 PM   Dictation workstation:   [id number]      Transthoracic Echo (TTE) Complete    (Results Pending)       Problem List:     Patient Active Problem List   Diagnosis    Degenerative arthritis of lumbar spine    Degenerative joint disease of hand    Dyslipidemia    Generalized anxiety disorder    HTN (hypertension)    Insomnia    Stress    Vitamin D deficiency    Arrhythmia    Carotid stenosis    TIA (transient ischemic attack)    Recurrent rhinosinusitis    Osteoporosis    Vision loss of left eye    Vasomotor symptoms due to menopause    Intractable migraine without aura and without status migrainosus    Inner ear inflammation, left    Gastroenteritis    Left ear pain    Tear of right rotator cuff    Chest pain       Assessment:   69-year-old female seen evaluate the bedside in conjunction with Twin Erickson RN, CNP in the telemetry unit.    Bedside examination evaluation and interview were performed by me.    Chart reviewed in detail including lab, EKG, x-rays, prior studies and discussed with the patient staff and family.    Impression:  Atypical chest pain  Mild coronary calcifications of the aorta  Hypertension  Hyperlipidemia  Carotid stenosis  Remote history of TIA  Former nicotine  Chronic neck pain      Plan:   Recommendation:  Admitted to medicine.  Remains on telemetry.  Telemetry shows normal sinus rhythm.  Reviewed EKGs from the emergency department which showed nonspecific ST wave abnormalities.  No STEMI criteria.  Remains in normal sinus rhythm.  Continue supplemental O2  Monitor electrolytes, keep potassium greater than 4 and magnesium greater than 2  Check echocardiogram to  assess for any wall motion or valvular abnormality  Low suspicion for ACS however patient has consistent symptoms and story of ischemia.  Discussed options moving forward including noninvasive stress testing versus left heart catheterization.  She did elect to undergo left heart catheterization due to symptoms listed above.  Troponins remain negative.  EKGs are nonspecific.  Risk and benefits were discussed in regards to left heart catheterization.  [facility] today   Reviewed lipid panel from December 2024, unremarkable  Continue aspirin, statin and beta-blocker  Recommend orthopedics evaluation for chronic neck pain  Carotid ultrasound done in 2022 showed mild bilateral carotid plaque with no stenosis greater than 50%.  Further recommendations to follow      Discussion:  Is a 69-year-old female with no prior cardiovascular history except some mild to moderate valvular regurgitation on a remote echo.  LV function the past been normal.  Patient's had some atypical symptoms.  She came to the hospital.  Her labs and EKGs thus far have been unremarkable.  She is still symptomatic and she and her  are both concerned about potential underlying heart disease.  Under the circumstances be best suited for the patient undergo left heart catheterization.  She does have plaque identified on x-ray findings along with prior carotid studies.  Undoubtably has ASHD at least to a mild degree but whether or not there is obstructive disease requiring intervention is to be determined.  I reviewed all risk complication alternatives with the patient and family and all are agreeable.  We have discussed this with the interventional team and they are prepared to take her to the lab today to proceed.  Disposition will follow.      Joaquim Ramirez DO,FACC        [facility]  Adult Gerontology Acute Care Nurse Practitioner  [facility] [place]   [facility]  [place]  [phone]      Of note, this documentation is completed using the Dragon Dictation system (voice recognition software). There may be spelling and/or grammatical errors that were not corrected prior to final submission.      Electronically signed by Joaquim Ramirez DO on 3/17/2025 at 9:56 AM

## 2025-03-17 NOTE — DISCHARGE INSTRUCTIONS
CARDIAC CATHETERIZATION DISCHARGE INSTRUCTIONS     FOR SUDDEN AND SEVERE CHEST PAIN, SHORTNESS OF BREATH, EXCESSIVE BLEEDING, SIGNS OF STROKE, OR CHANGES IN MENTAL STATUS YOU SHOULD CALL 911 IMMEDIATELY.     Your provider has prescribed aspirin-DO NOT STOP THESE MEDICATIONS for any reason without talking to your cardiologist first. If any of these were prescribed, you must take them every day without missing a single dose. If you are getting low on these medications, contact your provider immediately for a refill.     FOR NEXT 24 HOURS  - Upon discharge, you should return home and rest for the remainder of the day and evening. You do not have to stay on bed rest but should not be very active.  It is recommended a responsible adult be with you for the first 24 hours after the procedure.    - No driving for 24 hours after procedure. Please arrange for someone to drive you home from the hospital today.     - Do not drive, operate machinery, or use power tools for 24 hours after your procedure.     - Do not make any legal decisions for 24 hours after your procedure.     - Do not drink alcoholic beverages for 24 hours after your procedure.    WOUND CARE-RESTRICTIONS ARE FOR THE NEXT 3 FULL DAYS WITH YOUR RIGHT HAND/WRIST   *FOR RADIAL (WRIST) ACCESS*  ·      No lifting, pushing or pulling  more than 5 pounds or excessive use of the wrist- for example, treat your wrist as if it is sprained.  ·      Do not engage in vigorous activities (groceries, pets, vacuum etc) for at least 72hours after the procedure.  ·      Do not submerge the wrist for 7 days after the procedure.  ·      You should expect mild tingling in your hand and tenderness at the puncture site for up to 3 days.    - The transparent dressing should be removed from the site 24 hours after the procedure.  Wash the site gently with soap and water. Rinse well and pat dry. Keep the area clean and dry. You may apply a Band-Aid to the site. Avoid lotions,  ointments, or powders until fully healed.     - You may shower the day after your procedure.      - It is normal to notice a small bruise around the puncture site and/or a small grape sized or smaller lump. Any large bruising or large lump warrants a call to the office.     - If bleeding should occur, lay down and apply pressure to the affected area for 10 minutes.  If the bleeding stops notify your physician.  If there is a large amount of bleeding or spurting of blood CALL 911 immediately.  DO NOT drive yourself to the hospital.    **You may experience some tenderness, bruising or minimal inflammation.  If you have any concerns, contact your cardiologist or go to the emergency room.     OTHER INSTRUCTIONS  - You may take acetaminophen (Tylenol) as directed for discomfort.  If pain is not relieved with acetaminophen (Tylenol), contact your doctor.    - If you notice or experience any of the following, you should notify your doctor or seek medical attention  Chest pain or discomfort  Change in mental status or weakness in extremities.  Dizziness, light headedness, or feeling faint.  Change in the site where the procedure was performed, such as bleeding or an increased area of bruising or swelling.  Tingling, numbness, pain, or coolness in the leg/arm beyond the site where the procedure was performed.  Signs of infection (i.e. shaking chills, temperature > 100 degrees Fahrenheit, warmth, redness) in the leg/arm area where the procedure was performed.  Changes in urination   Bloody or black stools  Vomiting blood  Severe nose bleeds  Any excessive bleeding

## 2025-03-17 NOTE — POST-PROCEDURE NOTE
Physician Transition of Care Summary  Invasive Cardiovascular Lab    Procedure Date: 3/17/2025  Attending:    * Charlotte Munoz - Primary  Resident/Fellow/Other Assistant: Surgeons and Role:  * No surgeons found with a matching role *    Indications:   Pre-op Diagnosis      * Chest pain, unspecified type [R07.9]     * Age-related osteoporosis without current pathological fracture [M81.0]     * Cardiac arrhythmia due to premature depolarization, unspecified type [I49.40]     * Primary hypertension [I10]    Post-procedure diagnosis:   Post-op Diagnosis     * Chest pain, unspecified type [R07.9]     * Age-related osteoporosis without current pathological fracture [M81.0]     * Cardiac arrhythmia due to premature depolarization, unspecified type [I49.40]     * Primary hypertension [I10]    Procedure(s):   Left Heart Cath, With LV  54241 - MS CATH PLMT L HRT & ARTS W/NJX & ANGIO IMG S&I        Procedure Findings:       Description of the Procedure:       Complications:   None    Stents/Implants:   Implants       No implant documentation for this case.            Anticoagulation/Antiplatelet Plan:   None    Estimated Blood Loss:   * No values recorded between 3/17/2025 12:00 AM and 3/17/2025  1:35 PM *    Anesthesia: Moderate Sedation Anesthesia Staff: No anesthesia staff entered.    Any Specimen(s) Removed:   No specimens collected during this procedure.    Disposition:         Electronically signed by: Charlotte Munoz MD, 3/17/2025 1:35 PM

## 2025-03-17 NOTE — NURSING NOTE
"Pt given yellow folder with \"Wound Care for Arterial Puncture Care\" printed instructions which was reviewed with pt and spouse.  Informed that she will receive more instructions for wound care at discharge. Radial site s/s of bleeding/hematoma reinforced along with R radial/hand restrictions.  Pt stated understanding.  "

## 2025-03-17 NOTE — NURSING NOTE
Patient returned from recovery after LHC.  Patient awake and oriented.   Patient denies pain and right radial cath site dressed with guaze and covered with opsite.

## 2025-03-17 NOTE — NURSING NOTE
Vascband removed without issue.  R radial site soft. Ecchymotic, no s/s of bleeding or hematoma noted. Folded gauze and transparent drsg applied with palpable R radial pulse noted. S/s of radial site bleeding/hematoma and wrist/hand restrictions reviewed and reinforce with pt with spouse at bedside. Pt verbalized understanding.

## 2025-03-17 NOTE — PROGRESS NOTES
Patient is stable status post LHC under the care of Dr. Munoz.  Discussed results of procedure with patient and her family member.  Pictures provided.  Findings of the LHC revealed normal coronary arteries and a normal LVEF.  Medical management is advised.  Patient can be discharged from a procedural/cardiology standpoint later this afternoon barring no complications.  Postprocedural activity, restrictions and potential complications discussed at length.  All questions answered.  Both verbalized understanding.

## 2025-03-17 NOTE — POST-PROCEDURE NOTE
Physician Transition of Care Summary  Invasive Cardiovascular Lab    Procedure Date: 3/17/2025  Attending:    * Charlotte Munoz - Primary  Resident/Fellow/Other Assistant: Surgeons and Role:  * No surgeons found with a matching role *    Indications:   Pre-op Diagnosis      * Chest pain, unspecified type [R07.9]     * Age-related osteoporosis without current pathological fracture [M81.0]     * Cardiac arrhythmia due to premature depolarization, unspecified type [I49.40]     * Primary hypertension [I10]    Post-procedure diagnosis:   Post-op Diagnosis     * Chest pain, unspecified type [R07.9]     * Age-related osteoporosis without current pathological fracture [M81.0]     * Cardiac arrhythmia due to premature depolarization, unspecified type [I49.40]     * Primary hypertension [I10]    Procedure(s):   Left Heart Cath, With LV  71756 - IN CATH PLMT L HRT & ARTS W/NJX & ANGIO IMG S&I        Procedure Findings:   Normal coronaries, normal left ventricular function    Description of the Procedure:   Left heart catheterization coronary angiography left ventricular    Complications:   None    Stents/Implants:   Implants       No implant documentation for this case.            Anticoagulation/Antiplatelet Plan:   Intravenous heparin    Estimated Blood Loss:   * No values recorded between 3/17/2025  1:58 PM and 3/17/2025  2:14 PM *    Anesthesia: Moderate Sedation Anesthesia Staff: No anesthesia staff entered.    Any Specimen(s) Removed:   No specimens collected during this procedure.    Disposition:   Medical therapy      Electronically signed by: Charlotte Munoz MD, 3/17/2025 2:14 PM

## 2025-03-18 ENCOUNTER — PATIENT OUTREACH (OUTPATIENT)
Dept: PRIMARY CARE | Facility: CLINIC | Age: 70
End: 2025-03-18
Payer: MEDICARE

## 2025-03-21 ENCOUNTER — HOSPITAL ENCOUNTER (OUTPATIENT)
Dept: CARDIOLOGY | Facility: HOSPITAL | Age: 70
Discharge: HOME | End: 2025-03-21
Payer: MEDICARE

## 2025-03-21 PROCEDURE — 93005 ELECTROCARDIOGRAM TRACING: CPT

## 2025-03-22 LAB
ATRIAL RATE: 68 BPM
P AXIS: 52 DEGREES
P OFFSET: 210 MS
P ONSET: 162 MS
PR INTERVAL: 132 MS
Q ONSET: 228 MS
QRS COUNT: 11 BEATS
QRS DURATION: 68 MS
QT INTERVAL: 404 MS
QTC CALCULATION(BAZETT): 429 MS
QTC FREDERICIA: 421 MS
R AXIS: 17 DEGREES
T AXIS: 12 DEGREES
T OFFSET: 430 MS
VENTRICULAR RATE: 68 BPM

## 2025-03-23 LAB
ALBUMIN SERPL-MCNC: 4 G/DL (ref 3.6–5.1)
ALP SERPL-CCNC: 84 U/L (ref 37–153)
ALT SERPL-CCNC: 16 U/L (ref 6–29)
ANION GAP SERPL CALCULATED.4IONS-SCNC: 8 MMOL/L (CALC) (ref 7–17)
AST SERPL-CCNC: 25 U/L (ref 10–35)
BILIRUB SERPL-MCNC: 0.6 MG/DL (ref 0.2–1.2)
BUN SERPL-MCNC: 11 MG/DL (ref 7–25)
CALCIUM SERPL-MCNC: 9.4 MG/DL (ref 8.6–10.4)
CHLORIDE SERPL-SCNC: 105 MMOL/L (ref 98–110)
CHOLEST SERPL-MCNC: 175 MG/DL
CHOLEST/HDLC SERPL: 2.6 (CALC)
CO2 SERPL-SCNC: 30 MMOL/L (ref 20–32)
CREAT SERPL-MCNC: 0.9 MG/DL (ref 0.5–1.05)
EGFRCR SERPLBLD CKD-EPI 2021: 69 ML/MIN/1.73M2
GLUCOSE SERPL-MCNC: 84 MG/DL (ref 65–99)
HDLC SERPL-MCNC: 68 MG/DL
LDLC SERPL CALC-MCNC: 88 MG/DL (CALC)
NONHDLC SERPL-MCNC: 107 MG/DL (CALC)
POTASSIUM SERPL-SCNC: 4.1 MMOL/L (ref 3.5–5.3)
PROT SERPL-MCNC: 6.7 G/DL (ref 6.1–8.1)
SODIUM SERPL-SCNC: 143 MMOL/L (ref 135–146)
TRIGL SERPL-MCNC: 92 MG/DL

## 2025-03-25 ENCOUNTER — APPOINTMENT (OUTPATIENT)
Dept: PHYSICAL THERAPY | Facility: CLINIC | Age: 70
End: 2025-03-25
Payer: MEDICARE

## 2025-03-26 DIAGNOSIS — G45.9 TIA (TRANSIENT ISCHEMIC ATTACK): Primary | ICD-10-CM

## 2025-03-27 NOTE — PROGRESS NOTES
I reviewed the progress note and agree with the resident’s findings and plans as written. Case discussed with resident.    Cathleen Higgins, PharmD

## 2025-04-01 ENCOUNTER — APPOINTMENT (OUTPATIENT)
Dept: PRIMARY CARE | Facility: CLINIC | Age: 70
End: 2025-04-01
Payer: MEDICARE

## 2025-04-01 VITALS
RESPIRATION RATE: 12 BRPM | DIASTOLIC BLOOD PRESSURE: 80 MMHG | HEART RATE: 63 BPM | OXYGEN SATURATION: 98 % | BODY MASS INDEX: 21.17 KG/M2 | WEIGHT: 124 LBS | SYSTOLIC BLOOD PRESSURE: 114 MMHG | HEIGHT: 64 IN

## 2025-04-01 DIAGNOSIS — S46.011D TRAUMATIC INCOMPLETE TEAR OF RIGHT ROTATOR CUFF, SUBSEQUENT ENCOUNTER: ICD-10-CM

## 2025-04-01 DIAGNOSIS — H34.8120 CENTRAL RETINAL VEIN OCCLUSION, LEFT EYE, WITH MACULAR EDEMA: ICD-10-CM

## 2025-04-01 DIAGNOSIS — I10 PRIMARY HYPERTENSION: Primary | ICD-10-CM

## 2025-04-01 DIAGNOSIS — E78.5 DYSLIPIDEMIA: ICD-10-CM

## 2025-04-01 PROCEDURE — G2211 COMPLEX E/M VISIT ADD ON: HCPCS | Performed by: FAMILY MEDICINE

## 2025-04-01 PROCEDURE — 99213 OFFICE O/P EST LOW 20 MIN: CPT | Performed by: FAMILY MEDICINE

## 2025-04-01 ASSESSMENT — ENCOUNTER SYMPTOMS
HEADACHES: 0
SINUS PRESSURE: 0
LOSS OF SENSATION IN FEET: 0
EYE PAIN: 0
OCCASIONAL FEELINGS OF UNSTEADINESS: 0
NERVOUS/ANXIOUS: 0
SEIZURES: 0
DYSURIA: 0
SHORTNESS OF BREATH: 0
PHOTOPHOBIA: 0
COUGH: 0
STRIDOR: 0
ABDOMINAL DISTENTION: 0
APPETITE CHANGE: 0
ARTHRALGIAS: 0
COLOR CHANGE: 0
POLYPHAGIA: 0
FLANK PAIN: 0
MYALGIAS: 0
DIARRHEA: 0
TROUBLE SWALLOWING: 0
AGITATION: 0
ABDOMINAL PAIN: 0
CONSTITUTIONAL NEGATIVE: 1
POLYDIPSIA: 0
DIZZINESS: 0
SORE THROAT: 0
NECK STIFFNESS: 0
CONFUSION: 0
SPEECH DIFFICULTY: 0
RHINORRHEA: 0
PALPITATIONS: 0
DEPRESSION: 0
BLOOD IN STOOL: 0
RECTAL PAIN: 0
DECREASED CONCENTRATION: 0
ADENOPATHY: 0
FEVER: 0
ACTIVITY CHANGE: 0
CHEST TIGHTNESS: 0
SLEEP DISTURBANCE: 0
HEMATURIA: 0
CONSTIPATION: 0
SINUS PAIN: 0
FATIGUE: 0
DYSPHORIC MOOD: 0

## 2025-04-01 ASSESSMENT — PATIENT HEALTH QUESTIONNAIRE - PHQ9
2. FEELING DOWN, DEPRESSED OR HOPELESS: NOT AT ALL
SUM OF ALL RESPONSES TO PHQ9 QUESTIONS 1 AND 2: 0
1. LITTLE INTEREST OR PLEASURE IN DOING THINGS: NOT AT ALL

## 2025-04-01 NOTE — PROGRESS NOTES
Subjective   Patient ID: Tonie Polanco is a 69 y.o. female who presents for Hospital Follow-up (Admitted on 03/16/2025-03/17/2025 at [facility]. Had heart cath completed. Patient needs a letter stating that she can return to physical therapy following this procedure. Denies any chest pain or shortness of breath. ) and Results (Lab results from 03/22/2025).  History of Present Illness  Tonie Polanco is a 69 year old female who presents with chest pain and sweating.    She experiences chest pain radiating down her arm and back, particularly during activities such as climbing stairs or vacuuming, which also cause profuse sweating. She describes the sweating as feeling like she 'just got out of the shower.' A past cardiac catheterization was normal, and a CT scan a year ago showed minimal calcification in her coronary arteries. She is not on medication for anxiety or stress.    She takes aspirin 81 mg daily, Torsemide 25 mg once a day, and uses Flonase nasal spray as needed. She does not use Prolia injections due to personal preference.    She had rotator cuff surgery and is considering returning to physical therapy for her shoulder. She has been doing exercises at home and had one session of therapy before experiencing chest pains.    She has a sore on the inside of her left lower leg, which developed after bumping it. A blood blister formed and has since decreased over the past three weeks. She uses wet to dry dressings and saline to promote healing.    She had a hysterectomy years ago and experiences stress due to work and family responsibilities. She plans to retire at 71 and is currently helping care for her grandchildren while her daughter works.    No pain or swelling in her legs, except for the sore on her left lower leg. She sleeps okay at night.    Review of Systems   Constitutional: Negative.  Negative for activity change, appetite change, fatigue and fever.   HENT:  Negative for congestion, dental problem, ear  "discharge, ear pain, mouth sores, rhinorrhea, sinus pressure, sinus pain, sore throat, tinnitus and trouble swallowing.    Eyes:  Negative for photophobia, pain and visual disturbance.   Respiratory:  Negative for cough, chest tightness, shortness of breath and stridor.    Cardiovascular:  Negative for chest pain and palpitations.   Gastrointestinal:  Negative for abdominal distention, abdominal pain, blood in stool, constipation, diarrhea and rectal pain.   Endocrine: Negative for cold intolerance, heat intolerance, polydipsia, polyphagia and polyuria.   Genitourinary:  Negative for dysuria, flank pain, hematuria and urgency.   Musculoskeletal:  Negative for arthralgias, gait problem, myalgias and neck stiffness.   Skin:  Negative for color change and rash.   Allergic/Immunologic: Negative for environmental allergies and food allergies.   Neurological:  Negative for dizziness, seizures, syncope, speech difficulty and headaches.   Hematological:  Negative for adenopathy.   Psychiatric/Behavioral:  Negative for agitation, confusion, decreased concentration, dysphoric mood and sleep disturbance. The patient is not nervous/anxious.        Objective     /80 (BP Location: Right arm, Patient Position: Sitting, BP Cuff Size: Adult)   Pulse 63   Resp 12   Ht 1.626 m (5' 4\")   Wt 56.2 kg (124 lb)   SpO2 98%   BMI 21.28 kg/m²      Physical Exam  GENERAL: Alert, cooperative, well developed, no acute distress.  HEENT: Normocephalic, normal oropharynx, moist mucous membranes.  CHEST: Clear to auscultation bilaterally, no wheezes, rhonchi, or crackles.  CARDIOVASCULAR: Normal heart rate and rhythm, S1 and S2 normal without murmurs.  ABDOMEN: Soft, non-tender, non-distended, without organomegaly, normal bowel sounds.  EXTREMITIES: No cyanosis or edema.  NEUROLOGICAL: Cranial nerves grossly intact, moves all extremities without gross motor or sensory deficit.    Assessment & Plan  Chest Pain  Intermittent chest pain " with associated arm and back pain. Recent cardiac catheterization and CT scan showed normal coronary arteries and a left ventricular ejection fraction of 55%, indicating normal cardiac function. Symptoms are likely stress-related as cardiac causes have been ruled out. Vasospasm and anxiety are potential contributing factors. She is not on any medication for anxiety and declined pharmacological stress management. Non-pharmacological options such as black cohosh, primrose oil, ashwagandha, and valerian root were discussed as alternatives.  - Continue current medications: aspirin 81 mg daily, to norming 25 mg once daily, acetylenza nasal spray, Flonase nasal spray as needed, Zocortan  - Discuss non-pharmacological stress management techniques and consider medication for anxiety if needed in the future    Rotator Cuff Repair  Post-operative status following rotator cuff surgery. She reports performing home exercises and had one session of physical therapy before experiencing chest pain. She is reluctant to continue physical therapy but acknowledges its benefits. A home exercise program was provided by Doctor Ervin.  - Provide referral for physical therapy for shoulder rehabilitation  - Encourage continuation of home exercise program provided by Doctor Ervin    Skin Lesion on Left Lower Leg  Skin lesion on the left lower leg following a bump, resulting in a blood blister. Present for about three weeks and healing slowly due to scar tissue. No signs of infection. Advised to use wet to dry dressings to promote granulation.  - Advise use of wet to dry dressings to promote granulation  - Continue current care with saline soaks and avoid picking at the lesion    General Health Maintenance  Immunizations up to date, including flu, pneumonia, RSV, and COVID-19 vaccines. Recent lab work shows normal cholesterol levels, kidney function, liver function, and blood count. Discussed stress management and lifestyle modifications.  She is planning vacations in May and August, which may help with stress management.  - Continue current immunization schedule  - Encourage stress management techniques and lifestyle modifications  - Discuss natural remedies for stress and anxiety such as black cohosh, primrose oil, ashwagandha, and valerian root    Results  Procedure: Wound care  Description: Wet to dry dressings applied to the left lower leg. The area was soaked with saline to promote granulation.    LABS  Cholesterol: 175 (03/22/2025)  HDL: 68 (03/22/2025)  LDL: 88 (03/22/2025)  Triglycerides: <150 (03/22/2025)  Total cholesterol/HDL ratio: 2.6 (03/22/2025)  Glucose: 84 (03/22/2025)  BUN: 11 (03/22/2025)  Creatinine: 0.9 (03/22/2025)  GFR: 69 (03/22/2025)  Sodium: 143 (03/22/2025)  Potassium: 4.1 (03/22/2025)  WBC: 5.9 (03/17/2025)  Hb: 13.1 (03/17/2025)  Hematocrit: 39.9% (03/17/2025)    RADIOLOGY  Cardiac CT: Cardiac score zero on left main, left anterior descending, left circumflex, and right coronary arteries; minimal calcification in coronary arteries (03/2024)    DIAGNOSTIC  Cardiac catheterization: Left main coronary normal, left anterior descending normal, left circumflex normal, right coronary normal, left ventricular ejection fraction 55%       Mathew Batista,      This medical note was created with the assistance of artificial intelligence (AI) for documentation purposes. The content has been reviewed and confirmed by the healthcare provider for accuracy and completeness. Patient consented to the use of audio recording and use of AI during their visit.

## 2025-04-02 ENCOUNTER — TREATMENT (OUTPATIENT)
Dept: PHYSICAL THERAPY | Facility: CLINIC | Age: 70
End: 2025-04-02
Payer: MEDICARE

## 2025-04-02 DIAGNOSIS — M25.511 RIGHT SHOULDER PAIN, UNSPECIFIED CHRONICITY: ICD-10-CM

## 2025-04-02 DIAGNOSIS — M75.101 TEAR OF RIGHT ROTATOR CUFF, UNSPECIFIED TEAR EXTENT, UNSPECIFIED WHETHER TRAUMATIC: Primary | ICD-10-CM

## 2025-04-02 PROCEDURE — 97110 THERAPEUTIC EXERCISES: CPT | Mod: GP

## 2025-04-02 NOTE — PROGRESS NOTES
Patient Name: Tonie Polanco  MRN: [id number]  Time Calculation  Start Time: 1548  Stop Time: 1605  Time Calculation (min): 17 min     PT Therapeutic Procedures Time Entry  Therapeutic Exercise Time Entry: 17                     Current Problem  1. Tear of right rotator cuff, unspecified tear extent, unspecified whether traumatic        2. Right shoulder pain, unspecified chronicity            Insurance    MEDICARE/MMO 2410($0 USED) COPAY 0 (MET)  COVERAGE 80/100 OOP 0 MMO TO FOLLOW MEDICARE  NO AUTH REQ [id number]/ALL     Subjective   Abbriged session today s/t pt arriving early for her original appointment with a different therapist at 4:15PM. She was agreeable to move to this PT's opening with knowledge that session would be shortened.   General  Pt reports her shoulder is doing very well. States she has not had any issues since initial evaluation on 3/13.     Pt had a heart cath on 3/17 s/t pains down her left arm, no significant findings. She was cleared yesterday by her PCP     Precautions  R RCR Dr. VANESSA Galo 1/14/25- P/A/AROM, no resistance until 4/15/25 per referral  -cleared for AROM strengthening by Paresh Nye via secure chat on 3/13/25- no resistance   Pain  0/10    Objective     Treatments:    Wall slides:   x15 abd/flex  ER iso w/flexion wall slide:  x15  Ball on the wall :  2x15 CW/CCW, at 90 and 180 degrees horizontal abduction   Serratus wall slides:  x10     OP EDUCATION/HEP:  Discussed that we will cancel her next visit s/t progress as we await clearance to strengthen with resistance. Pt agreeable to this and will be independent with her home program in the meantime.   Access Code: [email or web address]  URL: [email or web address]  Date: 04/02/2025  Prepared by: John Banks    Exercises  - Standing Wall Ball Circles in Scaption with Mini Swiss Ball  - 1 x daily - 5-7 x weekly - 2 sets - 10-15 reps  - Standing Wall Ball Circles with Mini Swiss Ball  - 1 x daily - 5-7 x weekly - 2 sets  - 10-15 reps  - Shoulder Horizontal Abduction Flexion Wall Slide  - 1 x daily - 5-7 x weekly - 2-3 sets - 10-15 reps  - Serratus Activation at Wall  - 1 x daily - 5-7 x weekly - 2-3 sets - 10 reps  Assessment   Pt seen for abridged session today s/t arriving early and demonstrating excellent progress within current restrictions. She is approaching full and symmetrical ROM within flexion and scaption. Pt reports near full independence with current home program, which is nearly comprehensive for current restrictions post-operatively. Discussed that we will cancel her next visit before following up after likely clearance for full strengthening. Updated her HEP with several more active range exercises, pt tolerated them well within today's visit. Recommend continued skilled PT to progress strengthening as appropriate in order to restore PLOF.     Plan     Continue per POC. S/t pt progress will cancel her next visit to be independent with her HEP until cleared for resistance/strengthening (likely after 4/14)

## 2025-04-06 NOTE — PATIENT INSTRUCTIONS
VISIT SUMMARY:  During your visit, we discussed your chest pain and sweating, your recovery from rotator cuff surgery, and the sore on your left lower leg. We also reviewed your general health maintenance, including your immunizations and recent lab results.    YOUR PLAN:  -CHEST PAIN: Your chest pain, which radiates to your arm and back and is accompanied by sweating, is likely related to stress, as cardiac causes have been ruled out. We discussed non-pharmacological options for managing stress, such as black cohosh, primrose oil, ashwagandha, and valerian root. Continue taking your current medications: aspirin 81 mg daily, Torsemide 25 mg once daily, and Flonase nasal spray as needed. If needed, we can consider medication for anxiety in the future.    -ROTATOR CUFF REPAIR: You are recovering from rotator cuff surgery and have been doing home exercises. Although you experienced chest pain after one session of physical therapy, it is important to continue with physical therapy for optimal recovery. A referral for physical therapy has been provided, and you should continue the home exercise program given by Doctor Ervin.    -SKIN LESION ON LEFT LOWER LEG: The sore on your left lower leg, which developed after a bump, is healing slowly. There are no signs of infection. Continue using wet to dry dressings and saline soaks to promote healing, and avoid picking at the lesion.    -GENERAL HEALTH MAINTENANCE: Your immunizations are up to date, and your recent lab work shows normal cholesterol levels, kidney function, liver function, and blood count. We discussed stress management techniques and lifestyle modifications, which are important for your overall well-being. You are planning vacations in May and August, which may help with stress management. Continue with your current immunization schedule and consider natural remedies for stress and anxiety, such as black cohosh, primrose oil, ashwagandha, and valerian  root.    INSTRUCTIONS:  Please follow up with physical therapy for your shoulder rehabilitation and continue with the home exercise program. If your chest pain persists or worsens, or if you have any concerns about your stress levels, please contact our office. Continue with the care for your skin lesion as advised, and avoid picking at it. Enjoy your planned vacations, and remember to practice stress management techniques. VISIT SUMMARY:  During your visit, we discussed your chest pain and sweating, your recovery from rotator cuff surgery, and the sore on your left lower leg. We also reviewed your general health maintenance, including your immunizations and recent lab results.    YOUR PLAN:  -CHEST PAIN: Your chest pain, which radiates to your arm and back and is accompanied by sweating, is likely related to stress, as cardiac causes have been ruled out. We discussed non-pharmacological options for managing stress, such as black cohosh, primrose oil, ashwagandha, and valerian root. Continue taking your current medications: aspirin 81 mg daily, Torsemide 25 mg once daily, and Flonase nasal spray as needed. If needed, we can consider medication for anxiety in the future.    -ROTATOR CUFF REPAIR: You are recovering from rotator cuff surgery and have been doing home exercises. Although you experienced chest pain after one session of physical therapy, it is important to continue with physical therapy for optimal recovery. A referral for physical therapy has been provided, and you should continue the home exercise program given by Doctor Ervin.    -SKIN LESION ON LEFT LOWER LEG: The sore on your left lower leg, which developed after a bump, is healing slowly. There are no signs of infection. Continue using wet to dry dressings and saline soaks to promote healing, and avoid picking at the lesion.    -GENERAL HEALTH MAINTENANCE: Your immunizations are up to date, and your recent lab work shows normal cholesterol levels,  kidney function, liver function, and blood count. We discussed stress management techniques and lifestyle modifications, which are important for your overall well-being. You are planning vacations in May and August, which may help with stress management. Continue with your current immunization schedule and consider natural remedies for stress and anxiety, such as black cohosh, primrose oil, ashwagandha, and valerian root.    INSTRUCTIONS:  Please follow up with physical therapy for your shoulder rehabilitation and continue with the home exercise program. If your chest pain persists or worsens, or if you have any concerns about your stress levels, please contact our office. Continue with the care for your skin lesion as advised, and avoid picking at it. Enjoy your planned vacations, and remember to practice stress management techniques.

## 2025-04-08 ENCOUNTER — CLINICAL SUPPORT (OUTPATIENT)
Dept: PRIMARY CARE | Facility: CLINIC | Age: 70
End: 2025-04-08
Payer: MEDICARE

## 2025-04-08 DIAGNOSIS — J30.1 SEASONAL ALLERGIC RHINITIS DUE TO POLLEN: ICD-10-CM

## 2025-04-08 PROCEDURE — 96372 THER/PROPH/DIAG INJ SC/IM: CPT | Performed by: FAMILY MEDICINE

## 2025-04-08 RX ORDER — TRIAMCINOLONE ACETONIDE 40 MG/ML
80 INJECTION, SUSPENSION INTRA-ARTICULAR; INTRAMUSCULAR ONCE
Status: DISCONTINUED | OUTPATIENT
Start: 2025-04-08 | End: 2025-04-10

## 2025-04-08 RX ADMIN — TRIAMCINOLONE ACETONIDE 80 MG: 40 INJECTION, SUSPENSION INTRA-ARTICULAR; INTRAMUSCULAR at 08:47

## 2025-04-09 ENCOUNTER — APPOINTMENT (OUTPATIENT)
Dept: PHYSICAL THERAPY | Facility: CLINIC | Age: 70
End: 2025-04-09
Payer: MEDICARE

## 2025-04-10 RX ORDER — TRIAMCINOLONE ACETONIDE 40 MG/ML
80 INJECTION, SUSPENSION INTRA-ARTICULAR; INTRAMUSCULAR ONCE
Status: COMPLETED | OUTPATIENT
Start: 2025-04-08 | End: 2025-04-08

## 2025-04-14 ENCOUNTER — OFFICE VISIT (OUTPATIENT)
Dept: ORTHOPEDIC SURGERY | Facility: CLINIC | Age: 70
End: 2025-04-14
Payer: MEDICARE

## 2025-04-14 DIAGNOSIS — M75.101 TEAR OF RIGHT ROTATOR CUFF, UNSPECIFIED TEAR EXTENT, UNSPECIFIED WHETHER TRAUMATIC: Primary | ICD-10-CM

## 2025-04-14 PROCEDURE — 1123F ACP DISCUSS/DSCN MKR DOCD: CPT | Performed by: ORTHOPAEDIC SURGERY

## 2025-04-14 PROCEDURE — 99024 POSTOP FOLLOW-UP VISIT: CPT | Performed by: ORTHOPAEDIC SURGERY

## 2025-04-14 PROCEDURE — 99211 OFF/OP EST MAY X REQ PHY/QHP: CPT | Performed by: ORTHOPAEDIC SURGERY

## 2025-04-14 NOTE — PROGRESS NOTES
History of present illness patient is 3 months out status post right shoulder large rotator cuff repair.  She has done extremely well.  She has been doing physical therapy but she is happy to report she is able to perform all her normal activities at home.      Physical exam:      General: No acute distress or breathing difficulty or discomfort, pleasant and cooperative with the examination.    Extremities: Right shoulder incisions are well-healed intact she has forward flexion up to 140 degrees abduction of 80 degrees external rotation of 50 degrees internal Tatian L4-5    Impression: Status post right shoulder large rotator cuff repair    Plan: Patient will now transition to home physical therapy program.  She is very active we just cautioned her on keeping overhead lifting to a minimum for at least 3 more months.  She will follow-up with us on as-needed basis for her right shoulder.

## 2025-04-16 ENCOUNTER — APPOINTMENT (OUTPATIENT)
Dept: PHYSICAL THERAPY | Facility: CLINIC | Age: 70
End: 2025-04-16
Payer: MEDICARE

## 2025-04-23 ENCOUNTER — APPOINTMENT (OUTPATIENT)
Dept: PHYSICAL THERAPY | Facility: CLINIC | Age: 70
End: 2025-04-23
Payer: MEDICARE

## 2025-04-30 ENCOUNTER — APPOINTMENT (OUTPATIENT)
Dept: PHYSICAL THERAPY | Facility: CLINIC | Age: 70
End: 2025-04-30
Payer: MEDICARE

## 2025-05-01 ENCOUNTER — TELEMEDICINE (OUTPATIENT)
Dept: PRIMARY CARE | Facility: CLINIC | Age: 70
End: 2025-05-01
Payer: MEDICARE

## 2025-05-01 DIAGNOSIS — I10 PRIMARY HYPERTENSION: ICD-10-CM

## 2025-05-01 DIAGNOSIS — J01.01 ACUTE RECURRENT MAXILLARY SINUSITIS: Primary | ICD-10-CM

## 2025-05-01 DIAGNOSIS — E78.5 DYSLIPIDEMIA: ICD-10-CM

## 2025-05-01 PROCEDURE — 99213 OFFICE O/P EST LOW 20 MIN: CPT | Performed by: FAMILY MEDICINE

## 2025-05-01 PROCEDURE — G2211 COMPLEX E/M VISIT ADD ON: HCPCS | Performed by: FAMILY MEDICINE

## 2025-05-01 RX ORDER — AMOXICILLIN AND CLAVULANATE POTASSIUM 875; 125 MG/1; MG/1
875 TABLET, FILM COATED ORAL 2 TIMES DAILY
Qty: 20 TABLET | Refills: 0 | Status: SHIPPED | OUTPATIENT
Start: 2025-05-01 | End: 2025-05-11
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
## 2025-05-05 NOTE — PATIENT INSTRUCTIONS
VISIT SUMMARY:  Today, you were seen for a sinus headache and sinus congestion that you have been experiencing for the past three to four days. The pain is primarily on the left side of your face and extends to the back of your head and neck. You also have yellow nasal discharge and tenderness under your left eye. You have been using Flonase nasal spray and nasal rinses, but excessive rinsing has caused nosebleeds. You mentioned that Augmentin has been effective for you in the past, and you are concerned about your upcoming trip to [place].    YOUR PLAN:  -ACUTE SINUSITIS: Acute sinusitis is an infection or inflammation of the sinuses, causing symptoms like headache, facial tenderness, and nasal discharge. You have been prescribed Augmentin, which has been effective for you in the past. Please monitor your symptoms and report if they do not improve a few days before your trip on May 15th.    INSTRUCTIONS:  Your prescription for Augmentin has been [facility] to the drug [facility] on [facility]. Please monitor your symptoms and let us know if they do not improve a few days before your trip on May 15th.

## 2025-05-07 ENCOUNTER — APPOINTMENT (OUTPATIENT)
Dept: PHYSICAL THERAPY | Facility: CLINIC | Age: 70
End: 2025-05-07
Payer: MEDICARE

## 2025-05-08 ENCOUNTER — CLINICAL SUPPORT (OUTPATIENT)
Dept: PRIMARY CARE | Facility: CLINIC | Age: 70
End: 2025-05-08
Payer: MEDICARE

## 2025-05-08 ENCOUNTER — TELEPHONE (OUTPATIENT)
Dept: PRIMARY CARE | Facility: CLINIC | Age: 70
End: 2025-05-08

## 2025-05-08 DIAGNOSIS — J01.01 ACUTE RECURRENT MAXILLARY SINUSITIS: ICD-10-CM

## 2025-05-08 PROCEDURE — 96372 THER/PROPH/DIAG INJ SC/IM: CPT | Performed by: FAMILY MEDICINE

## 2025-05-08 NOTE — TELEPHONE ENCOUNTER
Pt calling requesting a virtual today with LUIS ANTONIO for sinus issues, informed pt she can be seen by NP in walk-in clinic. Pt states per LUIS ANTONIO, she is able to call at any time to be placed on his schedule. Informed pt I needed to have an ok from clinical staff or LUIS ANTONIO and would call back with response.  Is pt able to have VV today per LUIS ANTONIO?

## 2025-05-08 NOTE — PROGRESS NOTES
Patient presents today for rocephin 1 gm injection. Given in right gluteal.     Tolerated well- no complications

## 2025-05-08 NOTE — TELEPHONE ENCOUNTER
Dr Batista pt    Pt phoned office and wants to know if she can get a rocephin shot while being on augmentin. Please advise    [phone]

## 2025-05-14 ENCOUNTER — APPOINTMENT (OUTPATIENT)
Dept: PHYSICAL THERAPY | Facility: CLINIC | Age: 70
End: 2025-05-14
Payer: MEDICARE

## 2025-07-03 ENCOUNTER — HOSPITAL ENCOUNTER (OUTPATIENT)
Dept: RADIOLOGY | Facility: CLINIC | Age: 70
Discharge: HOME | End: 2025-07-03
Payer: MEDICARE

## 2025-07-03 ENCOUNTER — OFFICE VISIT (OUTPATIENT)
Dept: ORTHOPEDIC SURGERY | Facility: CLINIC | Age: 70
End: 2025-07-03
Payer: MEDICARE

## 2025-07-03 DIAGNOSIS — M75.82 ROTATOR CUFF TENDINITIS, LEFT: Primary | ICD-10-CM

## 2025-07-03 DIAGNOSIS — M25.512 ACUTE PAIN OF LEFT SHOULDER: ICD-10-CM

## 2025-07-03 PROCEDURE — 73030 X-RAY EXAM OF SHOULDER: CPT | Mod: LT

## 2025-07-03 PROCEDURE — 2500000004 HC RX 250 GENERAL PHARMACY W/ HCPCS (ALT 636 FOR OP/ED): Performed by: ORTHOPAEDIC SURGERY

## 2025-07-03 PROCEDURE — 20610 DRAIN/INJ JOINT/BURSA W/O US: CPT | Mod: LT | Performed by: ORTHOPAEDIC SURGERY

## 2025-07-03 PROCEDURE — 99212 OFFICE O/P EST SF 10 MIN: CPT | Mod: 25 | Performed by: ORTHOPAEDIC SURGERY

## 2025-07-03 RX ORDER — LIDOCAINE HYDROCHLORIDE 10 MG/ML
5 INJECTION, SOLUTION INFILTRATION; PERINEURAL
Status: COMPLETED | OUTPATIENT
Start: 2025-07-03 | End: 2025-07-03

## 2025-07-03 RX ORDER — BETAMETHASONE SODIUM PHOSPHATE AND BETAMETHASONE ACETATE 3; 3 MG/ML; MG/ML
2 INJECTION, SUSPENSION INTRA-ARTICULAR; INTRALESIONAL; INTRAMUSCULAR; SOFT TISSUE
Status: COMPLETED | OUTPATIENT
Start: 2025-07-03 | End: 2025-07-03

## 2025-07-03 RX ADMIN — LIDOCAINE HYDROCHLORIDE 5 ML: 10 INJECTION, SOLUTION INFILTRATION; PERINEURAL at 13:31

## 2025-07-03 RX ADMIN — BETAMETHASONE ACETATE AND BETAMETHASONE SODIUM PHOSPHATE 2 ML: 3; 3 INJECTION, SUSPENSION INTRA-ARTICULAR; INTRALESIONAL; INTRAMUSCULAR; SOFT TISSUE at 13:31

## 2025-07-03 NOTE — PROGRESS NOTES
History of present illness: History left shoulder pain    Patient is recovering from right shoulder surgery in January doing very well but just due to overuse she aggravated the left shoulder    Physical exam:    General: No acute distress or breathing difficulty or discomfort, pleasant and cooperative with the examination.    Extremities: The left shoulder was inspected and was found to have no obvious deformity.  There was tenderness to touch over the lateral edges of the shoulder over the rotator cuff insertion.  Active forward flexion 120 degrees, external rotation to 60 degrees, abduction to 45 degrees, and internal rotation to the level of L2.    At this time the shoulder is neurovascular tact and neurosensory intact.  Motor intact C5-T1.  There was no obvious neck pain or radiculopathy noted.  There was no gross instability or adhesive capsulitis symptoms.  There was no evidence of apprehension or apprehension suppression.    Strength was tested in 4 planes with weakness in the supraspinatus strength testing and external rotation position.  There was no strength deficit in internal rotation.  Impingement signs were positive both supine and standing for impingement test type I and II.  There was mild pain over the bicipital groove with a positive speeds sign    Before aspiration injection the risks of a cortisone injection including infection, local skin irritation, skin atrophy, calcification, continued pain and discomfort, elevated blood sugar, burning, failure to relieve pain, possible late infection were discussed with the patient.    Postprocedure discomfort can be alleviated with additional medications, ice, elevation, rest over the first 24 hours as recommended.    Patient verbalized understanding and wanted to proceed with the planned procedure.    After informed consent was provided and allergies verified, the patient was positioned appropriately on thel bed.  The left shoulder to be aspirated and  injected was prepped and draped in a sterile fashion.  The skin was anesthetized with ethyl chloride spray.  A joint aspiration was to be performed an 18-gauge needle was used otherwise a 22-gauge needle was used to inject the appropriate joint.    Joint injection was performed with a mixture of 5 cc 1% lidocaine plain and 2 cc Celestone Soluspan 6 mg per mL.  The needle was removed and the puncture site closed and sealed with a Band-Aid.  The patient tolerated the procedure well.    Diagnostic studies: X-rays unremarkable 2 views left shoulder    Impression: Left shoulder cuff tendinitis due to overuse    Plan: Left shoulder injection    continue therapy rehab program of both shoulders    Will see her back 4 to 5 weeks if not improved left shoulder MRI would be in order      L Inj/Asp: L subacromial bursa on 7/3/2025 1:31 PM  Indications: pain  Details: 22 G needle, medial approach  Medications: 2 mL betamethasone acet,sod phos 6 mg/mL; 5 mL lidocaine 10 mg/mL (1 %)  Outcome: tolerated well, no immediate complications  Procedure, treatment alternatives, risks and benefits explained, specific risks discussed. Consent was given by the patient. Immediately prior to procedure a time out was called to verify the correct patient, procedure, equipment, support staff and site/side marked as required.

## 2025-07-25 DIAGNOSIS — J01.01 ACUTE RECURRENT MAXILLARY SINUSITIS: Primary | ICD-10-CM

## 2025-07-25 RX ORDER — AZITHROMYCIN 250 MG/1
TABLET, FILM COATED ORAL
Qty: 6 TABLET | Refills: 0 | Status: SHIPPED | OUTPATIENT
Start: 2025-07-25 | End: 2025-07-30

## 2025-07-28 ENCOUNTER — CLINICAL SUPPORT (OUTPATIENT)
Dept: PRIMARY CARE | Facility: CLINIC | Age: 70
End: 2025-07-28
Payer: MEDICARE

## 2025-07-28 DIAGNOSIS — T78.40XD ALLERGY, SUBSEQUENT ENCOUNTER: Primary | ICD-10-CM

## 2025-07-28 PROCEDURE — 96372 THER/PROPH/DIAG INJ SC/IM: CPT | Performed by: FAMILY MEDICINE

## 2025-07-28 RX ORDER — TRIAMCINOLONE ACETONIDE 40 MG/ML
80 INJECTION, SUSPENSION INTRA-ARTICULAR; INTRAMUSCULAR ONCE
Status: COMPLETED | OUTPATIENT
Start: 2025-07-28 | End: 2025-07-28

## 2025-07-28 RX ADMIN — TRIAMCINOLONE ACETONIDE 80 MG: 40 INJECTION, SUSPENSION INTRA-ARTICULAR; INTRAMUSCULAR at 14:39

## 2025-07-31 ENCOUNTER — APPOINTMENT (OUTPATIENT)
Dept: ORTHOPEDIC SURGERY | Facility: CLINIC | Age: 70
End: 2025-07-31
Payer: MEDICARE

## 2025-08-01 ENCOUNTER — APPOINTMENT (OUTPATIENT)
Dept: PRIMARY CARE | Facility: CLINIC | Age: 70
End: 2025-08-01
Payer: MEDICARE

## 2025-08-15 ENCOUNTER — OFFICE VISIT (OUTPATIENT)
Dept: PRIMARY CARE | Facility: CLINIC | Age: 70
End: 2025-08-15
Payer: MEDICARE

## 2025-08-15 VITALS
OXYGEN SATURATION: 96 % | HEIGHT: 64 IN | BODY MASS INDEX: 21.51 KG/M2 | SYSTOLIC BLOOD PRESSURE: 132 MMHG | HEART RATE: 60 BPM | RESPIRATION RATE: 16 BRPM | WEIGHT: 126 LBS | TEMPERATURE: 98.1 F | DIASTOLIC BLOOD PRESSURE: 80 MMHG

## 2025-08-15 DIAGNOSIS — S81.801A WOUND OF RIGHT LOWER EXTREMITY, INITIAL ENCOUNTER: Primary | ICD-10-CM

## 2025-08-15 PROCEDURE — 99213 OFFICE O/P EST LOW 20 MIN: CPT

## 2025-08-15 RX ORDER — MUPIROCIN 20 MG/G
OINTMENT TOPICAL 3 TIMES DAILY
Qty: 22 G | Refills: 0 | Status: SHIPPED | OUTPATIENT
Start: 2025-08-15 | End: 2025-08-25

## 2025-08-19 ENCOUNTER — OFFICE VISIT (OUTPATIENT)
Dept: WOUND CARE | Facility: CLINIC | Age: 70
End: 2025-08-19
Payer: MEDICARE

## 2025-08-19 ENCOUNTER — TELEPHONE (OUTPATIENT)
Dept: PRIMARY CARE | Facility: CLINIC | Age: 70
End: 2025-08-19
Payer: MEDICARE

## 2025-08-19 PROCEDURE — 99214 OFFICE O/P EST MOD 30 MIN: CPT | Mod: 25

## 2025-08-19 PROCEDURE — 11042 DBRDMT SUBQ TIS 1ST 20SQCM/<: CPT

## 2025-08-20 ENCOUNTER — OFFICE VISIT (OUTPATIENT)
Dept: PRIMARY CARE | Facility: CLINIC | Age: 70
End: 2025-08-20
Payer: MEDICARE

## 2025-08-20 DIAGNOSIS — E78.5 DYSLIPIDEMIA: ICD-10-CM

## 2025-08-20 DIAGNOSIS — S81.801A WOUND OF RIGHT LOWER EXTREMITY, INITIAL ENCOUNTER: Primary | ICD-10-CM

## 2025-08-20 DIAGNOSIS — J06.9 URI, ACUTE: ICD-10-CM

## 2025-08-20 DIAGNOSIS — I10 PRIMARY HYPERTENSION: ICD-10-CM

## 2025-08-20 RX ORDER — AZITHROMYCIN 250 MG/1
TABLET, FILM COATED ORAL
Qty: 6 TABLET | Refills: 0 | Status: SHIPPED | OUTPATIENT
Start: 2025-08-20 | End: 2025-08-25

## 2025-08-20 RX ORDER — ATENOLOL 50 MG/1
50 TABLET ORAL DAILY
Qty: 30 TABLET | Refills: 5 | Status: SHIPPED | OUTPATIENT
Start: 2025-08-20 | End: 2026-02-16

## 2025-08-20 RX ORDER — MUPIROCIN 20 MG/G
OINTMENT TOPICAL 3 TIMES DAILY
Qty: 30 G | Refills: 2 | Status: SHIPPED | OUTPATIENT
Start: 2025-08-20 | End: 2025-08-30

## 2025-08-20 ASSESSMENT — ENCOUNTER SYMPTOMS
OCCASIONAL FEELINGS OF UNSTEADINESS: 0
DEPRESSION: 0
LOSS OF SENSATION IN FEET: 0

## 2025-08-26 ENCOUNTER — OFFICE VISIT (OUTPATIENT)
Dept: WOUND CARE | Facility: CLINIC | Age: 70
End: 2025-08-26
Payer: MEDICARE

## 2025-08-26 PROCEDURE — 11043 DBRDMT MUSC&/FSCA 1ST 20/<: CPT | Performed by: SURGERY

## 2025-08-26 PROCEDURE — 11043 DBRDMT MUSC&/FSCA 1ST 20/<: CPT

## 2025-08-27 ENCOUNTER — OFFICE VISIT (OUTPATIENT)
Dept: PRIMARY CARE | Facility: CLINIC | Age: 70
End: 2025-08-27
Payer: MEDICARE

## 2025-08-27 DIAGNOSIS — I10 HYPERTENSION, UNSPECIFIED TYPE: ICD-10-CM

## 2025-08-27 DIAGNOSIS — R09.81 NASAL CONGESTION WITH RHINORRHEA: ICD-10-CM

## 2025-08-27 DIAGNOSIS — J01.40 ACUTE NON-RECURRENT PANSINUSITIS: Primary | ICD-10-CM

## 2025-08-27 DIAGNOSIS — J00 NASOPHARYNGITIS: ICD-10-CM

## 2025-08-27 DIAGNOSIS — J34.89 NASAL CONGESTION WITH RHINORRHEA: ICD-10-CM

## 2025-08-27 PROCEDURE — 99214 OFFICE O/P EST MOD 30 MIN: CPT | Performed by: NURSE PRACTITIONER

## 2025-08-27 RX ORDER — AMOXICILLIN 875 MG/1
875 TABLET, COATED ORAL 2 TIMES DAILY
Qty: 20 TABLET | Refills: 0 | Status: SHIPPED | OUTPATIENT
Start: 2025-08-27 | End: 2025-09-06

## 2025-08-27 RX ORDER — HYDROCHLOROTHIAZIDE 12.5 MG/1
12.5 TABLET ORAL DAILY
Qty: 30 TABLET | Refills: 0 | Status: SHIPPED | OUTPATIENT
Start: 2025-08-27 | End: 2025-08-29 | Stop reason: SINTOL

## 2025-08-28 VITALS
HEIGHT: 64 IN | BODY MASS INDEX: 21.41 KG/M2 | HEART RATE: 68 BPM | TEMPERATURE: 98.1 F | SYSTOLIC BLOOD PRESSURE: 140 MMHG | OXYGEN SATURATION: 97 % | WEIGHT: 125.4 LBS | RESPIRATION RATE: 16 BRPM | DIASTOLIC BLOOD PRESSURE: 90 MMHG

## 2025-08-28 ASSESSMENT — ENCOUNTER SYMPTOMS
OCCASIONAL FEELINGS OF UNSTEADINESS: 1
LOSS OF SENSATION IN FEET: 0
DEPRESSION: 0

## 2025-08-29 ENCOUNTER — DOCUMENTATION (OUTPATIENT)
Dept: PRIMARY CARE | Facility: CLINIC | Age: 70
End: 2025-08-29
Payer: MEDICARE

## 2025-08-29 DIAGNOSIS — I10 HYPERTENSION, UNSPECIFIED TYPE: Primary | ICD-10-CM

## 2025-08-29 RX ORDER — LOSARTAN POTASSIUM 25 MG/1
25 TABLET ORAL DAILY
Qty: 30 TABLET | Refills: 0 | Status: SHIPPED | OUTPATIENT
Start: 2025-08-29 | End: 2025-09-28

## 2025-09-02 ENCOUNTER — OFFICE VISIT (OUTPATIENT)
Dept: WOUND CARE | Facility: CLINIC | Age: 70
End: 2025-09-02
Payer: MEDICARE

## 2025-09-02 PROCEDURE — 99212 OFFICE O/P EST SF 10 MIN: CPT | Performed by: SURGERY

## 2025-09-02 PROCEDURE — 99212 OFFICE O/P EST SF 10 MIN: CPT

## 2025-09-11 ENCOUNTER — APPOINTMENT (OUTPATIENT)
Dept: PRIMARY CARE | Facility: CLINIC | Age: 70
End: 2025-09-11
Payer: MEDICARE

## 2025-11-28 ENCOUNTER — APPOINTMENT (OUTPATIENT)
Dept: PRIMARY CARE | Facility: CLINIC | Age: 70
End: 2025-11-28
Payer: MEDICARE

## (undated) DEVICE — K WIRE FIX L6IN DIA1.1MM ST S STL 3 SIDE DBL TRCR BOTH END
Type: IMPLANTABLE DEVICE | Site: FOOT | Status: NON-FUNCTIONAL
Removed: 2020-01-24

## (undated) DEVICE — STRIP,CLOSURE,WOUND,MEDI-STRIP,1/2X4: Brand: MEDLINE

## (undated) DEVICE — GAUZE,SPONGE,4"X4",12PLY,STERILE,LF,2'S: Brand: MEDLINE

## (undated) DEVICE — INTENDED FOR TISSUE SEPARATION, AND OTHER PROCEDURES THAT REQUIRE A SHARP SURGICAL BLADE TO PUNCTURE OR CUT.: Brand: BARD-PARKER ® CARBON RIB-BACK BLADES

## (undated) DEVICE — BAND, VASCULAR, RADIAL HEMOSTAT, REGULAR 24CM

## (undated) DEVICE — GLOVE SURG SZ 7 L12IN FNGR THK94MIL TRNSLUC YEL LTX HYDRGEL

## (undated) DEVICE — CHLORAPREP 26ML ORANGE

## (undated) DEVICE — STAPLE BNE FIX W11XL10MM NIT W/ INSTR DYNANITE

## (undated) DEVICE — CATHETER, OPTITORQUE, 5FR, JACKY, 3.5/ 2H/110CM, CURVED

## (undated) DEVICE — ZIMMER® A.T.S. CYCLINDRICAL TOURNIQUET CUFF, DUAL PORT, DUAL BLADDER 12 IN. (30 CM)

## (undated) DEVICE — X-RAY DETECTABLE SPONGES,16 PLY: Brand: VISTEC

## (undated) DEVICE — TUBING, MANIFOLD, LOW PRESSURE

## (undated) DEVICE — BUR SURG 4X8MM STD CARB OVL

## (undated) DEVICE — KIT SURG OINT ST WET UNIV PREP BDINE

## (undated) DEVICE — ELASTIC BANDAGE: Brand: DEROYAL

## (undated) DEVICE — BANDAGE,GAUZE,BULKEE II,4.5"X4.1YD,STRL: Brand: MEDLINE

## (undated) DEVICE — COVER LT HNDL BLU PLAS

## (undated) DEVICE — SUTURE ETHLN SZ 4-0 L18IN NONABSORBABLE BLK L19MM PS-2 3/8 1667H

## (undated) DEVICE — 4-PORT MANIFOLD: Brand: NEPTUNE 2

## (undated) DEVICE — BLADE SAW W9XL25MM THK0.38MM CUT THK0.43MM REPL SAG OSC THN

## (undated) DEVICE — 2000CC GUARDIAN II: Brand: GUARDIAN

## (undated) DEVICE — SUTURE VCRL SZ 4-0 L27IN ABSRB UD L19MM FS-2 3/8 CIR REV J422H

## (undated) DEVICE — 3M™ STERI-STRIP™ REINFORCED ADHESIVE SKIN CLOSURES, R1541, 1/4 IN X 3 IN (6 MM X 75 MM), 3 STRIPS/ENVELOPE: Brand: 3M™ STERI-STRIP™

## (undated) DEVICE — DRAPE SURG EXT FEN REINF ST W O FLD PCH STD

## (undated) DEVICE — SUTURE VCRL SZ 3-0 L27IN ABSRB UD L26MM SH 1/2 CIR J416H

## (undated) DEVICE — Device

## (undated) DEVICE — SHEATH, GLIDESHEATH, SLENDER, 6FR 10CM

## (undated) DEVICE — SUTURE MCRYL SZ 4-0 L27IN ABSRB UD L19MM PS-2 1/2 CIR PRIM Y426H